# Patient Record
Sex: FEMALE | Race: WHITE | Employment: OTHER | URBAN - METROPOLITAN AREA
[De-identification: names, ages, dates, MRNs, and addresses within clinical notes are randomized per-mention and may not be internally consistent; named-entity substitution may affect disease eponyms.]

---

## 2017-01-10 RX ORDER — METOPROLOL TARTRATE 100 MG/1
TABLET ORAL
Qty: 180 TAB | Refills: 3 | Status: SHIPPED | OUTPATIENT
Start: 2017-01-10 | End: 2018-02-08 | Stop reason: SDUPTHER

## 2017-02-21 ENCOUNTER — OFFICE VISIT (OUTPATIENT)
Dept: CARDIOLOGY CLINIC | Age: 72
End: 2017-02-21

## 2017-02-21 VITALS
OXYGEN SATURATION: 96 % | DIASTOLIC BLOOD PRESSURE: 70 MMHG | SYSTOLIC BLOOD PRESSURE: 110 MMHG | HEART RATE: 60 BPM | HEIGHT: 65 IN | WEIGHT: 215 LBS | BODY MASS INDEX: 35.82 KG/M2

## 2017-02-21 DIAGNOSIS — I42.9 CARDIOMYOPATHY, IDIOPATHIC (HCC): Primary | ICD-10-CM

## 2017-02-21 DIAGNOSIS — Z95.810 BIVENTRICULAR IMPLANTABLE CARDIOVERTER-DEFIBRILLATOR IN SITU: ICD-10-CM

## 2017-02-21 DIAGNOSIS — I44.7 LEFT BUNDLE BRANCH BLOCK: ICD-10-CM

## 2017-02-21 DIAGNOSIS — I48.0 PAF (PAROXYSMAL ATRIAL FIBRILLATION) (HCC): ICD-10-CM

## 2017-02-21 RX ORDER — ESCITALOPRAM OXALATE 20 MG/1
20 TABLET ORAL DAILY
COMMUNITY
Start: 2017-01-25

## 2017-02-21 RX ORDER — METFORMIN HYDROCHLORIDE 500 MG/1
500 TABLET, EXTENDED RELEASE ORAL 2 TIMES DAILY
COMMUNITY
Start: 2017-01-23 | End: 2018-07-31

## 2017-02-21 NOTE — PROGRESS NOTES
1. Have you been to the ER, urgent care clinic since your last visit? Hospitalized since your last visit? no  2. Have you seen or consulted any other health care providers outside of the [address] [place] [address] since your last visit? Include any pap smears or colon screening.  no

## 2017-02-21 NOTE — PROGRESS NOTES
HISTORY OF PRESENT ILLNESS  Bharati Jasmine is a 66 y.o. female. HPI  She has been doing well. She offers no cardiac complaints. She denies chest pain, dyspnea, orthopnea or PND. She denies any dyspnea on exertion at this time. She had an occasion where she walked for a long distance with no shortness of breath whatsoever. She denies palpitations, dizziness or syncope. She has had no symptoms to indicate TIA or amaurosis fugax. Her ICD has reached the end of battery life and she is in need of a generator change in a few months. Her echocardiogram on 07/07/2016 demonstrated normal left ventricular size and lower limit of normal left ventricular systolic function with EF in the 50% range. There was no significant valvular pathology. Left atrial dimension was normal with a volume index of 25 mL/meter2. There was no significant pulmonary hypertension. She has history of a left bundle branch block, hypertension, dyslipidemia and glucose intolerance. She has a 20 pack a year cigarette smoking history until 1988. She was told that she had a heart murmur and hole in her heart as a child, but this has not been clearly documented by echocardiogram or other diagnostic workup. She was seen by Dr. Tsering Thrasher in 2003, for the evaluation of left bundle branch block and had negative stress nuclear cardiac imaging. She was told that everything was okay by Dr. Tsering Thrasher at that time. She had repeat stress nuclear cardiac imaging on September 17, 2007, which demonstrated a moderate, fixed perfusion defect in the basal inferior and mid inferior wall, involving the inferior apex as well, with no significant ischemia. There was also moderately severe scarring in the anterior wall, anterior apex and anterior septum, with very mild associated ischemia.  There was an akinetic interventricular septum and anterior apex, and mild hypokinesis of the proximal anterior wall, with moderate hypokinesis of the entire inferior wall, with overall EF in the 25% range. She subsequently underwent cardiac catheterization on February 27, 2008 which demonstrated patent coronary arteries, but severe LV dysfunction with EF in the 25-30% range. She then went on to have biventricular ICD implantation on May 12, 2008. Her EF has improved to 56% by repeat echocardiogram in 2009. She had a repeat echocardiogram on August 13, 2010, which demonstrated once again improved LV function, with EF in the 55% range. Left atrial volume was normal at 23 ml/m². PA pressure was estimated normal as well.    Because of shortness of breath she had repeat echocardiogram on 4/16/2012 with demonstrated normal LV function with the EF in the 60 to 65% range. There was grade 1 diastolic dysfunction. PA pressure was estimated in the range of 25 to 30 mmHg. There was no significant valvular pathology.    She had an ultrasound examination of the aorta, which demonstrated possible aneurysm; however, the CT scan demonstrated no evidence of aneurysm whatsoever. She does not feel the palpitations much herself; however, her ICD interrogation demonstrated frequent episodes of atrial fibrillation, at times, very prolonged for hours. She had problems with worsening shortness of breath and she developed a cough and fever, and was seen in the emergency room on 05/30/16. She was ultimately diagnosed of DVT and pulmonary emboli by CT angiogram and has been placed on Eliquis. Review of Systems   Constitutional: Negative for malaise/fatigue and weight loss. HENT: Negative for hearing loss. Eyes: Negative for blurred vision and double vision. Respiratory: Negative for shortness of breath. Cardiovascular: Negative for chest pain, palpitations, orthopnea, claudication, leg swelling and PND. Gastrointestinal: Negative for blood in stool, heartburn and melena. Genitourinary: Negative for dysuria, frequency, hematuria and urgency.    Musculoskeletal: Negative for back pain and joint pain. Skin: Negative for itching and rash. Neurological: Negative for dizziness, loss of consciousness, weakness and headaches. Psychiatric/Behavioral: Negative for depression and memory loss. Physical Exam   Constitutional: She is oriented to person, place, and time. She appears well-developed and well-nourished. HENT:   Head: Normocephalic and atraumatic. Eyes: Conjunctivae are normal. Pupils are equal, round, and reactive to light. Neck: Normal range of motion. Neck supple. No JVD present. Cardiovascular: Normal rate, regular rhythm, S1 normal and S2 normal.   No extrasystoles are present. PMI is not displaced. Exam reveals no gallop and no friction rub. Murmur heard. Harsh early systolic murmur is present with a grade of 1/6  at the upper right sternal border  Pulses:       Carotid pulses are 3+ on the right side, and 3+ on the left side. Pulmonary/Chest: Effort normal. She has no rales. Abdominal: Soft. There is no tenderness. Musculoskeletal: She exhibits no edema. Neurological: She is alert and oriented to person, place, and time. No cranial nerve deficit. Skin: Skin is warm and dry. Psychiatric: She has a normal mood and affect. Her behavior is normal.     Visit Vitals    /70    Pulse 60    Ht 5' 5\" (1.651 m)    Wt 97.5 kg (215 lb)    SpO2 96%    BMI 35.78 kg/m2       Past Medical History   Diagnosis Date    CAD (coronary artery disease)      cardiomyopathy,CHF,,Cardiac Cath, pacemaker/defib.  Cancer [facility]) 2013     breast    Cardiac catheterization 02/27/2008     Patent coronary arteries. LVEDP 13 mmHg. EF 25-30%. Global hypk.  Cardiac echocardiogram 07/07/2016     EF 50% (prev 60-65% on study of 4/16/12). No WMA. Gr 1 DDfx. Normal RVSP.  Cardiac nuclear imaging test 05/31/2013     No evidence of ongoing ischemia or prior infarction. EF 60%. No RWMA.   Mild dyssynchrony of inferior base & mid/distal septum likely c/w pacemaker. Nondiagnostic EKG on pharm stress test due to V-pacing.  Cardiovascular abdominal aorta duplex 09/23/2015     Fusiform AAA in prox & mid portions of abdom Ao.  COPD (chronic obstructive pulmonary disease) (HCC) chronic bronchitis    Coronary artery disease Feb. 2008     Possible CAD with nonischemic dilated cardiomyopathy/EF 25%/ improved EF up to 56% by echocardiogram.    GERD (gastroesophageal reflux disease)     Heart failure (HCC)     Heart murmur     Hemangioma of liver     Hx of cardiomyopathy ([address]     Hypercholesterolemia     Hypertension     Left bundle branch block     Phlebitis     PVD (peripheral vascular disease) ([address]     Renal calculi 1999    Thromboembolus ([address]      below knee in R leg       Social History     Social History    Marital status:      Spouse name: N/A    Number of children: N/A    Years of education: N/A     Occupational History    Not on file. Social History Main Topics    Smoking status: Former Smoker     Packs/day: 1.50     Years: 20.00     Quit date: 2/23/1988    Smokeless tobacco: Never Used    Alcohol use No    Drug use: No    Sexual activity: Not on file     Other Topics Concern    Not on file     Social History Narrative       Family History   Problem Relation Age of Onset    Cancer Mother      Cancer of the breast    Stroke Mother     Cancer Father     Heart Disease Father      CHF    COPD Brother     Heart Attack Brother      Multiple    Heart Disease Brother      CHF       Past Surgical History   Procedure Laterality Date    Hx heart catheterization  02/27/08     Patent coronary arteries, but severe LV dysfunction w/ EF in the 25-30% range.     Hx breast lumpectomy Left 8/2013     cancerous lesion    Hx pelvic laparoscopy       diagnostic - varicosities    Hx knee arthroscopy Right     Hx pacemaker  May 2008     Biventricular ICD implantation     Hx pacemaker  2012     replacement - Medtronic    Hx cataract removal Bilateral 1980s     w/ lens implants    Hx cataract removal Left      re vised due to implant repositioning    Hx mastectomy Left 9/24/2014     LEFT PARTIAL MASTECTOMY WITH NEEDLE LOCALIZATION MAMMOGRAM TIMES TWO performed by Sekou Magaña MD at [facility] MAIN OR       Current Outpatient Prescriptions   Medication Sig Dispense Refill    escitalopram oxalate (LEXAPRO) 20 mg tablet Take 20 mg by mouth daily.  metFORMIN ER (GLUCOPHAGE XR) 500 mg tablet Take 500 mg by mouth daily (with dinner).  metoprolol tartrate (LOPRESSOR) 100 mg IR tablet TAKE ONE TABLET BY MOUTH TWICE DAILY 180 Tab 3    ELIQUIS 5 mg tablet two (2) times a day.  triamcinolone acetonide (KENALOG) 0.1 % topical cream       exemestane (AROMASIN) 25 mg tablet Take 25 mg by mouth daily.  pravastatin (PRAVACHOL) 80 mg tablet Take 80 mg by mouth nightly.  coenzyme q10-vitamin e (COQ10 ) 100-100 mg-unit cap Take 1 Tab by mouth daily. EKG: unchanged from previous tracings, 1:1 Biv.pacing  . ASSESSMENT and PLAN  Encounter Diagnoses   Name Primary?  Cardiomyopathy, idiopathic, EF now up to 55% Yes    PAF (paroxysmal atrial fibrillation) (HCC)     Left bundle branch block     Biventricular implantable cardioverter-defibrillator in situ    She has been doing well. She has had no signs or symptoms of decompensated congestive heart failure and her LV function has been maintained quite well with EF in the 50-55% range. She has had paroxysmal atrial fibrillation mostly in sinus rhythm, but because of her history of DVT and pulmonary emboli, she has been anticoagulated with Eliquis. That will be continued indefinitely particularly because of the paroxysmal atrial fibrillation. Her ICD has reached the end of battery life of the generator and she will be scheduled for a generator change.

## 2017-02-21 NOTE — PATIENT INSTRUCTIONS
DR. BRENNAN'S \[facility] [place] [facility]\""          Patient  EP Instructions                  1. You are scheduled to have a BIV AICD Gen change on  March 13 ,2017 , at 0915 am.    Please check in at March 13 , 2017.    2. Please go to [facility] and leisa in the outpatient parking lot that is located around to the back of the hospital and enter through the [employer]. Once you enter through the [facility] check in with the  there. The  will either give you directions or assist you in getting to the cath holding area. 3.  [x]       You are not to eat or drink anything after midnight the morning of the               procedure. 4. Please continue to take your medications with a small sip of water on the morning of the procedure with the following exceptions:  Hold Eliquis (March 11,2017) ( 48 hours prior to procedure)      5. If you are diabetic, do not take your insulin/sugar pill the morning of the procedure. 6. We encourage families to wait in the waiting room on the first floor while the procedure is being done. The Doctor will come out and talk with you as soon as the procedure is over. 7. There is the possibility that you may spend the night in the hospital, depending on the results of the procedure. This will be determined after the procedure is done. 8.   If you or your family have any questions, please call our office Monday-Friday 9:00am         -4:30 pm , at [phone], and ask to speak to one of the nurses.

## 2017-03-06 ENCOUNTER — HOSPITAL ENCOUNTER (OUTPATIENT)
Dept: LAB | Age: 72
Discharge: HOME | End: 2017-03-06
Payer: MEDICARE

## 2017-03-06 LAB
ALBUMIN SERPL BCP-MCNC: 4.2 G/DL (ref 3.4–5)
ALBUMIN/GLOB SERPL: 1.4 {RATIO} (ref 0.8–1.7)
ALP SERPL-CCNC: 128 U/L (ref 45–117)
ALT SERPL-CCNC: 20 U/L (ref 13–56)
ANION GAP BLD CALC-SCNC: 7 MMOL/L (ref 3–18)
AST SERPL W P-5'-P-CCNC: 16 U/L (ref 15–37)
BASOPHILS # BLD AUTO: 0.1 K/UL (ref 0–0.06)
BASOPHILS # BLD: 1 % (ref 0–2)
BILIRUB SERPL-MCNC: 0.9 MG/DL (ref 0.2–1)
BUN SERPL-MCNC: 12 MG/DL (ref 7–18)
BUN/CREAT SERPL: 10 (ref 12–20)
CALCIUM SERPL-MCNC: 10 MG/DL (ref 8.5–10.1)
CHLORIDE SERPL-SCNC: 104 MMOL/L (ref 100–108)
CO2 SERPL-SCNC: 30 MMOL/L (ref 21–32)
CREAT SERPL-MCNC: 1.22 MG/DL (ref 0.6–1.3)
DIFFERENTIAL METHOD BLD: ABNORMAL
EOSINOPHIL # BLD: 0.2 K/UL (ref 0–0.4)
EOSINOPHIL NFR BLD: 2 % (ref 0–5)
ERYTHROCYTE [DISTWIDTH] IN BLOOD BY AUTOMATED COUNT: 14.4 % (ref 11.6–14.5)
GLOBULIN SER CALC-MCNC: 3.1 G/DL (ref 2–4)
GLUCOSE SERPL-MCNC: 97 MG/DL (ref 74–99)
HCT VFR BLD AUTO: 46.7 % (ref 35–45)
HGB BLD-MCNC: 15.2 G/DL (ref 12–16)
INR PPP: 1.1 (ref 0.8–1.2)
LYMPHOCYTES # BLD AUTO: 28 % (ref 21–52)
LYMPHOCYTES # BLD: 2.2 K/UL (ref 0.9–3.6)
MCH RBC QN AUTO: 29.5 PG (ref 24–34)
MCHC RBC AUTO-ENTMCNC: 32.5 G/DL (ref 31–37)
MCV RBC AUTO: 90.7 FL (ref 74–97)
MONOCYTES # BLD: 0.7 K/UL (ref 0.05–1.2)
MONOCYTES NFR BLD AUTO: 8 % (ref 3–10)
NEUTS SEG # BLD: 4.9 K/UL (ref 1.8–8)
NEUTS SEG NFR BLD AUTO: 61 % (ref 40–73)
PLATELET # BLD AUTO: 263 K/UL (ref 135–420)
PMV BLD AUTO: 10.8 FL (ref 9.2–11.8)
POTASSIUM SERPL-SCNC: 4.6 MMOL/L (ref 3.5–5.5)
PROT SERPL-MCNC: 7.3 G/DL (ref 6.4–8.2)
PROTHROMBIN TIME: 13.8 SEC (ref 11.5–15.2)
RBC # BLD AUTO: 5.15 M/UL (ref 4.2–5.3)
SODIUM SERPL-SCNC: 141 MMOL/L (ref 136–145)
WBC # BLD AUTO: 8 K/UL (ref 4.6–13.2)

## 2017-03-06 PROCEDURE — 85610 PROTHROMBIN TIME: CPT | Performed by: INTERNAL MEDICINE

## 2017-03-06 PROCEDURE — 36415 COLL VENOUS BLD VENIPUNCTURE: CPT | Performed by: INTERNAL MEDICINE

## 2017-03-06 PROCEDURE — 80053 COMPREHEN METABOLIC PANEL: CPT | Performed by: INTERNAL MEDICINE

## 2017-03-06 PROCEDURE — 85025 COMPLETE CBC W/AUTO DIFF WBC: CPT | Performed by: INTERNAL MEDICINE

## 2017-03-08 NOTE — H&P
Please see full H&P. Plan biventricular aicd generator changeout, at [facility]. Eliquis held since Friday. Recent scope reviewed with patient. I confirmed patient ok with Medtronic device today. All risks, benefits, alternatives discussed. Plan moderate sedation if anesthesia not available. Risks included but not limited to pain, infection, bleeding, deep venous thrombosis with chronic swelling of arm, anesthesia reactions, pneumothorax, hemothorax, emergent open heart surgery, and death. All questions answered. Diagnosis:  Primary cardiologist Dr. Melani Mart at [facility]. Placed 2008, changeout 2012  -h/o transient NICM with EF 25-30% in 2008, 50% by echo July 2016. Cath 2008 without critical disease.  -Chronic class II diastolic heart failure, compensated  -h/o paroxysmal atrial fibrillation on Eliquis  -h/o DVT May 2016 on Eliquis  -Diabetes  -Recent melena, s/p EGD/colonscopy 3/7/17  -Life expectancy > 1 year  -Baseline LBBB with QRS > 120 msec  -Chronic BB therapy  -Unable to tolerate ARB/ACEI due to hypotension    HISTORY OF PRESENT ILLNESS  Roberto Allred is a 66 y.o. female.     HPI  She has been doing well. She offers no cardiac complaints. She denies chest pain, dyspnea, orthopnea or PND. She denies any dyspnea on exertion at this time. She had an occasion where she walked for a long distance with no shortness of breath whatsoever. She denies palpitations, dizziness or syncope. She has had no symptoms to indicate TIA or amaurosis fugax. Her ICD has reached the end of battery life and she is in need of a generator change in a few months. Her echocardiogram on 07/07/2016 demonstrated normal left ventricular size and lower limit of normal left ventricular systolic function with EF in the 50% range. There was no significant valvular pathology. Left atrial dimension was normal with a volume index of 25 mL/meter2.  There was no significant pulmonary hypertension.       She has history of a left bundle branch block, hypertension, dyslipidemia and glucose intolerance. She has a 20 pack a year cigarette smoking history until 1988. She was told that she had a heart murmur and hole in her heart as a child, but this has not been clearly documented by echocardiogram or other diagnostic workup. She was seen by Dr. Felicity Delgado in 2003, for the evaluation of left bundle branch block and had negative stress nuclear cardiac imaging. She was told that everything was okay by Dr. Felicity Delgado at that time. She had repeat stress nuclear cardiac imaging on September 17, 2007, which demonstrated a moderate, fixed perfusion defect in the basal inferior and mid inferior wall, involving the inferior apex as well, with no significant ischemia. There was also moderately severe scarring in the anterior wall, anterior apex and anterior septum, with very mild associated ischemia. There was an akinetic interventricular septum and anterior apex, and mild hypokinesis of the proximal anterior wall, with moderate hypokinesis of the entire inferior wall, with overall EF in the 25% range. She subsequently underwent cardiac catheterization on February 27, 2008 which demonstrated patent coronary arteries, but severe LV dysfunction with EF in the 25-30% range. She then went on to have biventricular ICD implantation on May 12, 2008. Her EF has improved to 56% by repeat echocardiogram in 2009. She had a repeat echocardiogram on August 13, 2010, which demonstrated once again improved LV function, with EF in the 55% range. Left atrial volume was normal at 23 ml/m². PA pressure was estimated normal as well.    Because of shortness of breath she had repeat echocardiogram on 4/16/2012 with demonstrated normal LV function with the EF in the 60 to 65% range. There was grade 1 diastolic dysfunction. PA pressure was estimated in the range of 25 to 30 mmHg.  There was no significant valvular pathology.    She had an ultrasound examination of the aorta, which demonstrated possible aneurysm; however, the CT scan demonstrated no evidence of aneurysm whatsoever. She does not feel the palpitations much herself; however, her ICD interrogation demonstrated frequent episodes of atrial fibrillation, at times, very prolonged for hours. She had problems with worsening shortness of breath and she developed a cough and fever, and was seen in the emergency room on 05/30/16. She was ultimately diagnosed of DVT and pulmonary emboli by CT angiogram and has been placed on Eliquis.     Review of Systems   Constitutional: Negative for malaise/fatigue and weight loss. HENT: Negative for hearing loss. Eyes: Negative for blurred vision and double vision. Respiratory: Negative for shortness of breath. Cardiovascular: Negative for chest pain, palpitations, orthopnea, claudication, leg swelling and PND. Gastrointestinal: Negative for blood in stool, heartburn and melena. Genitourinary: Negative for dysuria, frequency, hematuria and urgency. Musculoskeletal: Negative for back pain and joint pain. Skin: Negative for itching and rash. Neurological: Negative for dizziness, loss of consciousness, weakness and headaches. Psychiatric/Behavioral: Negative for depression and memory loss.         Physical Exam   Constitutional: She is oriented to person, place, and time. She appears well-developed and well-nourished. HENT:   Head: Normocephalic and atraumatic. Eyes: Conjunctivae are normal. Pupils are equal, round, and reactive to light. Neck: Normal range of motion. Neck supple. No JVD present. Cardiovascular: Normal rate, regular rhythm, S1 normal and S2 normal. No extrasystoles are present. PMI is not displaced. Exam reveals no gallop and no friction rub. Murmur heard. Harsh early systolic murmur is present with a grade of 1/6 at the upper right sternal border  Pulses:  Carotid pulses are 3+ on the right side, and 3+ on the left side.   Pulmonary/Chest: Effort normal. She has no rales. Abdominal: Soft. There is no tenderness. Musculoskeletal: She exhibits no edema. Neurological: She is alert and oriented to person, place, and time. No cranial nerve deficit. Skin: Skin is warm and dry. Psychiatric: She has a normal mood and affect. Her behavior is normal.           Visit Vitals    /70    Pulse 60    Ht 5' 5\" (1.651 m)    Wt 97.5 kg (215 lb)    SpO2 96%    BMI 35.78 kg/m2               Past Medical History   Diagnosis Date    CAD (coronary artery disease)         cardiomyopathy,CHF,,Cardiac Cath, pacemaker/defib.  Cancer [facility]) 2013       breast    Cardiac catheterization 02/27/2008       Patent coronary arteries. LVEDP 13 mmHg. EF 25-30%. Global hypk.  Cardiac echocardiogram 07/07/2016       EF 50% (prev 60-65% on study of 4/16/12). No WMA. Gr 1 DDfx. Normal RVSP.  Cardiac nuclear imaging test 05/31/2013       No evidence of ongoing ischemia or prior infarction. EF 60%. No RWMA. Mild dyssynchrony of inferior base & mid/distal septum likely c/w pacemaker. Nondiagnostic EKG on pharm stress test due to V-pacing.  Cardiovascular abdominal aorta duplex 09/23/2015       Fusiform AAA in prox & mid portions of abdom Ao.     [facility]ch[facility] obstructive pulmonary disease) ([facility]) chronic bronchitis    Coronary artery disease Feb. 2008       Possible CAD with nonischemic dilated cardiomyopathy/EF 25%/ improved EF up to 56% by echocardiogram.    GERD (gastroesophageal reflux disease)      Heart failure (HCC)      Heart murmur      Hemangioma of liver      Hx of cardiomyopathy (HCC)      Hypercholesterolemia      Hypertension      Left bundle branch block      Phlebitis      PVD (peripheral vascular disease) (HCC)      Renal calculi 1999    Thrombo[address]olus [address]         below knee in R leg         Social History            Social History    Marital status:        Spouse name: N/A    Number of children: N/A    Years of education: N/A     Occupational History    Not on file.            Social History Main Topics    Smoking status: Former Smoker       Packs/day: 1.50       Years: 20.00       Quit date: 2/23/1988    Smokeless tobacco: Never Used    Alcohol use No    Drug use: No    Sexual activity: Not on file           Other Topics Concern    Not on file      Social History Narrative                Family History   Problem Relation Age of Onset    Cancer Mother         Cancer of the breast    Stroke Mother      Cancer Father      Heart Disease Father         CHF    COPD Brother      Heart Attack Brother         Multiple    Heart Disease Brother         CHF                Past Surgical History   Procedure Laterality Date    Hx heart catheterization   02/27/08       Patent coronary arteries, but severe LV dysfunction w/ EF in the 25-30% range.  Hx breast lumpectomy Left 8/2013       cancerous lesion    Hx pelvic laparoscopy           diagnostic - varicosities    Hx knee arthroscopy Right      Hx pacemaker   May 2008       Biventricular ICD implantation     Hx pacemaker   2012       replacement - Medtronic    Hx cataract removal Bilateral 1980s       w/ lens implants    Hx cataract removal Left         re vised due to implant repositioning    Hx mastectomy Left 9/24/2014       LEFT PARTIAL MASTECTOMY WITH NEEDLE LOCALIZATION MAMMOGRAM TIMES TWO performed by Pam Wagoner MD at [address] [facility]                Current Outpatient Prescriptions   Medication Sig Dispense Refill    escitalopram oxalate (LEXAPRO) 20 mg tablet Take 20 mg by mouth daily.        metFORMIN ER (GLUCOPHAGE XR) 500 mg tablet Take 500 mg by mouth daily (with dinner).         metoprolol tartrate (LOPRESSOR) 100 mg IR tablet TAKE ONE TABLET BY MOUTH TWICE DAILY 180 Tab 3    ELIQUIS 5 mg tablet two (2) times a day.        triamcinolone acetonide (KENALOG) 0.1 % topical cream          exemestane (AROMASIN) 25 mg tablet Take 25 mg by mouth daily.        pravastatin (PRAVACHOL) 80 mg tablet Take 80 mg by mouth nightly.        coenzyme q10-vitamin e (COQ10 ) 100-100 mg-unit cap Take 1 Tab by mouth daily.             EKG: unchanged from previous tracings, 1:1 Biv.pacing  . ASSESSMENT and PLAN       Encounter Diagnoses   Name Primary?  Cardiomyopathy, idiopathic, EF now up to 55% Yes    PAF (paroxysmal atrial fibrillation) (HCC)      Left bundle branch block      Biventricular implantable cardioverter-defibrillator in situ       She has been doing well. She has had no signs or symptoms of decompensated congestive heart failure and her LV function has been maintained quite well with EF in the 50-55% range. She has had paroxysmal atrial fibrillation mostly in sinus rhythm, but because of her history of DVT and pulmonary emboli, she has been anticoagulated with Eliquis. That will be continued indefinitely particularly because of the paroxysmal atrial fibrillation.  Her ICD has reached the end of battery life of the generator and she will be scheduled for a generator change.

## 2017-03-11 ENCOUNTER — ANESTHESIA EVENT (OUTPATIENT)
Dept: CARDIAC CATH/INVASIVE PROCEDURES | Age: 72
End: 2017-03-11
Payer: MEDICARE

## 2017-03-13 ENCOUNTER — ANESTHESIA (OUTPATIENT)
Dept: CARDIAC CATH/INVASIVE PROCEDURES | Age: 72
End: 2017-03-13
Payer: MEDICARE

## 2017-03-13 ENCOUNTER — HOSPITAL ENCOUNTER (OUTPATIENT)
Dept: CARDIAC CATH/INVASIVE PROCEDURES | Age: 72
Discharge: HOME | End: 2017-03-13
Attending: INTERNAL MEDICINE | Admitting: INTERNAL MEDICINE
Payer: MEDICARE

## 2017-03-13 VITALS
WEIGHT: 215 LBS | RESPIRATION RATE: 23 BRPM | BODY MASS INDEX: 35.82 KG/M2 | SYSTOLIC BLOOD PRESSURE: 111 MMHG | TEMPERATURE: 97.9 F | HEIGHT: 65 IN | OXYGEN SATURATION: 98 % | DIASTOLIC BLOOD PRESSURE: 70 MMHG | HEART RATE: 58 BPM

## 2017-03-13 DIAGNOSIS — R59.0 MEDIASTINAL ADENOPATHY: ICD-10-CM

## 2017-03-13 DIAGNOSIS — I26.99 OTHER ACUTE PULMONARY EMBOLISM WITHOUT ACUTE COR PULMONALE (HCC): ICD-10-CM

## 2017-03-13 DIAGNOSIS — J44.9 CHRONIC OBSTRUCTIVE PULMONARY DISEASE, UNSPECIFIED COPD TYPE (HCC): ICD-10-CM

## 2017-03-13 PROBLEM — Z45.02 AICD AT END OF BATTERY LIFE: Status: ACTIVE | Noted: 2017-03-13

## 2017-03-13 PROCEDURE — 74011250636 HC RX REV CODE- 250/636: Performed by: INTERNAL MEDICINE

## 2017-03-13 PROCEDURE — 77030018673 EP STUDY

## 2017-03-13 PROCEDURE — 74011000250 HC RX REV CODE- 250: Performed by: INTERNAL MEDICINE

## 2017-03-13 PROCEDURE — 74011250636 HC RX REV CODE- 250/636

## 2017-03-13 PROCEDURE — 74011000250 HC RX REV CODE- 250

## 2017-03-13 PROCEDURE — 76060000033 HC ANESTHESIA 1 TO 1.5 HR

## 2017-03-13 RX ORDER — PROPOFOL 10 MG/ML
INJECTION, EMULSION INTRAVENOUS
Status: DISCONTINUED | OUTPATIENT
Start: 2017-03-13 | End: 2017-03-13 | Stop reason: HOSPADM

## 2017-03-13 RX ORDER — SODIUM CHLORIDE 0.9 % (FLUSH) 0.9 %
5-10 SYRINGE (ML) INJECTION AS NEEDED
Status: CANCELLED | OUTPATIENT
Start: 2017-03-13

## 2017-03-13 RX ORDER — MAGNESIUM SULFATE 100 %
4 CRYSTALS MISCELLANEOUS AS NEEDED
Status: CANCELLED | OUTPATIENT
Start: 2017-03-13

## 2017-03-13 RX ORDER — DEXTROSE 50 % IN WATER (D50W) INTRAVENOUS SYRINGE
25-50 AS NEEDED
Status: DISCONTINUED | OUTPATIENT
Start: 2017-03-13 | End: 2017-03-13 | Stop reason: HOSPADM

## 2017-03-13 RX ORDER — FLUMAZENIL 0.1 MG/ML
0.2 INJECTION INTRAVENOUS
Status: CANCELLED | OUTPATIENT
Start: 2017-03-13

## 2017-03-13 RX ORDER — MIDAZOLAM HYDROCHLORIDE 1 MG/ML
INJECTION, SOLUTION INTRAMUSCULAR; INTRAVENOUS AS NEEDED
Status: DISCONTINUED | OUTPATIENT
Start: 2017-03-13 | End: 2017-03-13 | Stop reason: HOSPADM

## 2017-03-13 RX ORDER — DEXTROSE 50 % IN WATER (D50W) INTRAVENOUS SYRINGE
25-50 AS NEEDED
Status: CANCELLED | OUTPATIENT
Start: 2017-03-13

## 2017-03-13 RX ORDER — CEFAZOLIN SODIUM 2 G/50ML
2 SOLUTION INTRAVENOUS ONCE
Status: COMPLETED | OUTPATIENT
Start: 2017-03-13 | End: 2017-03-13

## 2017-03-13 RX ORDER — FAMOTIDINE 20 MG/1
20 TABLET, FILM COATED ORAL ONCE
Status: DISCONTINUED | OUTPATIENT
Start: 2017-03-13 | End: 2017-03-13 | Stop reason: HOSPADM

## 2017-03-13 RX ORDER — MAGNESIUM SULFATE 100 %
4 CRYSTALS MISCELLANEOUS AS NEEDED
Status: DISCONTINUED | OUTPATIENT
Start: 2017-03-13 | End: 2017-03-13 | Stop reason: HOSPADM

## 2017-03-13 RX ORDER — LIDOCAINE HYDROCHLORIDE 20 MG/ML
INJECTION, SOLUTION EPIDURAL; INFILTRATION; INTRACAUDAL; PERINEURAL AS NEEDED
Status: DISCONTINUED | OUTPATIENT
Start: 2017-03-13 | End: 2017-03-13 | Stop reason: HOSPADM

## 2017-03-13 RX ORDER — IODIXANOL 320 MG/ML
1-150 INJECTION, SOLUTION INTRAVASCULAR
Status: DISCONTINUED | OUTPATIENT
Start: 2017-03-13 | End: 2017-03-13 | Stop reason: HOSPADM

## 2017-03-13 RX ORDER — APIXABAN 5 MG/1
5 TABLET, FILM COATED ORAL 2 TIMES DAILY
Qty: 120 TAB | Refills: 3 | Status: SHIPPED | OUTPATIENT
Start: 2017-03-13 | End: 2021-03-24 | Stop reason: SDUPTHER

## 2017-03-13 RX ORDER — LIDOCAINE HYDROCHLORIDE 10 MG/ML
1-60 INJECTION, SOLUTION EPIDURAL; INFILTRATION; INTRACAUDAL; PERINEURAL
Status: DISCONTINUED | OUTPATIENT
Start: 2017-03-13 | End: 2017-03-13 | Stop reason: HOSPADM

## 2017-03-13 RX ORDER — INSULIN LISPRO 100 [IU]/ML
INJECTION, SOLUTION INTRAVENOUS; SUBCUTANEOUS ONCE
Status: CANCELLED | OUTPATIENT
Start: 2017-03-13 | End: 2017-03-13

## 2017-03-13 RX ORDER — OXYCODONE AND ACETAMINOPHEN 5; 325 MG/1; MG/1
1 TABLET ORAL
Qty: 20 TAB | Refills: 0 | Status: SHIPPED | OUTPATIENT
Start: 2017-03-13 | End: 2017-08-21

## 2017-03-13 RX ORDER — HEPARIN SODIUM 200 [USP'U]/100ML
500 INJECTION, SOLUTION INTRAVENOUS ONCE
Status: DISCONTINUED | OUTPATIENT
Start: 2017-03-13 | End: 2017-03-13 | Stop reason: HOSPADM

## 2017-03-13 RX ORDER — PROPOFOL 10 MG/ML
INJECTION, EMULSION INTRAVENOUS AS NEEDED
Status: DISCONTINUED | OUTPATIENT
Start: 2017-03-13 | End: 2017-03-13 | Stop reason: HOSPADM

## 2017-03-13 RX ORDER — SODIUM CHLORIDE, SODIUM LACTATE, POTASSIUM CHLORIDE, CALCIUM CHLORIDE 600; 310; 30; 20 MG/100ML; MG/100ML; MG/100ML; MG/100ML
100 INJECTION, SOLUTION INTRAVENOUS CONTINUOUS
Status: CANCELLED | OUTPATIENT
Start: 2017-03-13

## 2017-03-13 RX ORDER — NALOXONE HYDROCHLORIDE 0.4 MG/ML
0.1 INJECTION, SOLUTION INTRAMUSCULAR; INTRAVENOUS; SUBCUTANEOUS
Status: CANCELLED | OUTPATIENT
Start: 2017-03-13

## 2017-03-13 RX ORDER — SODIUM CHLORIDE 9 MG/ML
INJECTION, SOLUTION INTRAVENOUS
Status: DISCONTINUED | OUTPATIENT
Start: 2017-03-13 | End: 2017-03-13 | Stop reason: HOSPADM

## 2017-03-13 RX ORDER — INSULIN LISPRO 100 [IU]/ML
INJECTION, SOLUTION INTRAVENOUS; SUBCUTANEOUS ONCE
Status: DISCONTINUED | OUTPATIENT
Start: 2017-03-13 | End: 2017-03-13 | Stop reason: HOSPADM

## 2017-03-13 RX ORDER — SODIUM CHLORIDE, SODIUM LACTATE, POTASSIUM CHLORIDE, CALCIUM CHLORIDE 600; 310; 30; 20 MG/100ML; MG/100ML; MG/100ML; MG/100ML
25 INJECTION, SOLUTION INTRAVENOUS CONTINUOUS
Status: DISCONTINUED | OUTPATIENT
Start: 2017-03-13 | End: 2017-03-13 | Stop reason: HOSPADM

## 2017-03-13 RX ORDER — FENTANYL CITRATE 50 UG/ML
INJECTION, SOLUTION INTRAMUSCULAR; INTRAVENOUS AS NEEDED
Status: DISCONTINUED | OUTPATIENT
Start: 2017-03-13 | End: 2017-03-13 | Stop reason: HOSPADM

## 2017-03-13 RX ORDER — CEFAZOLIN SODIUM 1 G/3ML
1 INJECTION, POWDER, FOR SOLUTION INTRAMUSCULAR; INTRAVENOUS ONCE
Status: COMPLETED | OUTPATIENT
Start: 2017-03-13 | End: 2017-03-13

## 2017-03-13 RX ADMIN — FENTANYL CITRATE 25 MCG: 50 INJECTION, SOLUTION INTRAMUSCULAR; INTRAVENOUS at 09:35

## 2017-03-13 RX ADMIN — PROPOFOL 30 MG: 10 INJECTION, EMULSION INTRAVENOUS at 09:43

## 2017-03-13 RX ADMIN — LIDOCAINE HYDROCHLORIDE 20 MG: 20 INJECTION, SOLUTION EPIDURAL; INFILTRATION; INTRACAUDAL; PERINEURAL at 09:24

## 2017-03-13 RX ADMIN — FENTANYL CITRATE 50 MCG: 50 INJECTION, SOLUTION INTRAMUSCULAR; INTRAVENOUS at 09:26

## 2017-03-13 RX ADMIN — PROPOFOL 25 MCG/KG/MIN: 10 INJECTION, EMULSION INTRAVENOUS at 09:26

## 2017-03-13 RX ADMIN — FENTANYL CITRATE 25 MCG: 50 INJECTION, SOLUTION INTRAMUSCULAR; INTRAVENOUS at 09:58

## 2017-03-13 RX ADMIN — SODIUM CHLORIDE: 9 INJECTION, SOLUTION INTRAVENOUS at 09:14

## 2017-03-13 RX ADMIN — CEFAZOLIN SODIUM 2 G: 2 SOLUTION INTRAVENOUS at 09:21

## 2017-03-13 RX ADMIN — LIDOCAINE HYDROCHLORIDE 30 ML: 10 INJECTION, SOLUTION EPIDURAL; INFILTRATION; INTRACAUDAL; PERINEURAL at 09:59

## 2017-03-13 RX ADMIN — CEFAZOLIN 1 G: 330 INJECTION, POWDER, FOR SOLUTION INTRAMUSCULAR; INTRAVENOUS at 09:41

## 2017-03-13 RX ADMIN — MIDAZOLAM HYDROCHLORIDE 1 MG: 1 INJECTION, SOLUTION INTRAMUSCULAR; INTRAVENOUS at 09:35

## 2017-03-13 RX ADMIN — MIDAZOLAM HYDROCHLORIDE 1 MG: 1 INJECTION, SOLUTION INTRAMUSCULAR; INTRAVENOUS at 09:26

## 2017-03-13 NOTE — PROGRESS NOTES
Pt taken to car in front of lobby area via wc by this nurse, tolerated well; pt alert oriented; given medtronic box, card, pamphlet in pt belonging bag; rx x 1 for percocet and dc paperwork

## 2017-03-13 NOTE — ANESTHESIA POSTPROCEDURE EVALUATION
Post-Anesthesia Evaluation and Assessment    Patient: Marylu Monahan MRN: [id number]  SSN: [id number]    YOB: 1945  Age: 66 y.o. Sex: female       Cardiovascular Function/Vital Signs  Visit Vitals    /74 (BP 1 Location: Left arm, BP Patient Position: At rest;Supine)    Pulse (!) 58    Temp 36.6 °C (97.9 °F)    Resp 18    Ht 5' 5\" (1.651 m)    Wt 97.5 kg (215 lb)    SpO2 96%    Breastfeeding No    BMI 35.78 kg/m2       Patient is status post MAC anesthesia for * No procedures listed *. Nausea/Vomiting: None    Postoperative hydration reviewed and adequate. Pain:  Pain Scale 1: Numeric (0 - 10) (03/13/17 1027)  Pain Intensity 1: 0 (03/13/17 1027)   Managed    Neurological Status: At baseline    Mental Status and Level of Consciousness: Arousable    Pulmonary Status:   O2 Device: Room air (03/13/17 1027)   Adequate oxygenation and airway patent    Complications related to anesthesia: None    Post-anesthesia assessment completed.  No concerns    Signed By: Linn Hinojosa MD     March 13, 2017

## 2017-03-13 NOTE — DISCHARGE INSTRUCTIONS
Resume Eliquis in 72 hours from procedure. Disposition:  Will need follow-up with device/wound check in 7-10 days in my office. Please contact office at [phone] to confirm appointment. Main Office:    [address], [address]    Restrictions: For affected arm:  No lifting greater than 10 lbs or lifting elbow above shoulder for 4 weeks. Keep incision clean and dry for a total of 72 hours after procedure. Remove dressing in 24 hours if not already removed. Please remove the steristrips (small white adhesive strips over wound) after 7 days if they have not already fallen off. No hot tubs or pools for 2 weeks. OK to shower with \"pad\" dry incision after 72 hours. No driving ideally for 1 week due to concern for airbag. DISCHARGE SUMMARY from Nurse    The following personal items are in your possession at time of discharge:       Visual Aid: Glasses, At home           Clothing: With patient                PATIENT INSTRUCTIONS:    After general anesthesia or intravenous sedation, for 24 hours or while taking prescription Narcotics:  · Limit your activities  · Do not drive and operate hazardous machinery  · Do not make important personal or business decisions  · Do  not drink alcoholic beverages  · If you have not urinated within 8 hours after discharge, please contact your surgeon on call. Report the following to your surgeon:  · Excessive pain, swelling, redness or odor of or around the surgical area  · Temperature over 100.5  · Nausea and vomiting lasting longer than 4 hours or if unable to take medications  · Any signs of decreased circulation or nerve impairment to extremity: change in color, persistent  numbness, tingling, coldness or increase pain  · Any questions        What to do at Home:    *  Please give a list of your current medications to your Primary Care Provider.     *  Please update this list whenever your medications are discontinued, doses are changed, or new medications (including over-the-counter products) are added. *  Please carry medication information at all times in case of emergency situations. These are general instructions for a healthy lifestyle:    No smoking/ No tobacco products/ Avoid exposure to second hand smoke    Surgeon General's Warning:  Quitting smoking now greatly reduces serious risk to your health. Obesity, smoking, and sedentary lifestyle greatly increases your risk for illness    A healthy diet, regular physical exercise & weight monitoring are important for maintaining a healthy lifestyle    You may be retaining fluid if you have a history of heart failure or if you experience any of the following symptoms:  Weight gain of 3 pounds or more overnight or 5 pounds in a week, increased swelling in our hands or feet or shortness of breath while lying flat in bed. Please call your doctor as soon as you notice any of these symptoms; do not wait until your next office visit. Recognize signs and symptoms of STROKE:    F-face looks uneven    A-arms unable to move or move unevenly    S-speech slurred or non-existent    T-time-call 911 as soon as signs and symptoms begin-DO NOT go       Back to bed or wait to see if you get better-TIME IS BRAIN. Warning Signs of HEART ATTACK     Call 911 if you have these symptoms:   Chest discomfort. Most heart attacks involve discomfort in the center of the chest that lasts more than a few minutes, or that goes away and comes back. It can feel like uncomfortable pressure, squeezing, fullness, or pain.  Discomfort in other areas of the upper body. Symptoms can include pain or discomfort in one or both arms, the back, neck, jaw, or stomach.  Shortness of breath with or without chest discomfort.  Other signs may include breaking out in a cold sweat, nausea, or lightheadedness. Don't wait more than five minutes to call 911 - MINUTES MATTER! Fast action can save your life. Calling 911 is almost always the fastest way to get lifesaving treatment. Emergency Medical Services staff can begin treatment when they arrive -- up to an hour sooner than if someone gets to the hospital by car. The discharge information has been reviewed with the patient and spouse. The patient and spouse verbalized understanding. Discharge medications reviewed with the patient and spouse and appropriate educational materials and side effects teaching were provided. Patient armband removed and shredded    MyChart Activation    Thank you for requesting access to myZamana. Please follow the instructions below to securely access and download your online medical record. myZamana allows you to send messages to your doctor, view your test results, renew your prescriptions, schedule appointments, and more. How Do I Sign Up? 1. In your internet browser, go to [email or web address]. 2. Click on the First Time User? Click Here link in the Sign In box. You will see the New Member Sign Up page. 3. Enter your myZamana Access Code exactly as it appears below. You will not need to use this code after youve completed the sign-up process. If you do not sign up before the expiration date, you must request a new code. myZamana Access Code: [email or web address]  Expires: 2017  3:27 PM (This is the date your myZamana access code will )    4. Enter the last four digits of your Social Security Number (xxxx) and Date of Birth (mm/dd/yyyy) as indicated and click Submit. You will be taken to the next sign-up page. 5. Create a myZamana ID. This will be your myZamana login ID and cannot be changed, so think of one that is secure and easy to remember. 6. Create a myZamana password. You can change your password at any time. 7. Enter your Password Reset Question and Answer. This can be used at a later time if you forget your password. 8. Enter your e-mail address.  You will receive e-mail notification when new information is available in [address] 9. Click Sign Up. You can now view and download portions of your medical record. 10. Click the Download Summary menu link to download a portable copy of your medical information. Additional Information    If you have questions, please visit the Frequently Asked Questions section of the [employer] website at [email or web address] Remember, Ther[employer] is NOT to be used for urgent needs. For medical emergencies, dial 911.

## 2017-03-13 NOTE — PROGRESS NOTES
TRANSFER - IN REPORT:    Verbal report received from Delbra Goldberg, RCIS(name) on Yefri Seat  being received from EP lab(unit) for routine post - op      Report consisted of patients Situation, Background, Assessment and   Recommendations(SBAR). Information from the following report(s) SBAR, Procedure Summary, Intake/Output and MAR was reviewed with the receiving nurse. Opportunity for questions and clarification was provided. Assessment completed upon patients arrival to unit and care assumed.

## 2017-03-13 NOTE — ROUTINE PROCESS
TRANSFER - OUT REPORT:    Verbal report given to Leann Norman RN(name) on Marianela Barney  being transferred to [facility](unit) for routine progression of care       Report consisted of patients Situation, Background, Assessment and   Recommendations(SBAR). Information from the following report(s) SBAR, Procedure Summary and MAR was reviewed with the receiving nurse. Lines:   Peripheral IV 03/07/17 Right Hand (Active)       Peripheral IV 03/13/17 Right Forearm (Active)   Site Assessment Clean, dry, & intact 3/13/2017  9:00 AM   Phlebitis Assessment 0 3/13/2017  9:00 AM   Infiltration Assessment 0 3/13/2017  9:00 AM   Dressing Status New 3/13/2017  9:00 AM   Dressing Type Transparent 3/13/2017  9:00 AM   Hub Color/Line Status Pink;Flushed;Patent 3/13/2017  9:00 AM   Alcohol Cap Used Yes 3/13/2017  9:00 AM        Opportunity for questions and clarification was provided.       Patient transported with:   [employer] Active

## 2017-03-13 NOTE — IP AVS SNAPSHOT
[facility] 
 
 
 [address] Patient: Serjio Bello MRN: [id number] :1945 You are allergic to the following Allergen Reactions Lisinopril Rash Hypertensive crisis Tetracycline Anaphylaxis Rash Swelling Hibiclens (Chlorhexidine Gluconate) Other (comments) Turned red and BP drop low. Other Medication Rash Swelling  
 -Myacins Recent Documentation Height Weight Breastfeeding? BMI OB Status Smoking Status 1.651 m 97.5 kg No 35.78 kg/m2 Postmenopausal Former Smoker Emergency Contacts Name Discharge Info Relation Home Work Mobile Mal Garay  Spouse [3] [phone] Silviano Miller  Son [22] [phone] About your hospitalization You were admitted on:  2017 You last received care in the:  [facility] You were discharged on:  2017 Unit phone number:  [phone] Why you were hospitalized Your primary diagnosis was:  Aicd At End Of Battery Life Providers Seen During Your Hospitalizations Provider Role Specialty Primary office phone Ramona Butt MD Attending Provider Cardiology [phone] Your Primary Care Physician (PCP) Primary Care Physician Office Phone Office Fax [phone],[facility], [address] [facility] [phone] Follow-up Information Follow up With Details Comments Contact Info Meredith Forde MD   [address] [place] [address] [phone] Alondra Shoemaker MD Schedule an appointment as soon as possible for a visit in 4 weeks follow up in 4-6 weeks [address] [place] [address] [address] 207 [facility] 
[phone] Ramona Butt MD Schedule an appointment as soon as possible for a visit in 1 week follow up in 7-10 days 27 [facility] [address] [facility] [address] [address] [address] [phone] Your Appointments Wednesday April 19, 2017 10:30 AM EDT Nurse Visit with [facility] [address] ([address]) 235 [facility], [address] [address]  
[phone] Wednesday April 19, 2017 11:00 AM EDT Nurse Visit with [facility] [address] ([address]) 235 [facility], [address] [address]  
[phone] Wednesday April 19, 2017 11:45 AM EDT Follow Up with Kaila Savage MD  
[address] ([address]) 235 [facility], [address] [address]  
[phone] Current Discharge Medication List  
  
START taking these medications Dose & Instructions Dispensing Information Comments Morning Noon Evening Bedtime  
 oxyCODONE-acetaminophen 5-325 mg per tablet Commonly known as:  PERCOCET Your last dose was: Your next dose is: Other:  _________ Dose:  1 Tab Take 1 Tab by mouth every six (6) hours as needed for Pain. Max Daily Amount: 4 Tabs. Quantity:  20 Tab Refills:  0 CONTINUE these medications which have CHANGED Dose & Instructions Dispensing Information Comments Morning Noon Evening Bedtime ELIQUIS 5 mg tablet Generic drug:  apixaban What changed:   
- how much to take 
- how to take this Your last dose was: Your next dose is: Other:  _________ Dose:  5 mg Take 1 Tab by mouth two (2) times a day. Quantity:  120 Tab Refills:  3 Resume evening of 3/16/17 CONTINUE these medications which have NOT CHANGED Dose & Instructions Dispensing Information Comments Morning Noon Evening Bedtime  
 escitalopram oxalate 20 mg tablet Commonly known as:  Cloria Kohut Your last dose was: Your next dose is: Other:  _________ Dose:  20 mg Take 20 mg by mouth daily. Refills:  0  
     
   
   
   
  
 exemestane 25 mg tablet Commonly known as:  Morene Chafe Your last dose was: Your next dose is: Other:  _________ Dose:  25 mg Take 25 mg by mouth daily. Refills:  0  
     
   
   
   
  
 metFORMIN  mg tablet Commonly known as:  GLUCOPHAGE XR Your last dose was: Your next dose is: Other:  _________ Dose:  500 mg Take 500 mg by mouth daily (with dinner). Refills:  0  
     
   
   
   
  
 metoprolol tartrate 100 mg IR tablet Commonly known as:  LOPRESSOR Your last dose was: Your next dose is: Other:  _________ TAKE ONE TABLET BY MOUTH TWICE DAILY Quantity:  180 Tab Refills:  3 PRAVACHOL 80 mg tablet Generic drug:  pravastatin Your last dose was: Your next dose is: Other:  _________ Dose:  80 mg Take 80 mg by mouth nightly. Refills:  0 Where to Get Your Medications These medications were sent to [facility], [address] [address] Phone:  [phone] ELIQUIS 5 mg tablet Information on where to get these meds will be given to you by the nurse or doctor. ! Ask your nurse or doctor about these medications  
  oxyCODONE-acetaminophen 5-325 mg per tablet Discharge Instructions Resume Eliquis in 72 hours from procedure. Disposition: 
Will need follow-up with device/wound check in 7-10 days in my office. Please contact office at [phone] to confirm appointment. Main Office:   
 [address], [address] [place]  Restrictions: For affected arm:  No lifting greater than 10 lbs or lifting elbow above shoulder for 4 weeks. Keep incision clean and dry for a total of 72 hours after procedure. Remove dressing in 24 hours if not already removed. Please remove the steristrips (small white adhesive strips over wound) after 7 days if they have not already fallen off. No hot tubs or pools for 2 weeks. OK to shower with \"pad\" dry incision after 72 hours. No driving ideally for 1 week due to concern for airbag. DISCHARGE SUMMARY from Nurse The following personal items are in your possession at time of discharge: 
 
  
Visual Aid: Glasses, At home Clothing: With patient PATIENT INSTRUCTIONS: 
 
After general anesthesia or intravenous sedation, for 24 hours or while taking prescription Narcotics: · Limit your activities · Do not drive and operate hazardous machinery · Do not make important personal or business decisions · Do  not drink alcoholic beverages · If you have not urinated within 8 hours after discharge, please contact your surgeon on call. Report the following to your surgeon: 
· Excessive pain, swelling, redness or odor of or around the surgical area · Temperature over 100.5 · Nausea and vomiting lasting longer than 4 hours or if unable to take medications · Any signs of decreased circulation or nerve impairment to extremity: change in color, persistent  numbness, tingling, coldness or increase pain · Any questions What to do at Home: *  Please give a list of your current medications to your Primary Care Provider. *  Please update this list whenever your medications are discontinued, doses are 
    changed, or new medications (including over-the-counter products) are added. *  Please carry medication information at all times in case of emergency situations. These are general instructions for a healthy lifestyle: No smoking/ No tobacco products/ Avoid exposure to second hand smoke Surgeon General's Warning:  Quitting smoking now greatly reduces serious risk to your health. Obesity, smoking, and sedentary lifestyle greatly increases your risk for illness A healthy diet, regular physical exercise & weight monitoring are important for maintaining a healthy lifestyle You may be retaining fluid if you have a history of heart failure or if you experience any of the following symptoms:  Weight gain of 3 pounds or more overnight or 5 pounds in a week, increased swelling in our hands or feet or shortness of breath while lying flat in bed. Please call your doctor as soon as you notice any of these symptoms; do not wait until your next office visit. Recognize signs and symptoms of STROKE: 
 
F-face looks uneven A-arms unable to move or move unevenly S-speech slurred or non-existent T-time-call 911 as soon as signs and symptoms begin-DO NOT go Back to bed or wait to see if you get better-TIME IS BRAIN. Warning Signs of HEART ATTACK Call 911 if you have these symptoms: 
? Chest discomfort. Most heart attacks involve discomfort in the center of the chest that lasts more than a few minutes, or that goes away and comes back. It can feel like uncomfortable pressure, squeezing, fullness, or pain. ? Discomfort in other areas of the upper body. Symptoms can include pain or discomfort in one or both arms, the back, neck, jaw, or stomach. ? Shortness of breath with or without chest discomfort. ? Other signs may include breaking out in a cold sweat, nausea, or lightheadedness. Don't wait more than five minutes to call [address]! Fast action can save your life. Calling 911 is almost always the fastest way to get lifesaving treatment. Emergency Medical Services staff can begin treatment when they arrive  up to an hour sooner than if someone gets to the hospital by car. The discharge information has been reviewed with the patient and spouse. The patient and spouse verbalized understanding. Discharge medications reviewed with the patient and spouse and appropriate educational materials and side effects teaching were provided. Patient armband removed and shredded MyChart Activation Thank you for requesting access to Cytoo. Please follow the instructions below to securely access and download your online medical record. Cytoo allows you to send messages to your doctor, view your test results, renew your prescriptions, schedule appointments, and more. How Do I Sign Up? 1. In your internet browser, go to [email or web address]. 2. Click on the First Time User? Click Here link in the Sign In box. You will see the New Member Sign Up page. 3. Enter your Cytoo Access Code exactly as it appears below. You will not need to use this code after youve completed the sign-up process. If you do not sign up before the expiration date, you must request a new code. Cytoo Access Code: [email or web address] Expires: 2017  3:27 PM (This is the date your Cytoo access code will ) 4. Enter the last four digits of your Social Security Number (xxxx) and Date of Birth (mm/dd/yyyy) as indicated and click Submit. You will be taken to the next sign-up page. 5. Create a Cytoo ID. This will be your Cytoo login ID and cannot be changed, so think of one that is secure and easy to remember. 6. Create a Cytoo password. You can change your password at any time. 7. Enter your Password Reset Question and Answer. This can be used at a later time if you forget your password. 8. Enter your e-mail address. You will receive e-mail notification when new information is available in [address] 9. Click Sign Up. You can now view and download portions of your medical record. 10. Click the Download Summary menu link to download a portable copy of your medical information. Additional Information If you have questions, please visit the Frequently Asked Questions section of the [employer] website at [email or web address] Remember, [employer] is NOT to be used for urgent needs. For medical emergencies, dial 911. Discharge Orders None Introducing [facility] [place] SERVICES! Ria Alexandra introduces [employer] patient portal. Now you can access parts of your medical record, email your doctor's office, and request medication refills online. 1. In your internet browser, go to [email or web address] 2. Click on the First Time User? Click Here link in the Sign In box. You will see the New Member Sign Up page. 3. Enter your [employer] Access Code exactly as it appears below. You will not need to use this code after youve completed the sign-up process. If you do not sign up before the expiration date, you must request a new code. · [employer] Access Code: [email or web address] Expires: 4/11/2017  3:27 PM 
 
4. Enter the last four digits of your Social Security Number (xxxx) and Date of Birth (mm/dd/yyyy) as indicated and click Submit. You will be taken to the next sign-up page. 5. Create a [employer] ID. This will be your [employer] login ID and cannot be changed, so think of one that is secure and easy to remember. 6. Create a [employer] password. You can change your password at any time. 7. Enter your Password Reset Question and Answer. This can be used at a later time if you forget your password. 8. Enter your e-mail address. You will receive e-mail notification when new information is available in [address] 9. Click Sign Up. You can now view and download portions of your medical record. 10. Click the Download Summary menu link to download a portable copy of your medical information. If you have questions, please visit the Frequently Asked Questions section of the [employer] website. Remember, [employer] is NOT to be used for urgent needs. For medical emergencies, dial 911. Now available from your iPhone and Android! General Information Please provide this summary of care documentation to your next provider. Patient Signature:  ____________________________________________________________ Date:  ____________________________________________________________  
  
Comfort  Provider Signature:  ____________________________________________________________ Date:  ____________________________________________________________

## 2017-03-13 NOTE — PROCEDURES
PROCEDURES   - Generator changeout of Medtronic biventricular automatic implantable cardioverter defibrillator.   - Sedation with anesthesia assistance. Diagnosis: Primary cardiologist Dr. Julio C Fernandez at [address] 2008, changeout 2012. Initially placed for primary prevention and heart failure at that time. -h/o transient NICM with EF 25-30% in 2008, 50% by echo July 2016. Cath 2008 without critical disease.  -Chronic class II diastolic heart failure, compensated  -h/o paroxysmal atrial fibrillation on Eliquis  -h/o DVT May 2016 on Eliquis  -Diabetes  -Remote breast cancer  -Recent melena, s/p EGD/colonscopy 3/7/17  -Life expectancy > 1 year  -Baseline LBBB with QRS > 120 msec  -Chronic BB therapy  -Unable to tolerate ARB/ACEI due to hypotension    PROCEDURE: After informed consent was obtained, the patient was brought to the electrophysiology lab in a fasting, nonsedated state. The left chest was prepped and draped in the usual sterile fashion. Local lidocaine was infiltrated just below the left clavicle. A 4-cm transverse incision was created in the left chest above the old device. Using electrocautery and blunt dissection, the device was exposed and removed from the pocket. The leads were detached. Hemostasis was confirmed. The pocket was washed with antibiotic solution and the generator was attached to the leads placed in the pocket and sutured in place. The pocket was closed with 2-0 Vicryl in 2 deep layers. Skin was closed with 4-0 monocryl. Steri-Strips and a dressing were applied. No DFT testing was performed, as previously discussed. DEVICE DETAILS:  See separate scanned report. IMPRESSION:   -Successful generator changeout of Medtronic biventricular AICD. -Plan to discharge home later today.  -Resume Eliquis in 72 hours.  -Wound check in my office in 1 weeks, then see Dr. Arthur Figueredo, primary cardiologist, in 4-6 weeks.   -Given Percocet for pain.

## 2017-03-13 NOTE — ROUTINE PROCESS
TRANSFER - IN REPORT:    Verbal report received from [address] [place] [address] Donaldo RN(name) on Chidi Thompson  being received from [facility](unit) for routine progression of care      Report consisted of patients Situation, Background, Assessment and   Recommendations(SBAR). Information from the following report(s) SBAR, Procedure Summary and MAR was reviewed with the receiving nurse. Opportunity for questions and clarification was provided. Assessment completed upon patients arrival to unit and care assumed.

## 2017-03-13 NOTE — ANESTHESIA PREPROCEDURE EVALUATION
Anesthetic History   No history of anesthetic complications            Review of Systems / Medical History  Patient summary reviewed and pertinent labs reviewed    Pulmonary    COPD          Pertinent negatives: No smoker     Neuro/Psych   Within defined limits           Cardiovascular    Hypertension        Dysrhythmias   CAD         GI/Hepatic/Renal     GERD      Liver disease     Endo/Other  Within defined limits           Other Findings   Comments: Current Smoker? NO       Elective Surgery? Yes       Abstained from smoking 24 hours prior to anesthesia? N/A    Risk Factors for Postoperative nausea/vomiting:       History of postoperative nausea/vomiting? NO       Female? YES       Motion sickness? NO       Intended opioid administration for postoperative analgesia?   NO           Physical Exam    Airway  Mallampati: III  TM Distance: 4 - 6 cm  Neck ROM: normal range of motion   Mouth opening: Normal     Cardiovascular  Regular rate and rhythm,  S1 and S2 normal,  no murmur, click, rub, or gallop             Dental    Dentition: Bridges     Pulmonary  Breath sounds clear to auscultation               Abdominal  Abdominal exam normal       Other Findings            Anesthetic Plan    ASA: 3  Anesthesia type: MAC          Induction: Intravenous  Anesthetic plan and risks discussed with: Patient

## 2017-03-13 NOTE — PROGRESS NOTES
Pt ambulatory to treatment area, gowned, prepped per protocol, placed on cm, iv x 1, chart reviewed, consented

## 2017-03-23 ENCOUNTER — CLINICAL SUPPORT (OUTPATIENT)
Dept: CARDIOLOGY CLINIC | Age: 72
End: 2017-03-23

## 2017-03-23 DIAGNOSIS — I42.9 CARDIOMYOPATHY, IDIOPATHIC (HCC): Primary | ICD-10-CM

## 2017-03-23 DIAGNOSIS — Z45.02 AICD AT END OF BATTERY LIFE: ICD-10-CM

## 2017-03-29 NOTE — PROGRESS NOTES
I have personally seen and evaluated the device findings. Interrogation reviewed and I agree with assessment.     Katharine Muniz

## 2017-04-19 ENCOUNTER — OFFICE VISIT (OUTPATIENT)
Dept: PULMONOLOGY | Age: 72
End: 2017-04-19

## 2017-04-19 VITALS
TEMPERATURE: 98.8 F | WEIGHT: 213 LBS | OXYGEN SATURATION: 97 % | HEIGHT: 65 IN | BODY MASS INDEX: 35.49 KG/M2 | DIASTOLIC BLOOD PRESSURE: 80 MMHG | RESPIRATION RATE: 22 BRPM | SYSTOLIC BLOOD PRESSURE: 140 MMHG | HEART RATE: 70 BPM

## 2017-04-19 DIAGNOSIS — J42 CHRONIC BRONCHITIS, UNSPECIFIED CHRONIC BRONCHITIS TYPE (HCC): ICD-10-CM

## 2017-04-19 DIAGNOSIS — R59.0 MEDIASTINAL ADENOPATHY: ICD-10-CM

## 2017-04-19 DIAGNOSIS — J47.9 BRONCHIECTASIS WITHOUT COMPLICATION (HCC): Primary | ICD-10-CM

## 2017-04-19 DIAGNOSIS — I26.99 OTHER PULMONARY EMBOLISM WITHOUT ACUTE COR PULMONALE, UNSPECIFIED CHRONICITY (HCC): ICD-10-CM

## 2017-04-19 DIAGNOSIS — J44.9 COPD, MILD (HCC): ICD-10-CM

## 2017-04-19 RX ORDER — ALBUTEROL SULFATE 90 UG/1
1 AEROSOL, METERED RESPIRATORY (INHALATION)
Qty: 1 INHALER | Refills: 4 | Status: SHIPPED | OUTPATIENT
Start: 2017-04-19 | End: 2018-02-20

## 2017-04-19 NOTE — PROGRESS NOTES
4/19/2017:  Patient feels at baseline. Stopped using spiriva respimet as she did not feel it helped. Uses Albuterol PRN prior to season changes/weather changes and activity. Denies increase in cough or productive expectoration. Denies chest pain. C/o RAMOS which limits her ADL's-  Cannot walk distance/ climb stairs. Not able to exercise due to SOB. Had PFT and 6 min walk completed today and is here to discuss further interventions.       HPI: Ms. Prosper Paez who carries history of COPD, recurrent bronchitis requiring visit to Urgent care, Diagnosis of PE on NOAC , Cardiomyopathy S/P pacemaker and defibrillator, and history of left breast cancer is evaluated for COPD/Chronic bronchitis work up. Last CT performed in MAY 2016 demonstrated PE, nonspecific mediastinal adenopathy, minimal atelectasis/small effusion, and hyperinflation. We have obtained follow up CT chest performed on 09/12/16 did not reveal any finding concerning PE or Mediastinal adenopathy. . During this interval time patient has not noticed any symptoms concerning COPD exacerbation. Her  RAMOS remains stable. 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 Subsequent follow ups- never has had TB. Allergies   Allergen Reactions    Lisinopril Rash     Hypertensive crisis    Tetracycline Anaphylaxis, Rash and Swelling    Hibiclens [Chlorhexidine Gluconate] Other (comments)     Turned red and BP drop low.  Other Medication Rash and Swelling     -Myacins     Current Outpatient Prescriptions   Medication Sig Dispense Refill    ELIQUIS 5 mg tablet Take 1 Tab by mouth two (2) times a day. 120 Tab 3    escitalopram oxalate (LEXAPRO) 20 mg tablet Take 20 mg by mouth daily.  metFORMIN ER (GLUCOPHAGE XR) 500 mg tablet Take 500 mg by mouth daily (with dinner).  metoprolol tartrate (LOPRESSOR) 100 mg IR tablet TAKE ONE TABLET BY MOUTH TWICE DAILY 180 Tab 3    exemestane (AROMASIN) 25 mg tablet Take 25 mg by mouth daily.       pravastatin (PRAVACHOL) 80 mg tablet Take 80 mg by mouth nightly.  oxyCODONE-acetaminophen (PERCOCET) 5-325 mg per tablet Take 1 Tab by mouth every six (6) hours as needed for Pain. Max Daily Amount: 4 Tabs. 20 Tab 0     Review of Systems   Constitutional: Negative. HENT: Negative. Eyes: Negative. Respiratory: Negative. Cardiovascular: Negative. Gastrointestinal: Negative. Genitourinary: Negative. Musculoskeletal: Negative. Skin: Negative. Neurological: Negative. Endo/Heme/Allergies: Negative. Psychiatric/Behavioral: Negative. Blood pressure 140/80, pulse 70, temperature 98.8 °F (37.1 °C), temperature source Oral, resp. rate 22, height 5' 5\" (1.651 m), weight 96.6 kg (213 lb), SpO2 97 %. Physical Exam   Constitutional: She is oriented to person, place, and time and well-developed, well-nourished, and in no distress. HENT:   Head: Normocephalic and atraumatic. Eyes: EOM are normal. Pupils are equal, round, and reactive to light. Neck: Normal range of motion. Neck supple. No thyromegaly present. Cardiovascular: Normal rate and regular rhythm. Pulmonary/Chest: Effort normal. She has no wheezes. She has no rales. Abdominal: Soft. Bowel sounds are normal.   Musculoskeletal: Normal range of motion. She exhibits no edema. Neurological: She is alert and oriented to person, place, and time. Skin: Skin is warm and dry. Psychiatric: Affect and judgment normal.       Investigation:      Results from Hospital Encounter encounter on 09/12/16   CTA CHEST W WO CONT   Narrative CT chest with contrast for PE    CPT CODE: 07360     HISTORY: COPD, mediastinal adenopathy. On anticoagulant therapy    COMPARISON: 5/10/16. TECHNIQUE: Dynamic spiral scan through the chest is obtained from the thoracic  inlet to the diaphragm after dynamic nonionic IV contrast administration  per PE  protocol.  Coronal and sagittal MIP computer reconstructions are also obtained  for better visualization of the integrity of pulmonary arteries in 3D dimension,  particularly for lobar/interlobar arterial branches and to minimize radiation  dose. All CT scans at this facility performed using dose optimization techniques as  appreciated to a performed exam, to include automated exposure control,  adjustment of the mA and or KU according to patient size (including appropriate  matching for site specific examination), or use of iterative reconstruction  technique. CONTRAST: 100 cc Isovue 370. FINDINGS:    PULMONARY ARTERIES: The contrast bolus is adequate. The right and left mainstem  pulmonary arteries and their branches appear patent without convincing evidence  of intraluminal filling defect identified to suggest pulmonary embolism. Multiple intraluminal clots seen in right segmental/subsegmental branches seen  on prior and no longer evident today. There is again borderline prominence of  main pulmonary arteries. AORTA AND THE GREAT VESSELS: Unremarkable. LUNG PARENCHYMA: Chronic peripheral atelectasis at the lateral left lower lobe  again noted. Mild COPD and bronchiectasis. No acute pulmonary infarction or  infiltration seen. IMAGED THYROID: Unremarkable. MEDIASTINUM: No adenopathy. HEART: The heart and the pericardium appear unremarkable. PLEURAL SPACES AND CHEST WALL: The small right pleural effusion has been  interval resolved. . The ovoid and fluid attenuation lesion in posterior left  breast is again identified with no significant interval change in size and  appearance. It measures 2.7 x 4.0 cm. VISUALIZED UPPER ABDOMEN: Unremarkable. OSSEOUS STRUCTURES: Unremarkable. Impression IMPRESSION:    1. No convincing CT evidence of pulmonary embolism. Interval resolution of  multifocal right sided PE seen on prior CT. 2.  No acute pulmonary finding. Mild COPD and bronchiectasis. 3. Interval resolution of small right pleural effusion.     4. Stable fluid attenuation lesion in posterior left breast, probably post  procedure seroma? Ultrasound follow-up advised. Thank you for your referral.            CT chest (05/10/16): There are filling defects in the upper, middle, and lower lobe branches of the right pulmonary artery. No evidence of embolus in the left pulmonary artery branches or in the central trunks. The main pulmonary artery measures 3.3 cm in diameter. The thoracic aorta is not aneurysmal.  No evidence for dissection.      Lymph nodes: Mediastinal lymph nodes are mildly prominent. A right paratracheal lymph node measures 1.2 x 0.9 cm. Previously, this lymph node measured 1.8 x 0.4 cm. There is no evidence of hilar lymphadenopathy.      Thyroid: Normal in size without mass in the visualized parenchyma. .      Mediastinum: There is a pacemaker in the left chest wall. A fluid collection in the inferior left breast measures about 3.3 x 4.8 cm (previously, 2.8 x 4.4 cm). There is no evidence of peripheral enhancement. 2 adjacent biopsy clips are redemonstrated. There is a small hiatal hernia.      Lungs:    The lungs are diffusely hyperinflated with mild tracheobronchomalacia. Right Lung: There is a small posterior effusion with overlying atelectasis. No confluent infiltrate to suggest infarct.      Left Lung:  No evidence of infiltrate. There is a small focus of atelectasis in the posterior lingula. This is similar to previous exam. No pleural effusion.      Abdomen: There is diffuse fatty atrophy of the pancreas. Visualized portions of the liver, spleen, pancreas, adrenal glands, and kidneys are unremarkable. The gallbladder is normal.      Bones: A fracture deformity in the left anterior fifth rib is unchanged. Degenerative changes of the spine are stable. 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 There are no destructive lesions.      IMPRESSION:      1.  Multiple right pulmonary emboli.  No evidence of left pulmonary emboli or pulmonary infarcts. .   2.  Small right pleural effusion. 3. Prominence of the main portal artery is suggestive of pulmonary artery hypertension. 4. COPD and mild tracheobronchomalacia. 5. Similar size of seroma in the left chest wall.    .          PFT(2008): mild obstructive changes   DATE 4/19/2017  Pre bronchodilator ( 2008) Post Bronchodilator (2008)   FVC 2. 11( 69)  2.12(67) 2.13(67)   FEV1 1.60( 69)  1.69(73) 1.64(71)   FEV1/FVC 76  79(73) 76   TLC 4.04(78)  4.32(84)     RV 2.14(79)  2.12(107)     RV/TLC   49(38)     DLCO 13.76( 61)  12.47(66)        PFT 4/19/17:  Mild to moderate restrictive impairment with reduced DLCO. Assessment:    Bronchiectasis: Recurrent bronchitis/COPD exacerbation with Mild-Moderate Restrictive impairment ? sequelae of prior inflammatory process  History of COPD   Nonspecific Mediastinal adenopathy- S/P follow up CT chest demonstrated interval resolution    PE on NOAC ,5/206. Resolved on follow up CT in 9/2016  Atrial fibrillation  H/o breast biopsy- 2010. H/o radiation         Plan: Will discontinue Spiriva respimat - predominant restrictive impairment  Continue Albuterol two puffs, every six hours as needed   Continue ELIQUIS as prescribed   Discussed  PFT and Six minute walk  Oxygen requirement. - desaturates and corrects with 3L from 87% on room air to 94% with 3L O2  Encouraged active lifestyle and endurance activities with oxygen supplementation.   RTC in six  months time      Megan Menendez  Pulmonary and Critical Care Medicine

## 2017-04-19 NOTE — MR AVS SNAPSHOT
Visit Information Date & Time Provider Department Dept. Phone Encounter #  
 4/19/2017 11:45 AM Christina Washington MD [facility] [id number] Follow-up Instructions Return in about 6 months (around 10/19/2017). Your Appointments 8/21/2017  3:00 PM  
Follow Up with Lilly Fofana MD  
[facility] ([address]) Appt Note: 6 month follow up [place] [phone]  
[phone] [address] [place] [address] [address] Upcoming Health Maintenance Date Due DTaP/Tdap/Td series (1 - Tdap) 12/27/1965 BREAST CANCER SCRN MAMMOGRAM 12/27/1994 FOBT Q 1 YEAR AGE 50-75 12/27/1994 ZOSTER VACCINE AGE 60> 12/27/2004 GLAUCOMA SCREENING Q2Y 12/27/2009 Pneumococcal 65+ Low/Medium Risk (1 of 2 - PCV13) 12/27/2009 MEDICARE YEARLY EXAM 12/27/2009 INFLUENZA AGE 9 TO ADULT 8/1/2016 Allergies as of 4/19/2017  Review Complete On: 4/19/2017 By: Christina Washington MD  
  
 Severity Noted Reaction Type Reactions Lisinopril High 07/23/2010    Rash Hypertensive crisis Tetracycline High 08/08/2012    Anaphylaxis, Rash, Swelling Hibiclens [Chlorhexidine Gluconate]  03/06/2017    Other (comments) Turned red and BP drop low. Other Medication  07/23/2010    Rash, Swelling  
 -Myacins Current Immunizations  Never Reviewed No immunizations on file. Not reviewed this visit You Were Diagnosed With   
  
 Codes Comments Chronic obstructive pulmonary disease, unspecified COPD type ([facility]     ICD-10-CM: J44.9 ICD-9-CM: 460 Mediastinal adenopathy     ICD-10-CM: R59.0 ICD-9-CM: 785.6 Other acute pulmonary embolism without acute cor pulmonale (HCC)     ICD-10-CM: I26.99 
ICD-9-CM: 415.19   
 COPD, mild ([facility]     ICD-10-CM: J44.9 ICD-9-CM: 520 Chronic bronchitis, unspecified chronic bronchitis type ([facility]     ICD-10-CM: X93 ICD-9-CM: 491.9 Other pulmonary embolism without acute cor pulmonale, unspecified chronicity (HCC)     ICD-10-CM: I26.99 
ICD-9-CM: 415.19 Vitals BP Pulse Temp Resp Height(growth percentile) Weight(growth percentile) 140/80 (BP 1 Location: Left arm, BP Patient Position: At rest) 70 98.8 °F (37.1 °C) (Oral) 22 5' 5\" (1.651 m) 213 lb (96.6 kg) SpO2 BMI OB Status Smoking Status 97% 35.45 kg/m2 Postmenopausal Former Smoker BMI and BSA Data Body Mass Index Body Surface Area  
 35.45 kg/m 2 2.1 m 2 Preferred Pharmacy Pharmacy Name Phone [facility] [address] [place] [address] [phone] Your Updated Medication List  
  
   
This list is accurate as of: 4/19/17 12:44 PM.  Always use your most recent med list.  
  
  
  
  
 albuterol 90 mcg/actuation inhaler Commonly known as:  PROVENTIL HFA, VENTOLIN HFA, PROAIR HFA Take 1 Puff by inhalation every six (6) hours as needed for Wheezing. ELIQUIS 5 mg tablet Generic drug:  apixaban Take 1 Tab by mouth two (2) times a day. escitalopram oxalate 20 mg tablet Commonly known as:  Aliene Apo Take 20 mg by mouth daily. exemestane 25 mg tablet Commonly known as:  [place] Take 25 mg by mouth daily. metFORMIN  mg tablet Commonly known as:  GLUCOPHAGE XR Take 500 mg by mouth daily (with dinner). metoprolol tartrate 100 mg IR tablet Commonly known as:  LOPRESSOR  
TAKE ONE TABLET BY MOUTH TWICE DAILY  
  
 oxyCODONE-acetaminophen 5-325 mg per tablet Commonly known as:  PERCOCET Take 1 Tab by mouth every six (6) hours as needed for Pain. Max Daily Amount: 4 Tabs. PRAVACHOL 80 mg tablet Generic drug:  pravastatin Take 80 mg by mouth nightly. Prescriptions Sent to Pharmacy Refills albuterol (PROVENTIL HFA, VENTOLIN HFA, PROAIR HFA) 90 mcg/actuation inhaler 4 Sig: Take 1 Puff by inhalation every six (6) hours as needed for Wheezing. Class: Normal  
 [facility] [address] [place] [address] Ph #: [phone] Route: Inhalation We Performed the Following AMB POC PFT COMPLETE W/BRONCHODILATOR [19044 CPT(R)] AMB POC PULMONARY STRESS TESTING,SIMPLE [68361 CPT(R)] AMB SUPPLY ORDER [[id number] Custom] Comments:  
 Oxygen at 3 L via canula with activity Home Oxygen Qualification Testing Room Air SpO2 at rest= 87 % SpO2 while ambulating on oxygen at 2-3  liters per minute= 97%    
 DIFFUSING CAPACITY [22862 CPT(R)] GAS DILUT/WASHOUT LUNG VOL W/WO DISTRIB VENT&VOL [81415 CPT(R)] Follow-up Instructions Return in about 6 months (around 10/19/2017). Introducing hospitals & HEALTH SERVICES! [employer] introduces [employer] patient portal. Now you can access parts of your medical record, email your doctor's office, and request medication refills online. 1. In your internet browser, go to [email or web address] 2. Click on the First Time User? Click Here link in the Sign In box. You will see the New Member Sign Up page. 3. Enter your [employer] Access Code exactly as it appears below. You will not need to use this code after youve completed the sign-up process. If you do not sign up before the expiration date, you must request a new code. · [employer] Access Code: [email or web address] Expires: 7/18/2017 12:40 PM 
 
4. Enter the last four digits of your Social Security Number (xxxx) and Date of Birth (mm/dd/yyyy) as indicated and click Submit. You will be taken to the next sign-up page. 5. Create a [employer] ID. This will be your [employer] login ID and cannot be changed, so think of one that is secure and easy to remember. 6. Create a [employer] password. You can change your password at any time. 7. Enter your Password Reset Question and Answer. This can be used at a later time if you forget your password. 8. Enter your e-mail address. You will receive e-mail notification when new information is available in [address] 9. Click Sign Up. You can now view and download portions of your medical record. 10. Click the Download Summary menu link to download a portable copy of your medical information. If you have questions, please visit the Frequently Asked Questions section of the [employer] website. Remember, [employer] is NOT to be used for urgent needs. For medical emergencies, dial 911. Now available from your iPhone and Android! Please provide this summary of care documentation to your next provider. Your primary care clinician is listed as Dilip Anne. If you have any questions after today's visit, please call [phone].

## 2017-04-19 NOTE — PROGRESS NOTES
[facility]    [address], [address], [address],[place] [address]      SIMPLE PULMONARY STRESS TEST - 6 MINUTE WALK    PATIENT NAME: Rekha Shaw    DATE: 4/19/2017     YOB: 1945     AGE: 66 y.o. DIAGNOSIS:   Encounter Diagnoses   Name Primary?  Chronic obstructive pulmonary disease, unspecified COPD type ([address]     Mediastinal adenopathy     Other acute pulmonary embolism without acute cor pulmonale (HCC)     COPD, mild (HCC)     Chronic bronchitis, unspecified chronic bronchitis type (HCC)     Other pulmonary embolism without acute cor pulmonale, unspecified chronicity (HCC)          TECHNICIAN: Laure Goodman, RT         PHYSICIAN: Dr Fabrice Casey MD      Visit Vitals    /80 (BP 1 Location: Left arm, BP Patient Position: At rest)    Pulse 70    Temp 98.8 °F (37.1 °C) (Oral)    Resp 22    Ht 5' 5\" (1.651 m)    Wt 213 lb (96.6 kg)    SpO2 97%    BMI 35.45 kg/m2        RESTING DATA:  Dyspnea Scale (1-10):    3 SOB    EXERCISE DATA:   6 MINUTE WALK - HALLWAY (34 METERS)      1   RA    88 % SAT    87 HR   3 SOB    1 Laps x 34m = 34m  2   RA    87 % SAT    88 HR   4 SOB    1 Laps x 34m = 34m  3   2L    88 % SAT     89 HR   3 SOB    1 Laps x 34m = 34m  4   3L    97 % SAT     90 HR   3 SOB    1 Laps x 34m = 34m  5   3L    94 % SAT     97 HR   3 SOB    1 Laps x 34m = 34m  6   3L    94 % SAT   108 HR   4 SOB    1 Laps x 34m = 34m    TOTAL DISTANCE:   204 M    RECOVERY DATA:      1   RA    98 % SAT   70 HR   24 RR   150/80 BP   3 SOB  2   RA    96 % SAT   69 HR   22 RR   140/80 BP   3 SOB      TECHNICIAN COMMENTS: Patient tolerated 6 minute walk well. Patients 02 saturation level decreased at the start of walk to 88% while on room air and decreased to to 87%. Patient placed on nasal cannula at 2/lpm and 02 saturation level only increased to 88%.  0xygen liter flow increased to 3/lpm and 02 saturation level increased to 97% and did not drop below 94% for remainder of walk. Patient placed on room air during resting stage and 02 saturation level remained stable between 98 - 97% heart rate dropped from a high during walk of 108 bpm to 70 bpm at rest. Patient's blood pressure remained unchanged.

## 2017-05-11 ENCOUNTER — TELEPHONE (OUTPATIENT)
Dept: PULMONOLOGY | Age: 72
End: 2017-05-11

## 2017-05-11 NOTE — TELEPHONE ENCOUNTER
Yaya Chowdary from [employer] called about o2 order for pt. She said that they dropped an order off on Monday that needs to be changed to say that the pt needs portable o2. I informed her that Dr. Beverly Hinkle was not in the office this week and they asked if Dr. Jose Luis Mohan would sign it. Is this something Dr. Jose Luis Mohan would do tomorrow while she is in the office?

## 2017-08-21 ENCOUNTER — OFFICE VISIT (OUTPATIENT)
Dept: CARDIOLOGY CLINIC | Age: 72
End: 2017-08-21

## 2017-08-21 ENCOUNTER — CLINICAL SUPPORT (OUTPATIENT)
Dept: CARDIOLOGY CLINIC | Age: 72
End: 2017-08-21

## 2017-08-21 VITALS
WEIGHT: 215 LBS | SYSTOLIC BLOOD PRESSURE: 112 MMHG | HEART RATE: 60 BPM | OXYGEN SATURATION: 96 % | DIASTOLIC BLOOD PRESSURE: 70 MMHG | HEIGHT: 65 IN | BODY MASS INDEX: 35.82 KG/M2

## 2017-08-21 DIAGNOSIS — I27.82 OTHER CHRONIC PULMONARY EMBOLISM WITHOUT ACUTE COR PULMONALE (HCC): ICD-10-CM

## 2017-08-21 DIAGNOSIS — I42.9 CARDIOMYOPATHY, IDIOPATHIC (HCC): Primary | ICD-10-CM

## 2017-08-21 DIAGNOSIS — Z95.810 BIVENTRICULAR IMPLANTABLE CARDIOVERTER-DEFIBRILLATOR IN SITU: ICD-10-CM

## 2017-08-21 DIAGNOSIS — I48.0 PAF (PAROXYSMAL ATRIAL FIBRILLATION) (HCC): ICD-10-CM

## 2017-08-21 NOTE — MR AVS SNAPSHOT
Visit Information Date & Time Provider Department Dept. Phone Encounter #  
 8/21/2017  3:00 PM Amanda Vital MD [facility] [phone] [id number] Upcoming Health Maintenance Date Due DTaP/Tdap/Td series (1 - Tdap) 12/27/1965 BREAST CANCER SCRN MAMMOGRAM 12/27/1994 FOBT Q 1 YEAR AGE 50-75 12/27/1994 ZOSTER VACCINE AGE 60> 10/27/2004 GLAUCOMA SCREENING Q2Y 12/27/2009 Pneumococcal 65+ Low/Medium Risk (1 of 2 - PCV13) 12/27/2009 MEDICARE YEARLY EXAM 12/27/2009 INFLUENZA AGE 9 TO ADULT 8/1/2017 Allergies as of 8/21/2017  Review Complete On: 8/21/2017 By: Amanda Vital MD  
  
 Severity Noted Reaction Type Reactions Lisinopril High 07/23/2010    Rash Hypertensive crisis Tetracycline High 08/08/2012    Anaphylaxis, Rash, Swelling Hibiclens [Chlorhexidine Gluconate]  03/06/2017    Other (comments) Turned red and BP drop low. Other Medication  07/23/2010    Rash, Swelling  
 -Myacins Current Immunizations  Never Reviewed No immunizations on file. Not reviewed this visit You Were Diagnosed With   
  
 Codes Comments [facility]    -  Primary ICD-10-CM: I42.8 ICD-9-CM: 425.4 PAF (paroxysmal atrial fibrillation) (HCC)     ICD-10-CM: I48.0 ICD-9-CM: 427.31 Left bundle branch block     ICD-10-CM: I44.7 ICD-9-CM: 426.3 Biventricular implantable cardioverter-defibrillator in situ     ICD-10-CM: Z95.810 ICD-9-CM: V45.02 Vitals BP Pulse Height(growth percentile) Weight(growth percentile) SpO2 BMI  
 112/70 60 5' 5\" (1.651 m) 215 lb (97.5 kg) 96% 35.78 kg/m2 OB Status Smoking Status Postmenopausal Former Smoker Vitals History BMI and BSA Data Body Mass Index Body Surface Area 35.78 kg/m 2 2.11 m 2 Preferred Pharmacy Pharmacy Name Phone [facility] [address] [place] [address] [phone] Your Updated Medication List  
  
   
This list is accurate as of: 8/21/17  4:04 PM.  Always use your most recent med list.  
  
  
  
  
 albuterol 90 mcg/actuation inhaler Commonly known as:  PROVENTIL HFA, VENTOLIN HFA, PROAIR HFA Take 1 Puff by inhalation every six (6) hours as needed for Wheezing. CENTRUM SILVER WOMEN PO Take  by mouth. ELIQUIS 5 mg tablet Generic drug:  apixaban Take 1 Tab by mouth two (2) times a day. escitalopram oxalate 20 mg tablet Commonly known as:  Jorge Alberto Rash Take 20 mg by mouth daily. exemestane 25 mg tablet Commonly known as:  Katlyn Garter Take 25 mg by mouth daily. metFORMIN  mg tablet Commonly known as:  GLUCOPHAGE XR Take 500 mg by mouth daily (with dinner). metoprolol tartrate 100 mg IR tablet Commonly known as:  LOPRESSOR  
TAKE ONE TABLET BY MOUTH TWICE DAILY PRAVACHOL 80 mg tablet Generic drug:  pravastatin Take 80 mg by mouth nightly. We Performed the Following AMB POC EKG ROUTINE W/ 12 LEADS, INTER & REP [60913 CPT(R)] To-Do List   
 08/30/2017 1:30 PM  
  Appointment with [facility] RM 1 at [facility] ([phone]) OUTSIDE FILMS  - Any outside films related to the study being scheduled should be brought with you on the day of the exam.  If this cannot be done there may be a delay in the reading of the study. MEDICATIONS  - Patient must bring a complete list of all medications currently taking to include prescriptions, over-the-counter meds, herbals, vitamins & any dietary supplements - Patient must discontinue use of calcium, vitamins, or calcium supplements including antacids (calcium based) 24 hours before scan. GENERAL INSTRUCTIONS  - [facility] cannot accommodate patients on stretchers, patient must be able to walk into the room and be able to sit up for a portion of the exam.  
  
  
Introducing [facility]! Willadean Saint introduces [employer] patient portal. Now you can access parts of your medical record, email your doctor's office, and request medication refills online. 1. In your internet browser, go to [email or web address] 2. Click on the First Time User? Click Here link in the Sign In box. You will see the New Member Sign Up page. 3. Enter your [employer] Access Code exactly as it appears below. You will not need to use this code after youve completed the sign-up process. If you do not sign up before the expiration date, you must request a new code. · [employer] Access Code: [email or web address] Expires: 11/7/2017 10:50 AM 
 
4. Enter the last four digits of your Social Security Number (xxxx) and Date of Birth (mm/dd/yyyy) as indicated and click Submit. You will be taken to the next sign-up page. 5. Create a [employer] ID. This will be your [employer] login ID and cannot be changed, so think of one that is secure and easy to remember. 6. Create a [employer] password. You can change your password at any time. 7. Enter your Password Reset Question and Answer. This can be used at a later time if you forget your password. 8. Enter your e-mail address. You will receive e-mail notification when new information is available in [address] 9. Click Sign Up. You can now view and download portions of your medical record. 10. Click the Download Summary menu link to download a portable copy of your medical information. If you have questions, please visit the Frequently Asked Questions section of the [employer] website. Remember, [employer] is NOT to be used for urgent needs. For medical emergencies, dial 911. Now available from your iPhone and Android! Please provide this summary of care documentation to your next provider. Your primary care clinician is listed as [place] . If you have any questions after today's visit, please call [phone].

## 2017-08-21 NOTE — PROGRESS NOTES
1. Have you been to the ER, urgent care clinic since your last visit? Hospitalized since your last visit? no  2. Have you seen or consulted any other health care providers outside of the [facility] since your last visit? Include any pap smears or colon screening.   no

## 2017-08-21 NOTE — PROGRESS NOTES
HISTORY OF PRESENT ILLNESS  Khurram Schultz is a 66 y.o. female. HPI  Cardiac wise, she has been feeling reasonably well. She has had some chest pain, which is a sharp and stabbing-type nonexertional pain, most likely noncardiac in nature. She continues with mild dyspnea on exertion, which has not changed much. She has had occasional palpitations as flip-flops or a racing heartbeat, but the interrogation of her ICD demonstrated no recurrent atrial fibrillation. She has had no dizziness or syncope. She has had no symptoms to indicate TIA or amaurosis fugax. The OptiVol is normal - indicating normal volume status. She has history of a left bundle branch block, hypertension, dyslipidemia and glucose intolerance. She has a 20 pack a year cigarette smoking history until 1988. She was told that she had a heart murmur and hole in her heart as a child, but this has not been clearly documented by echocardiogram or other diagnostic workup. She was seen by Dr. Shanthi Carpenter in 2003, for the evaluation of left bundle branch block and had negative stress nuclear cardiac imaging. She was told that everything was okay by Dr. Shanthi Carpenter at that time. She had repeat stress nuclear cardiac imaging on September 17, 2007, which demonstrated a moderate, fixed perfusion defect in the basal inferior and mid inferior wall, involving the inferior apex as well, with no significant ischemia. There was also moderately severe scarring in the anterior wall, anterior apex and anterior septum, with very mild associated ischemia. There was an akinetic interventricular septum and anterior apex, and mild hypokinesis of the proximal anterior wall, with moderate hypokinesis of the entire inferior wall, with overall EF in the 25% range. She subsequently underwent cardiac catheterization on February 27, 2008 which demonstrated patent coronary arteries, but severe LV dysfunction with EF in the 25-30% range.  She then went on to have biventricular ICD implantation on May 12, 2008. Her EF has improved to 56% by repeat echocardiogram in 2009. She had a repeat echocardiogram on August 13, 2010, which demonstrated once again improved LV function, with EF in the 55% range. Left atrial volume was normal at 23 ml/m². PA pressure was estimated normal as well.    Because of shortness of breath she had repeat echocardiogram on 4/16/2012 with demonstrated normal LV function with the EF in the 60 to 65% range. There was grade 1 diastolic dysfunction. PA pressure was estimated in the range of 25 to 30 mmHg. There was no significant valvular pathology.    She had an ultrasound examination of the aorta, which demonstrated possible aneurysm; however, the CT scan demonstrated no evidence of aneurysm whatsoever. She does not feel the palpitations much herself; however, her ICD interrogation demonstrated frequent episodes of atrial fibrillation, at times, very prolonged for hours. She had problems with worsening shortness of breath and she developed a cough and fever, and was seen in the emergency room on 05/30/16. She was ultimately diagnosed of DVT and pulmonary emboli by CT angiogram and has been placed on Eliquis. Her echocardiogram on 07/07/2016 demonstrated normal left ventricular size and lower limit of normal left ventricular systolic function with EF in the 50% range. There was no significant valvular pathology. Left atrial dimension was normal with a volume index of 25 mL/meter2. There was no significant pulmonary hypertension. Review of Systems   Constitutional: Negative for malaise/fatigue and weight loss. HENT: Negative for hearing loss. Eyes: Negative for blurred vision and double vision. Respiratory: Positive for shortness of breath. Cardiovascular: Positive for chest pain and palpitations. Gastrointestinal: Negative for blood in stool, heartburn and melena.    Genitourinary: Negative for dysuria, frequency, hematuria and urgency. Musculoskeletal: Negative for back pain and joint pain. Skin: Negative for itching and rash. Neurological: Negative for dizziness, loss of consciousness and weakness. Psychiatric/Behavioral: Negative for depression and memory loss. Physical Exam   Constitutional: She is oriented to person, place, and time. She appears well-developed and well-nourished. HENT:   Head: Normocephalic and atraumatic. Eyes: Conjunctivae are normal. Pupils are equal, round, and reactive to light. Neck: Normal range of motion. Neck supple. No JVD present. Cardiovascular: Normal rate, regular rhythm, S1 normal and S2 normal.   No extrasystoles are present. PMI is not displaced. Exam reveals no friction rub. Murmur heard. Harsh early systolic murmur is present with a grade of 1/6  at the upper right sternal border  Pulses:       Carotid pulses are 3+ on the right side, and 3+ on the left side. Pulmonary/Chest: Effort normal. She has no rales. Abdominal: Soft. There is no tenderness. Musculoskeletal: She exhibits no edema. Neurological: She is alert and oriented to person, place, and time. No cranial nerve deficit. Skin: Skin is warm and dry. Psychiatric: She has a normal mood and affect. Her behavior is normal.     Visit Vitals    /70    Pulse 60    Ht 5' 5\" (1.651 m)    Wt 97.5 kg (215 lb)    SpO2 96%    BMI 35.78 kg/m2       Past Medical History:   Diagnosis Date    CAD (coronary artery disease)     cardiomyopathy,CHF,,Cardiac Cath, pacemaker/defib.  Cancer [facility]) 2013    breast    Cardiac catheterization 02/27/2008    Patent coronary arteries. LVEDP 13 mmHg. EF 25-30%. Global hypk.  Cardiac echocardiogram 07/07/2016    EF 50% (prev 60-65% on study of 4/16/12). No WMA. Gr 1 DDfx. Normal RVSP.  Cardiac nuclear imaging test 05/31/2013    No evidence of ongoing ischemia or prior infarction. EF 60%. No RWMA.   Mild dyssynchrony of inferior base & mid/distal septum likely c/w pacemaker. Nondiagnostic EKG on pharm stress test due to V-pacing.  Cardiovascular abdominal aorta duplex 09/23/2015    Fusiform AAA in prox & mid portions of abdom Ao.  Chronic lung disease     COPD (chronic obstructive pulmonary disease) (HCC) chronic bronchitis    Coronary artery disease Feb. 2008    Possible CAD with nonischemic dilated cardiomyopathy/EF 25%/ improved EF up to 56% by echocardiogram.    Coronary artery disease     GERD (gastroesophageal reflux disease)     Heart failure (HCC)     Heart murmur     Hemangioma of liver     Hx of cardiomyopathy ([address]     Hypercholesterolemia     Hypertension     Left bundle branch block     Phlebitis     PVD (peripheral vascular disease) ([address]     Renal calculi 1999    Thromboembolus ([address]     below knee in R leg       Social History     Social History    Marital status:      Spouse name: N/A    Number of children: N/A    Years of education: N/A     Occupational History    Not on file.      Social History Main Topics    Smoking status: Former Smoker     Packs/day: 1.50     Years: 20.00     Types: Cigarettes     Quit date: 2/23/1988    Smokeless tobacco: Never Used    Alcohol use No    Drug use: No    Sexual activity: Not on file     Other Topics Concern    Not on file     Social History Narrative       Family History   Problem Relation Age of Onset    Cancer Mother      Cancer of the breast    Stroke Mother     Cancer Father     Heart Disease Father      CHF    COPD Brother     Heart Attack Brother      Multiple    Heart Disease Brother      CHF       Past Surgical History:   Procedure Laterality Date    COLONOSCOPY N/A 3/7/2017    COLONOSCOPY, SURVEILLANCE with polypectomy performed by Jelani Cardenas MD at [address] [place] [address] HX BREAST LUMPECTOMY Left 8/2013    cancerous lesion    HX CATARACT REMOVAL Bilateral 1980s    w/ lens implants    HX CATARACT REMOVAL Left     re vised due to implant repositioning    HX HEART CATHETERIZATION  02/27/08    Patent coronary arteries, but severe LV dysfunction w/ EF in the 25-30% range.  HX KNEE ARTHROSCOPY Right     HX MASTECTOMY Left 9/24/2014    LEFT PARTIAL MASTECTOMY WITH NEEDLE LOCALIZATION MAMMOGRAM TIMES TWO performed by Macho Cosby MD at [address] [place] [address] HX PACEMAKER  May 2008    Biventricular ICD implantation     HX PACEMAKER  2012    replacement - Medtronic    HX PELVIC LAPAROSCOPY      diagnostic - varicosities       Current Outpatient Prescriptions   Medication Sig Dispense Refill    MULTIVIT-MIN/IRON/FOLIC/LUTEIN (CENTRUM SILVER WOMEN PO) Take  by mouth.  albuterol (PROVENTIL HFA, VENTOLIN HFA, PROAIR HFA) 90 mcg/actuation inhaler Take 1 Puff by inhalation every six (6) hours as needed for Wheezing. 1 Inhaler 4    ELIQUIS 5 mg tablet Take 1 Tab by mouth two (2) times a day. 120 Tab 3    escitalopram oxalate (LEXAPRO) 20 mg tablet Take 20 mg by mouth daily.  metFORMIN ER (GLUCOPHAGE XR) 500 mg tablet Take 500 mg by mouth daily (with dinner).  metoprolol tartrate (LOPRESSOR) 100 mg IR tablet TAKE ONE TABLET BY MOUTH TWICE DAILY 180 Tab 3    exemestane (AROMASIN) 25 mg tablet Take 25 mg by mouth daily.  pravastatin (PRAVACHOL) 80 mg tablet Take 80 mg by mouth nightly. EKG: unchanged from previous tracings, occasional PVC noted, unifocal, 1:1 Biv.pacing  . ASSESSMENT and PLAN  Encounter Diagnoses   Name Primary?  Cardiomyopathy, idiopathic, EF now up to 55% Yes    PAF (paroxysmal atrial fibrillation) (HCC)     Biventricular implantable cardioverter-defibrillator in situ     Other chronic pulmonary embolism without acute cor pulmonale ([facility]    She has been doing well from a cardiac standpoint. She has had no symptoms or physical findings to indicate cardiac decompensation. She has had no recurrent atrial fibrillation by interrogation of the ICD. The OptiVol was normal - indicating normal volume status.   For now, she will be continued on the current medical regimen.

## 2017-08-22 NOTE — PROGRESS NOTES
I have personally seen and evaluated the device findings. Interrogation reviewed and I agree with assessment.     Jian Cadet

## 2017-08-24 RX ORDER — ATORVASTATIN CALCIUM 40 MG/1
40 TABLET, FILM COATED ORAL DAILY
Qty: 90 TAB | Refills: 3 | Status: SHIPPED | OUTPATIENT
Start: 2017-08-24 | End: 2018-08-28 | Stop reason: ALTCHOICE

## 2017-11-08 ENCOUNTER — OFFICE VISIT (OUTPATIENT)
Dept: PULMONOLOGY | Age: 72
End: 2017-11-08

## 2017-11-08 VITALS
DIASTOLIC BLOOD PRESSURE: 64 MMHG | SYSTOLIC BLOOD PRESSURE: 102 MMHG | HEART RATE: 60 BPM | HEIGHT: 65 IN | BODY MASS INDEX: 36.65 KG/M2 | WEIGHT: 220 LBS | RESPIRATION RATE: 16 BRPM | OXYGEN SATURATION: 95 % | TEMPERATURE: 98.3 F

## 2017-11-08 DIAGNOSIS — I27.82 OTHER CHRONIC PULMONARY EMBOLISM WITHOUT ACUTE COR PULMONALE (HCC): ICD-10-CM

## 2017-11-08 DIAGNOSIS — R06.09 DOE (DYSPNEA ON EXERTION): ICD-10-CM

## 2017-11-08 DIAGNOSIS — J47.9 BRONCHIECTASIS WITHOUT COMPLICATION (HCC): Primary | ICD-10-CM

## 2017-11-08 NOTE — MR AVS SNAPSHOT
Visit Information Date & Time Provider Department Dept. Phone Encounter #  
 11/8/2017 12:15 PM MD [facility] [id number] Follow-up Instructions Return in about 4 months (around 3/8/2018). Your Appointments 2/20/2018  3:00 PM  
Follow Up with Giovanni Staples MD  
[facility] ([facility]) Appt Note: 6 mth f/up [place] [address] [id number] [phone] [address] [place] [address] [address] Upcoming Health Maintenance Date Due DTaP/Tdap/Td series (1 - Tdap) 12/27/1965 BREAST CANCER SCRN MAMMOGRAM 12/27/1994 FOBT Q 1 YEAR AGE 50-75 12/27/1994 ZOSTER VACCINE AGE 60> 10/27/2004 GLAUCOMA SCREENING Q2Y 12/27/2009 Pneumococcal 65+ Low/Medium Risk (1 of 2 - PCV13) 12/27/2009 MEDICARE YEARLY EXAM 12/27/2009 Influenza Age 5 to Adult 11/15/2017* *Topic was postponed. The date shown is not the original due date. Allergies as of 11/8/2017  Review Complete On: 11/8/2017 By: Debora Zheng MD  
  
 Severity Noted Reaction Type Reactions Lisinopril High 07/23/2010    Rash Hypertensive crisis Tetracycline High 08/08/2012    Anaphylaxis, Rash, Swelling Hibiclens [Chlorhexidine Gluconate]  03/06/2017    Other (comments) Turned red and BP drop low. Other Medication  07/23/2010    Rash, Swelling  
 -Myacins Current Immunizations  Reviewed on 11/8/2017 Name Date Pneumococcal Polysaccharide (PPSV-23) 11/8/2016 Reviewed by Debora Zheng MD on 11/8/2017 at 12:49 PM  
Vitals BP Pulse Temp Resp Height(growth percentile) Weight(growth percentile) 102/64 (BP 1 Location: Left arm, BP Patient Position: Sitting) 60 98.3 °F (36.8 °C) (Oral) 16 5' 5\" (1.651 m) 220 lb (99.8 kg) SpO2 BMI OB Status Smoking Status 95% 36.61 kg/m2 Postmenopausal Former Smoker Vitals History BMI and BSA Data Body Mass Index Body Surface Area  
 36.61 kg/m 2 2.14 m 2 Preferred Pharmacy Pharmacy Name Phone [facility] [address] [place] [address] Rd [phone] Your Updated Medication List  
  
   
This list is accurate as of: 11/8/17 12:57 PM.  Always use your most recent med list.  
  
  
  
  
 albuterol 90 mcg/actuation inhaler Commonly known as:  PROVENTIL HFA, VENTOLIN HFA, PROAIR HFA Take 1 Puff by inhalation every six (6) hours as needed for Wheezing. atorvastatin 40 mg tablet Commonly known as:  LIPITOR Take 1 Tab by mouth daily. CENTRUM SILVER WOMEN PO Take  by mouth. ELIQUIS 5 mg tablet Generic drug:  apixaban Take 1 Tab by mouth two (2) times a day. escitalopram oxalate 20 mg tablet Commonly known as:  Cherseven Jews Take 20 mg by mouth daily. exemestane 25 mg tablet Commonly known as:  Iris Row Take 25 mg by mouth daily. metFORMIN  mg tablet Commonly known as:  GLUCOPHAGE XR Take 500 mg by mouth daily (with dinner). metoprolol tartrate 100 mg IR tablet Commonly known as:  LOPRESSOR  
TAKE ONE TABLET BY MOUTH TWICE DAILY PRAVACHOL 80 mg tablet Generic drug:  pravastatin Take 80 mg by mouth nightly. Follow-up Instructions Return in about 4 months (around 3/8/2018). Patient Instructions Bronchiectasis: Care Instructions Your Care Instructions Bronchiectasis (say \"lgagt-oec-ADV-tuh-heraclio\") is a lung problem in which the breathing tubes are stretched and become larger. The buildup of mucus causes the stretching and can lead to swelling and infections. You may find it harder to breathe and cough up mucus out of your lungs. Some people are born with it. Other people get it because of another health problem, usually cystic fibrosis or a lung infection such as pneumonia or tuberculosis. Symptoms are often different for everyone. But a cough that brings up mucus, or sputum, is common. The condition is usually treated with antibiotics, medicines to relax the airways (bronchodilators), and medicines to make it easier to cough up mucus (expectorants). Follow-up care is a key part of your treatment and safety. Be sure to make and go to all appointments, and call your doctor if you are having problems. It's also a good idea to know your test results and keep a list of the medicines you take. How can you care for yourself at home? · Take your antibiotics as directed. Do not stop taking them just because you feel better. You need to take the full course of antibiotics. · Take your medicines exactly as prescribed. Call your doctor if you think you are having a problem with your medicine. · If you have cystic fibrosis, follow your treatment plan. · If you or your child has bronchiectasis, follow directions from your doctor or respiratory therapist for moving your or your child's body into different positions to help drain fluid. This is called postural drainage, and it helps to ease breathing and prevent infections. · You also may do chest percussion on your child. This is strong clapping of the chest with a cupped hand to vibrate the airways in the lungs. The vibration helps your child cough up mucus. You may see a respiratory therapist to learn how to do chest percussion. · Use an airway clearance device, such as a flutter valve, as directed to help remove mucus from the lungs. · Avoid lung infections. ¨ Get shots to protect against the flu and pneumococcal disease. ¨ Wash your hands frequently. ¨ Avoid close contact with people who have colds or the flu. ¨ Do not smoke or allow others to smoke around you. If you need help quitting, talk to your doctor about stop-smoking programs and medicines. These can increase your chances of quitting for good. ¨ Stay inside, if you can, on days when the pollution level is high. When should you call for help? Call 911 anytime you think you may need emergency care. For example, call if: 
? · You have severe trouble breathing. ? · You cough up more than 1 cup of blood. ?Call your doctor now or seek immediate medical care if: 
? · You have chest pain. ? · You have shortness of breath. ? · You have a fever. ? · Your mucus (sputum) is bloody or changes color. ? Watch closely for changes in your health, and be sure to contact your doctor if: 
? · You are coughing up more sputum than before. ? · Your symptoms get worse or do not get better with treatment. Where can you learn more? Go to http://josr-candido.info/. Enter C667 in the search box to learn more about \"Bronchiectasis: Care Instructions. \" Current as of: May 12, 2017 Content Version: 11.4 © 8658-7351 [employer]. Care instructions adapted under license by [employer] (which disclaims liability or warranty for this information). If you have questions about a medical condition or this instruction, always ask your healthcare professional. [address] any warranty or liability for your use of this information. Introducing hospitals & HEALTH SERVICES! Ion Nava introduces [employer] patient portal. Now you can access parts of your medical record, email your doctor's office, and request medication refills online. 1. In your internet browser, go to [email or web address] 2. Click on the First Time User? Click Here link in the Sign In box. You will see the New Member Sign Up page. 3. Enter your Sc[employer]on Access Code exactly as it appears below. You will not need to use this code after youve completed the sign-up process. If you do not sign up before the expiration date, you must request a new code. · [employer] Access Code: [email or web address] Expires: 2/6/2018 12:57 PM 
 
 4. Enter the last four digits of your Social Security Number (xxxx) and Date of Birth (mm/dd/yyyy) as indicated and click Submit. You will be taken to the next sign-up page. 5. Create a [employer] ID. This will be your [employer] login ID and cannot be changed, so think of one that is secure and easy to remember. 6. Create a [employer] password. You can change your password at any time. 7. Enter your Password Reset Question and Answer. This can be used at a later time if you forget your password. 8. Enter your e-mail address. You will receive e-mail notification when new information is available in [address] 9. Click Sign Up. You can now view and download portions of your medical record. 10. Click the Download Summary menu link to download a portable copy of your medical information. If you have questions, please visit the Frequently Asked Questions section of the [employer] website. Remember, [employer] is NOT to be used for urgent needs. For medical emergencies, dial 911. Now available from your iPhone and Android! Please provide this summary of care documentation to your next provider. Your primary care clinician is listed as Zack Mejia. If you have any questions after today's visit, please call [phone].

## 2017-11-08 NOTE — COMMUNICATION BODY
Progress Note:Follow up Visit    11/08/17     Patient feels at baseline. Uses Albuterol PRN prior to season changes/weather changes and activity. Has had 3 episodes of increased sputum needing antibiotics  Denies increase in cough or productive expectoration at present. Denies chest pain. C/o RAMOS which limits her ADL's- usually when she is rushing  Cannot walk distance/ climb stairs. Not able to exercise due to SOB. Has Oxygen at home- \"not using as she should\"       HPI: Ms. Noemi Kelly who carries history of COPD, recurrent bronchitis requiring visit to Urgent care, Diagnosis of PE on NOAC , Cardiomyopathy S/P pacemaker and defibrillator, and history of left breast cancer is evaluated for COPD/Chronic bronchitis work up. Last CT performed in MAY 2016 demonstrated PE, nonspecific mediastinal adenopathy, minimal atelectasis/small effusion, and hyperinflation. We have obtained follow up CT chest performed on 09/12/16 did not reveal any finding concerning PE or Mediastinal adenopathy. . During this interval time patient has not noticed any symptoms concerning COPD exacerbation. Her  RAMOS remains stable. 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 Subsequent follow ups- never has had TB. Allergies   Allergen Reactions    Lisinopril Rash     Hypertensive crisis    Tetracycline Anaphylaxis, Rash and Swelling    Hibiclens [Chlorhexidine Gluconate] Other (comments)     Turned red and BP drop low.  Other Medication Rash and Swelling     -Myacins     Current Outpatient Prescriptions   Medication Sig Dispense Refill    atorvastatin (LIPITOR) 40 mg tablet Take 1 Tab by mouth daily. 90 Tab 3    MULTIVIT-MIN/IRON/FOLIC/LUTEIN (CENTRUM SILVER WOMEN PO) Take  by mouth.  albuterol (PROVENTIL HFA, VENTOLIN HFA, PROAIR HFA) 90 mcg/actuation inhaler Take 1 Puff by inhalation every six (6) hours as needed for Wheezing. 1 Inhaler 4    ELIQUIS 5 mg tablet Take 1 Tab by mouth two (2) times a day.  120 Tab 3    escitalopram oxalate (LEXAPRO) 20 mg tablet Take 20 mg by mouth daily.  metFORMIN ER (GLUCOPHAGE XR) 500 mg tablet Take 500 mg by mouth daily (with dinner).  metoprolol tartrate (LOPRESSOR) 100 mg IR tablet TAKE ONE TABLET BY MOUTH TWICE DAILY 180 Tab 3    exemestane (AROMASIN) 25 mg tablet Take 25 mg by mouth daily.  pravastatin (PRAVACHOL) 80 mg tablet Take 80 mg by mouth nightly. Review of Systems   Constitutional: Negative. HENT: Negative. Eyes: Negative. Respiratory: cough, SOB  Cardiovascular: Negative. Gastrointestinal: Negative. Genitourinary: Negative. Musculoskeletal: Negative. Skin: Negative. Neurological: Negative. Endo/Heme/Allergies: Negative. Psychiatric/Behavioral: depression      Blood pressure 102/64, pulse 60, temperature 98.3 °F (36.8 °C), temperature source Oral, resp. rate 16, height 5' 5\" (1.651 m), weight 99.8 kg (220 lb), SpO2 95 %. Physical Exam   Constitutional: She is oriented to person, place, and time and well-developed, well-nourished, and in no distress. HENT:   Head: Normocephalic and atraumatic. Eyes: EOM are normal. Pupils are equal, round, and reactive to light. Neck: Normal range of motion. Neck supple. No thyromegaly present. Cardiovascular: Normal rate and regular rhythm. Pulmonary/Chest: Effort normal. She has no wheezes. She has no rales. Abdominal: Soft. Bowel sounds are normal.   Musculoskeletal: Normal range of motion. She exhibits no edema. Neurological: She is alert and oriented to person, place, and time. Skin: Skin is warm and dry. Psychiatric: Affect and judgment normal.       Investigation:      Results from Hospital Encounter encounter on 09/12/16   CTA CHEST W WO CONT   Narrative CT chest with contrast for PE    CPT CODE: 39544     HISTORY: COPD, mediastinal adenopathy. On anticoagulant therapy    COMPARISON: 5/10/16.     TECHNIQUE: Dynamic spiral scan through the chest is obtained from the thoracic  inlet to the diaphragm after dynamic nonionic IV contrast administration  per PE  protocol. Coronal and sagittal MIP computer reconstructions are also obtained  for better visualization of the integrity of pulmonary arteries in 3D dimension,  particularly for lobar/interlobar arterial branches and to minimize radiation  dose. All CT scans at this facility performed using dose optimization techniques as  appreciated to a performed exam, to include automated exposure control,  adjustment of the mA and or KU according to patient size (including appropriate  matching for site specific examination), or use of iterative reconstruction  technique. CONTRAST: 100 cc Isovue 370. FINDINGS:    PULMONARY ARTERIES: The contrast bolus is adequate. The right and left mainstem  pulmonary arteries and their branches appear patent without convincing evidence  of intraluminal filling defect identified to suggest pulmonary embolism. Multiple intraluminal clots seen in right segmental/subsegmental branches seen  on prior and no longer evident today. There is again borderline prominence of  main pulmonary arteries. AORTA AND THE GREAT VESSELS: Unremarkable. LUNG PARENCHYMA: Chronic peripheral atelectasis at the lateral left lower lobe  again noted. Mild COPD and bronchiectasis. No acute pulmonary infarction or  infiltration seen. IMAGED THYROID: Unremarkable. MEDIASTINUM: No adenopathy. HEART: The heart and the pericardium appear unremarkable. PLEURAL SPACES AND CHEST WALL: The small right pleural effusion has been  interval resolved. . The ovoid and fluid attenuation lesion in posterior left  breast is again identified with no significant interval change in size and  appearance. It measures 2.7 x 4.0 cm. VISUALIZED UPPER ABDOMEN: Unremarkable. OSSEOUS STRUCTURES: Unremarkable. Impression IMPRESSION:    1. No convincing CT evidence of pulmonary embolism.  Interval resolution of  multifocal right sided PE seen on prior CT. 2.  No acute pulmonary finding. Mild COPD and bronchiectasis. 3. Interval resolution of small right pleural effusion. 4. Stable fluid attenuation lesion in posterior left breast, probably post  procedure seroma? Ultrasound follow-up advised. Thank you for your referral.            CT chest (05/10/16): There are filling defects in the upper, middle, and lower lobe branches of the right pulmonary artery. No evidence of embolus in the left pulmonary artery branches or in the central trunks. The main pulmonary artery measures 3.3 cm in diameter. The thoracic aorta is not aneurysmal.  No evidence for dissection.      Lymph nodes: Mediastinal lymph nodes are mildly prominent. A right paratracheal lymph node measures 1.2 x 0.9 cm. Previously, this lymph node measured 1.8 x 0.4 cm. There is no evidence of hilar lymphadenopathy.      Thyroid: Normal in size without mass in the visualized parenchyma. .      Mediastinum: There is a pacemaker in the left chest wall. A fluid collection in the inferior left breast measures about 3.3 x 4.8 cm (previously, 2.8 x 4.4 cm). There is no evidence of peripheral enhancement. 2 adjacent biopsy clips are redemonstrated. There is a small hiatal hernia.      Lungs:    The lungs are diffusely hyperinflated with mild tracheobronchomalacia. Right Lung: There is a small posterior effusion with overlying atelectasis. No confluent infiltrate to suggest infarct.      Left Lung:  No evidence of infiltrate. There is a small focus of atelectasis in the posterior lingula. This is similar to previous exam. No pleural effusion.      Abdomen: There is diffuse fatty atrophy of the pancreas. Visualized portions of the liver, spleen, pancreas, adrenal glands, and kidneys are unremarkable. The gallbladder is normal.      Bones: A fracture deformity in the left anterior fifth rib is unchanged. Degenerative changes of the spine are stable.  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 There are no destructive lesions.      IMPRESSION:      1.  Multiple right pulmonary emboli. No evidence of left pulmonary emboli or pulmonary infarcts. .   2.  Small right pleural effusion. 3. Prominence of the main portal artery is suggestive of pulmonary artery hypertension. 4. COPD and mild tracheobronchomalacia. 5. Similar size of seroma in the left chest wall.    .          PFT(2008): mild obstructive changes   DATE 4/19/2017  Pre bronchodilator ( 2008) Post Bronchodilator (2008)   FVC 2. 11( 69)  2.12(67) 2.13(67)   FEV1 1.60( 69)  1.69(73) 1.64(71)   FEV1/FVC 76  79(73) 76   TLC 4.04(78)  4.32(84)     RV 2.14(79)  2.12(107)     RV/TLC   49(38)     DLCO 13.76( 61)  12.47(66)        PFT 4/19/17:  Mild to moderate restrictive impairment with reduced DLCO. Assessment:    Bronchiectasis: Recurrent bronchitis/COPD exacerbation with Mild-Moderate Restrictive impairment ? sequelae of prior inflammatory process. recurrant bronchitis- lower respiratory tract infections treated with antibiotics  History of COPD   Nonspecific Mediastinal adenopathy- S/P follow up CT chest demonstrated interval resolution  9/2016 CT. PE on NOAC ,5/206. Resolved on follow up CT in 9/2016  Atrial fibrillation  H/o breast biopsy- 2010. H/o radiation   Depression        Plan:  Action plan for acute exacerbations discussed  Continue Albuterol two puffs, every six hours as needed   Continue ELIQUIS as prescribed . Explained need and addressed concerns about drug- drug interactions  Oxygen requirement. - desaturates and corrects with 3L from 87% on room air to 94% with 3L O2. Has O2 at home- not using as much  Encouraged active lifestyle and endurance activities with oxygen supplementation. Preventive vaccinations- Will get Flu vaccine with PCP.  Will check which Pneumococcal vaccine she got with PCP last year and if she needs Prevnar 13  RTC in 4  months time        Daniela Michael  Pulmonary and Critical Care Medicine

## 2017-11-08 NOTE — PROGRESS NOTES
Progress Note:Follow up Visit    11/08/17     Patient feels at baseline. Uses Albuterol PRN prior to season changes/weather changes and activity. Has had 3 episodes of increased sputum needing antibiotics  Denies increase in cough or productive expectoration at present. Denies chest pain. C/o RAMOS which limits her ADL's- usually when she is rushing  Cannot walk distance/ climb stairs. Not able to exercise due to SOB. Has Oxygen at home- \"not using as she should\"       HPI: Ms. Marianna Victor who carries history of COPD, recurrent bronchitis requiring visit to Urgent care, Diagnosis of PE on NOAC , Cardiomyopathy S/P pacemaker and defibrillator, and history of left breast cancer is evaluated for COPD/Chronic bronchitis work up. Last CT performed in MAY 2016 demonstrated PE, nonspecific mediastinal adenopathy, minimal atelectasis/small effusion, and hyperinflation. We have obtained follow up CT chest performed on 09/12/16 did not reveal any finding concerning PE or Mediastinal adenopathy. . During this interval time patient has not noticed any symptoms concerning COPD exacerbation. Her  RAMOS remains stable. 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 Subsequent follow ups- never has had TB. Allergies   Allergen Reactions    Lisinopril Rash     Hypertensive crisis    Tetracycline Anaphylaxis, Rash and Swelling    Hibiclens [Chlorhexidine Gluconate] Other (comments)     Turned red and BP drop low.  Other Medication Rash and Swelling     -Myacins     Current Outpatient Prescriptions   Medication Sig Dispense Refill    atorvastatin (LIPITOR) 40 mg tablet Take 1 Tab by mouth daily. 90 Tab 3    MULTIVIT-MIN/IRON/FOLIC/LUTEIN (CENTRUM SILVER WOMEN PO) Take  by mouth.  albuterol (PROVENTIL HFA, VENTOLIN HFA, PROAIR HFA) 90 mcg/actuation inhaler Take 1 Puff by inhalation every six (6) hours as needed for Wheezing. 1 Inhaler 4    ELIQUIS 5 mg tablet Take 1 Tab by mouth two (2) times a day.  120 Tab 3    escitalopram oxalate (LEXAPRO) 20 mg tablet Take 20 mg by mouth daily.  metFORMIN ER (GLUCOPHAGE XR) 500 mg tablet Take 500 mg by mouth daily (with dinner).  metoprolol tartrate (LOPRESSOR) 100 mg IR tablet TAKE ONE TABLET BY MOUTH TWICE DAILY 180 Tab 3    exemestane (AROMASIN) 25 mg tablet Take 25 mg by mouth daily.  pravastatin (PRAVACHOL) 80 mg tablet Take 80 mg by mouth nightly. Review of Systems   Constitutional: Negative. HENT: Negative. Eyes: Negative. Respiratory: cough, SOB  Cardiovascular: Negative. Gastrointestinal: Negative. Genitourinary: Negative. Musculoskeletal: Negative. Skin: Negative. Neurological: Negative. Endo/Heme/Allergies: Negative. Psychiatric/Behavioral: depression      Blood pressure 102/64, pulse 60, temperature 98.3 °F (36.8 °C), temperature source Oral, resp. rate 16, height 5' 5\" (1.651 m), weight 99.8 kg (220 lb), SpO2 95 %. Physical Exam   Constitutional: She is oriented to person, place, and time and well-developed, well-nourished, and in no distress. HENT:   Head: Normocephalic and atraumatic. Eyes: EOM are normal. Pupils are equal, round, and reactive to light. Neck: Normal range of motion. Neck supple. No thyromegaly present. Cardiovascular: Normal rate and regular rhythm. Pulmonary/Chest: Effort normal. She has no wheezes. She has no rales. Abdominal: Soft. Bowel sounds are normal.   Musculoskeletal: Normal range of motion. She exhibits no edema. Neurological: She is alert and oriented to person, place, and time. Skin: Skin is warm and dry. Psychiatric: Affect and judgment normal.       Investigation:      Results from Hospital Encounter encounter on 09/12/16   CTA CHEST W WO CONT   Narrative CT chest with contrast for PE    CPT CODE: 33621     HISTORY: COPD, mediastinal adenopathy. On anticoagulant therapy    COMPARISON: 5/10/16.     TECHNIQUE: Dynamic spiral scan through the chest is obtained from the thoracic  inlet to the diaphragm after dynamic nonionic IV contrast administration  per PE  protocol. Coronal and sagittal MIP computer reconstructions are also obtained  for better visualization of the integrity of pulmonary arteries in 3D dimension,  particularly for lobar/interlobar arterial branches and to minimize radiation  dose. All CT scans at this facility performed using dose optimization techniques as  appreciated to a performed exam, to include automated exposure control,  adjustment of the mA and or KU according to patient size (including appropriate  matching for site specific examination), or use of iterative reconstruction  technique. CONTRAST: 100 cc Isovue 370. FINDINGS:    PULMONARY ARTERIES: The contrast bolus is adequate. The right and left mainstem  pulmonary arteries and their branches appear patent without convincing evidence  of intraluminal filling defect identified to suggest pulmonary embolism. Multiple intraluminal clots seen in right segmental/subsegmental branches seen  on prior and no longer evident today. There is again borderline prominence of  main pulmonary arteries. AORTA AND THE GREAT VESSELS: Unremarkable. LUNG PARENCHYMA: Chronic peripheral atelectasis at the lateral left lower lobe  again noted. Mild COPD and bronchiectasis. No acute pulmonary infarction or  infiltration seen. IMAGED THYROID: Unremarkable. MEDIASTINUM: No adenopathy. HEART: The heart and the pericardium appear unremarkable. PLEURAL SPACES AND CHEST WALL: The small right pleural effusion has been  interval resolved. . The ovoid and fluid attenuation lesion in posterior left  breast is again identified with no significant interval change in size and  appearance. It measures 2.7 x 4.0 cm. VISUALIZED UPPER ABDOMEN: Unremarkable. OSSEOUS STRUCTURES: Unremarkable. Impression IMPRESSION:    1. No convincing CT evidence of pulmonary embolism.  Interval resolution of  multifocal right sided PE seen on prior CT. 2.  No acute pulmonary finding. Mild COPD and bronchiectasis. 3. Interval resolution of small right pleural effusion. 4. Stable fluid attenuation lesion in posterior left breast, probably post  procedure seroma? Ultrasound follow-up advised. Thank you for your referral.            CT chest (05/10/16): There are filling defects in the upper, middle, and lower lobe branches of the right pulmonary artery. No evidence of embolus in the left pulmonary artery branches or in the central trunks. The main pulmonary artery measures 3.3 cm in diameter. The thoracic aorta is not aneurysmal.  No evidence for dissection.      Lymph nodes: Mediastinal lymph nodes are mildly prominent. A right paratracheal lymph node measures 1.2 x 0.9 cm. Previously, this lymph node measured 1.8 x 0.4 cm. There is no evidence of hilar lymphadenopathy.      Thyroid: Normal in size without mass in the visualized parenchyma. .      Mediastinum: There is a pacemaker in the left chest wall. A fluid collection in the inferior left breast measures about 3.3 x 4.8 cm (previously, 2.8 x 4.4 cm). There is no evidence of peripheral enhancement. 2 adjacent biopsy clips are redemonstrated. There is a small hiatal hernia.      Lungs:    The lungs are diffusely hyperinflated with mild tracheobronchomalacia. Right Lung: There is a small posterior effusion with overlying atelectasis. No confluent infiltrate to suggest infarct.      Left Lung:  No evidence of infiltrate. There is a small focus of atelectasis in the posterior lingula. This is similar to previous exam. No pleural effusion.      Abdomen: There is diffuse fatty atrophy of the pancreas. Visualized portions of the liver, spleen, pancreas, adrenal glands, and kidneys are unremarkable. The gallbladder is normal.      Bones: A fracture deformity in the left anterior fifth rib is unchanged. Degenerative changes of the spine are stable.  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 There are no destructive lesions.      IMPRESSION:      1.  Multiple right pulmonary emboli. No evidence of left pulmonary emboli or pulmonary infarcts. .   2.  Small right pleural effusion. 3. Prominence of the main portal artery is suggestive of pulmonary artery hypertension. 4. COPD and mild tracheobronchomalacia. 5. Similar size of seroma in the left chest wall.    .          PFT(2008): mild obstructive changes   DATE 4/19/2017  Pre bronchodilator ( 2008) Post Bronchodilator (2008)   FVC 2. 11( 69)  2.12(67) 2.13(67)   FEV1 1.60( 69)  1.69(73) 1.64(71)   FEV1/FVC 76  79(73) 76   TLC 4.04(78)  4.32(84)     RV 2.14(79)  2.12(107)     RV/TLC   49(38)     DLCO 13.76( 61)  12.47(66)        PFT 4/19/17:  Mild to moderate restrictive impairment with reduced DLCO. Assessment:    Bronchiectasis: Recurrent bronchitis/COPD exacerbation with Mild-Moderate Restrictive impairment ? sequelae of prior inflammatory process. recurrant bronchitis- lower respiratory tract infections treated with antibiotics  History of COPD   Nonspecific Mediastinal adenopathy- S/P follow up CT chest demonstrated interval resolution  9/2016 CT. PE on NOAC ,5/206. Resolved on follow up CT in 9/2016  Atrial fibrillation  H/o breast biopsy- 2010. H/o radiation   Depression        Plan:  Action plan for acute exacerbations discussed  Continue Albuterol two puffs, every six hours as needed   Continue ELIQUIS as prescribed . Explained need and addressed concerns about drug- drug interactions  Oxygen requirement. - desaturates and corrects with 3L from 87% on room air to 94% with 3L O2. Has O2 at home- not using as much  Encouraged active lifestyle and endurance activities with oxygen supplementation. Preventive vaccinations- Will get Flu vaccine with PCP.  Will check which Pneumococcal vaccine she got with PCP last year and if she needs Prevnar 13  RTC in 4  months time        Daniela Michael  Pulmonary and Critical Care Medicine

## 2017-11-08 NOTE — LETTER
11/8/2017 1:03 PM 
 
Patient:  Omar Ruano YOB: 1945 Date of Visit: 11/8/2017 Dear Giselle Vital MD 
[address] [place] [id number] VIA Facsimile: [phone] 
 : Thank you for referring Ms. Tish Chun to me for evaluation/treatment. Below are the relevant portions of my assessment and plan of care. Progress Note:Follow up Visit 11/08/17 Patient feels at baseline. Uses Albuterol PRN prior to season changes/weather changes and activity. Has had 3 episodes of increased sputum needing antibiotics Denies increase in cough or productive expectoration at present. Denies chest pain. C/o RAMOS which limits her ADL's- usually when she is rushing Cannot walk distance/ climb stairs. Not able to exercise due to SOB. Has Oxygen at home- \"not using as she should\" 
 
  
HPI: Ms. Shirlene Cam who carries history of COPD, recurrent bronchitis requiring visit to Urgent care, Diagnosis of PE on NOAC , Cardiomyopathy S/P pacemaker and defibrillator, and history of left breast cancer is evaluated for COPD/Chronic bronchitis work up. Last CT performed in MAY 2016 demonstrated PE, nonspecific mediastinal adenopathy, minimal atelectasis/small effusion, and hyperinflation. We have obtained follow up CT chest performed on 09/12/16 did not reveal any finding concerning PE or Mediastinal adenopathy. . During this interval time patient has not noticed any symptoms concerning COPD exacerbation. Her  RAMOS remains stable. 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 Subsequent follow ups- never has had TB. Allergies Allergen Reactions  Lisinopril Rash Hypertensive crisis  Tetracycline Anaphylaxis, Rash and Swelling  Hibiclens [Chlorhexidine Gluconate] Other (comments) Turned red and BP drop low.  Other Medication Rash and Swelling  
  -Myacins Current Outpatient Prescriptions Medication Sig Dispense Refill  atorvastatin (LIPITOR) 40 mg tablet Take 1 Tab by mouth daily. 90 Tab 3  MULTIVIT-MIN/IRON/FOLIC/LUTEIN (CENTRUM SILVER WOMEN PO) Take  by mouth.  albuterol (PROVENTIL HFA, VENTOLIN HFA, PROAIR HFA) 90 mcg/actuation inhaler Take 1 Puff by inhalation every six (6) hours as needed for Wheezing. 1 Inhaler 4  
 ELIQUIS 5 mg tablet Take 1 Tab by mouth two (2) times a day. 120 Tab 3  
 escitalopram oxalate (LEXAPRO) 20 mg tablet Take 20 mg by mouth daily.  metFORMIN ER (GLUCOPHAGE XR) 500 mg tablet Take 500 mg by mouth daily (with dinner).  metoprolol tartrate (LOPRESSOR) 100 mg IR tablet TAKE ONE TABLET BY MOUTH TWICE DAILY 180 Tab 3  
 exemestane (AROMASIN) 25 mg tablet Take 25 mg by mouth daily.  pravastatin (PRAVACHOL) 80 mg tablet Take 80 mg by mouth nightly. Review of Systems Constitutional: Negative. HENT: Negative. Eyes: Negative. Respiratory: cough, SOB Cardiovascular: Negative. Gastrointestinal: Negative. Genitourinary: Negative. Musculoskeletal: Negative. Skin: Negative. Neurological: Negative. Endo/Heme/Allergies: Negative. Psychiatric/Behavioral: depression Blood pressure 102/64, pulse 60, temperature 98.3 °F (36.8 °C), temperature source Oral, resp. rate 16, height 5' 5\" (1.651 m), weight 99.8 kg (220 lb), SpO2 95 %. Physical Exam  
Constitutional: She is oriented to person, place, and time and well-developed, well-nourished, and in no distress. HENT:  
Head: Normocephalic and atraumatic. Eyes: EOM are normal. Pupils are equal, round, and reactive to light. Neck: Normal range of motion. Neck supple. No thyromegaly present. Cardiovascular: Normal rate and regular rhythm. Pulmonary/Chest: Effort normal. She has no wheezes. She has no rales. Abdominal: Soft. Bowel sounds are normal.  
Musculoskeletal: Normal range of motion. She exhibits no edema. Neurological: She is alert and oriented to person, place, and time. Skin: Skin is warm and dry. Psychiatric: Affect and judgment normal.  
 
 
Investigation: 
 
 
Results from Hospital Encounter encounter on 09/12/16 CTA CHEST W WO CONT Narrative CT chest with contrast for PE 
 
CPT CODE: 92305 HISTORY: COPD, mediastinal adenopathy. On anticoagulant therapy COMPARISON: 5/10/16. TECHNIQUE: Dynamic spiral scan through the chest is obtained from the thoracic 
inlet to the diaphragm after dynamic nonionic IV contrast administration  per PE 
protocol. Coronal and sagittal MIP computer reconstructions are also obtained 
for better visualization of the integrity of pulmonary arteries in 3D dimension, 
particularly for lobar/interlobar arterial branches and to minimize radiation 
dose. All CT scans at this facility performed using dose optimization techniques as 
appreciated to a performed exam, to include automated exposure control, 
adjustment of the mA and or KU according to patient size (including appropriate 
matching for site specific examination), or use of iterative reconstruction 
technique. CONTRAST: 100 cc Isovue 370. FINDINGS: 
 
PULMONARY ARTERIES: The contrast bolus is adequate. The right and left mainstem 
pulmonary arteries and their branches appear patent without convincing evidence 
of intraluminal filling defect identified to suggest pulmonary embolism. Multiple intraluminal clots seen in right segmental/subsegmental branches seen on prior and no longer evident today. There is again borderline prominence of 
main pulmonary arteries. AORTA AND THE GREAT VESSELS: Unremarkable. LUNG PARENCHYMA: Chronic peripheral atelectasis at the lateral left lower lobe 
again noted. Mild COPD and bronchiectasis. No acute pulmonary infarction or 
infiltration seen. IMAGED THYROID: Unremarkable. MEDIASTINUM: No adenopathy. HEART: The heart and the pericardium appear unremarkable. PLEURAL SPACES AND CHEST WALL: The small right pleural effusion has been 
interval resolved. . The ovoid and fluid attenuation lesion in posterior left 
breast is again identified with no significant interval change in size and 
appearance. It measures 2.7 x 4.0 cm. VISUALIZED UPPER ABDOMEN: Unremarkable. OSSEOUS STRUCTURES: Unremarkable. Impression IMPRESSION: 
 
1. No convincing CT evidence of pulmonary embolism. Interval resolution of 
multifocal right sided PE seen on prior CT. 2.  No acute pulmonary finding. Mild COPD and bronchiectasis. 3. Interval resolution of small right pleural effusion. 4. Stable fluid attenuation lesion in posterior left breast, probably post 
procedure seroma? Ultrasound follow-up advised. Thank you for your referral. 
 
   
 
 
CT chest (05/10/16): There are filling defects in the upper, middle, and lower lobe branches of the right pulmonary artery. No evidence of embolus in the left pulmonary artery branches or in the central trunks. The main pulmonary artery measures 3.3 cm in diameter. The thoracic aorta is not aneurysmal.  No evidence for dissection. 
   
Lymph nodes: Mediastinal lymph nodes are mildly prominent. A right paratracheal lymph node measures 1.2 x 0.9 cm. Previously, this lymph node measured 1.8 x 0.4 cm. There is no evidence of hilar lymphadenopathy. 
   
Thyroid: Normal in size without mass in the visualized parenchyma. . 
   
Mediastinum: There is a pacemaker in the left chest wall. A fluid collection in the inferior left breast measures about 3.3 x 4.8 cm (previously, 2.8 x 4.4 cm). There is no evidence of peripheral enhancement. 2 adjacent biopsy clips are redemonstrated. There is a small hiatal hernia. 
   
Lungs:   
The lungs are diffusely hyperinflated with mild tracheobronchomalacia. Right Lung: There is a small posterior effusion with overlying atelectasis.  No confluent infiltrate to suggest infarct. 
   
 Left Lung:  No evidence of infiltrate. There is a small focus of atelectasis in the posterior lingula. This is similar to previous exam. No pleural effusion. 
   
Abdomen: There is diffuse fatty atrophy of the pancreas. Visualized portions of the liver, spleen, pancreas, adrenal glands, and kidneys are unremarkable. The gallbladder is normal. 
   
Bones: A fracture deformity in the left anterior fifth rib is unchanged. Degenerative changes of the spine are stable. 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 There are no destructive lesions. 
   
IMPRESSION: 
   
1.  Multiple right pulmonary emboli. No evidence of left pulmonary emboli or pulmonary infarcts. Cale Iyer 2.  Small right pleural effusion. 3. Prominence of the main portal artery is suggestive of pulmonary artery hypertension. 4. COPD and mild tracheobronchomalacia. 5. Similar size of seroma in the left chest wall. 
  .      
  
PFT(2008): mild obstructive changes DATE 4/19/2017  Pre bronchodilator ( 2008) Post Bronchodilator (2008) FVC 2. 11( 69)  2.12(67) 2.13(67) FEV1 1.60( 69)  1.69(73) 1.64(71) FEV1/FVC 76  79(73) 76 TLC 4.04(78)  4.32(84)    
RV 2.14(79)  2.12(107)    
RV/TLC   49(38)    
DLCO 13.76( 61)  12.47(66)    
  
PFT 4/19/17: 
Mild to moderate restrictive impairment with reduced DLCO. Assessment: 
 
Bronchiectasis: Recurrent bronchitis/COPD exacerbation with Mild-Moderate Restrictive impairment ? sequelae of prior inflammatory process. recurrant bronchitis- lower respiratory tract infections treated with antibiotics History of COPD Nonspecific Mediastinal adenopathy- S/P follow up CT chest demonstrated interval resolution  9/2016 CT. PE on NOAC ,5/206. Resolved on follow up CT in 9/2016 Atrial fibrillation H/o breast biopsy- 2010. H/o radiation Depression 
  
  
Plan: 
Action plan for acute exacerbations discussed Continue Albuterol two puffs, every six hours as needed Continue ELIQUIS as prescribed . Explained need and addressed concerns about drug- drug interactions Oxygen requirement. - desaturates and corrects with 3L from 87% on room air to 94% with 3L O2. Has O2 at home- not using as much Encouraged active lifestyle and endurance activities with oxygen supplementation. Preventive vaccinations- Will get Flu vaccine with PCP. Will check which Pneumococcal vaccine she got with PCP last year and if she needs Prevnar 13 RTC in 4  months time  
  
 
[facility] Critical [facility]  
  
 
 
 
 
If you have questions, please do not hesitate to call me. I look forward to following Ms. Cliff Castro along with you.  
 
 
 
Sincerely, 
 
 
Yanely Mendenhall MD

## 2017-11-08 NOTE — PATIENT INSTRUCTIONS
Bronchiectasis: Care Instructions  Your Care Instructions  Bronchiectasis (say \"viukr-[id number]-QEP-tuh-heraclio\") is a lung problem in which the breathing tubes are stretched and become larger. The buildup of mucus causes the stretching and can lead to swelling and infections. You may find it harder to breathe and cough up mucus out of your lungs. Some people are born with it. Other people get it because of another health problem, usually cystic fibrosis or a lung infection such as pneumonia or tuberculosis. Symptoms are often different for everyone. But a cough that brings up mucus, or sputum, is common. The condition is usually treated with antibiotics, medicines to relax the airways (bronchodilators), and medicines to make it easier to cough up mucus (expectorants). Follow-up care is a key part of your treatment and safety. Be sure to make and go to all appointments, and call your doctor if you are having problems. It's also a good idea to know your test results and keep a list of the medicines you take. How can you care for yourself at home? · Take your antibiotics as directed. Do not stop taking them just because you feel better. You need to take the full course of antibiotics. · Take your medicines exactly as prescribed. Call your doctor if you think you are having a problem with your medicine. · If you have cystic fibrosis, follow your treatment plan. · If you or your child has bronchiectasis, follow directions from your doctor or respiratory therapist for moving your or your child's body into different positions to help drain fluid. This is called postural drainage, and it helps to ease breathing and prevent infections. · You also may do chest percussion on your child. This is strong clapping of the chest with a cupped hand to vibrate the airways in the lungs. The vibration helps your child cough up mucus. You may see a respiratory therapist to learn how to do chest percussion.   · Use an airway clearance device, such as a flutter valve, as directed to help remove mucus from the lungs. · Avoid lung infections. ¨ Get shots to protect against the flu and pneumococcal disease. ¨ Wash your hands frequently. ¨ Avoid close contact with people who have colds or the flu. ¨ Do not smoke or allow others to smoke around you. If you need help quitting, talk to your doctor about stop-smoking programs and medicines. These can increase your chances of quitting for good. ¨ Stay inside, if you can, on days when the pollution level is high. When should you call for help? Call 911 anytime you think you may need emergency care. For example, call if:  ? · You have severe trouble breathing. ? · You cough up more than 1 cup of blood. ?Call your doctor now or seek immediate medical care if:  ? · You have chest pain. ? · You have shortness of breath. ? · You have a fever. ? · Your mucus (sputum) is bloody or changes color. ? Watch closely for changes in your health, and be sure to contact your doctor if:  ? · You are coughing up more sputum than before. ? · Your symptoms get worse or do not get better with treatment. Where can you learn more? Go to http://josr-candido.info/. Enter C667 in the search box to learn more about \"Bronchiectasis: Care Instructions. \"  Current as of: May 12, 2017  Content Version: 11.4  © 6117-3141 [employer]. Care instructions adapted under license by [employer] (which disclaims liability or warranty for this information). If you have questions about a medical condition or this instruction, always ask your healthcare professional. [place] [address] any warranty or liability for your use of this information.

## 2018-01-03 ENCOUNTER — HOSPITAL ENCOUNTER (OUTPATIENT)
Dept: GENERAL RADIOLOGY | Age: 73
Discharge: HOME | End: 2018-01-03
Payer: MEDICARE

## 2018-01-03 DIAGNOSIS — J06.9 UPPER RESPIRATORY INFECTION: ICD-10-CM

## 2018-01-03 DIAGNOSIS — M25.511 RIGHT SHOULDER PAIN: ICD-10-CM

## 2018-01-03 PROCEDURE — 71046 X-RAY EXAM CHEST 2 VIEWS: CPT

## 2018-01-03 PROCEDURE — 73030 X-RAY EXAM OF SHOULDER: CPT

## 2018-02-09 RX ORDER — METOPROLOL TARTRATE 100 MG/1
TABLET ORAL
Qty: 180 TAB | Refills: 3 | Status: SHIPPED | OUTPATIENT
Start: 2018-02-09 | End: 2019-04-04 | Stop reason: SDUPTHER

## 2018-02-20 ENCOUNTER — OFFICE VISIT (OUTPATIENT)
Dept: CARDIOLOGY CLINIC | Age: 73
End: 2018-02-20

## 2018-02-20 ENCOUNTER — CLINICAL SUPPORT (OUTPATIENT)
Dept: CARDIOLOGY CLINIC | Age: 73
End: 2018-02-20

## 2018-02-20 VITALS
HEART RATE: 60 BPM | HEIGHT: 65 IN | DIASTOLIC BLOOD PRESSURE: 80 MMHG | OXYGEN SATURATION: 97 % | SYSTOLIC BLOOD PRESSURE: 118 MMHG | BODY MASS INDEX: 35.49 KG/M2 | WEIGHT: 213 LBS

## 2018-02-20 DIAGNOSIS — I48.0 PAF (PAROXYSMAL ATRIAL FIBRILLATION) (HCC): ICD-10-CM

## 2018-02-20 DIAGNOSIS — I42.9 CARDIOMYOPATHY, IDIOPATHIC (HCC): Primary | ICD-10-CM

## 2018-02-20 DIAGNOSIS — Z95.810 BIVENTRICULAR IMPLANTABLE CARDIOVERTER-DEFIBRILLATOR IN SITU: ICD-10-CM

## 2018-02-20 NOTE — PROGRESS NOTES
HISTORY OF PRESENT ILLNESS  Gloria Alvarez is a 67 y.o. female. HPI  She has been doing quite well. She has had some atypical type resting left-sided chest pain which sounds non-cardiac in nature. She denies dyspnea, orthopnea or PND. She has had no palpitations, dizziness or syncope. She has had no symptoms to indicate TIA or amaurosis fugax. The interrogation of the ICD demonstrated normal OptiVol indicating normal volume status and no significant ventricular or artrial arrhythmia. She has history of a left bundle branch block, hypertension, dyslipidemia and glucose intolerance. She has a 20 pack a year cigarette smoking history until 1988. She was told that she had a heart murmur and hole in her heart as a child, but this has not been clearly documented by echocardiogram or other diagnostic workup. She was seen by Dr. Rupal Carlton in 2003, for the evaluation of left bundle branch block and had negative stress nuclear cardiac imaging. She was told that everything was okay by Dr. Rupal Carlton at that time. She had repeat stress nuclear cardiac imaging on September 17, 2007, which demonstrated a moderate, fixed perfusion defect in the basal inferior and mid inferior wall, involving the inferior apex as well, with no significant ischemia. There was also moderately severe scarring in the anterior wall, anterior apex and anterior septum, with very mild associated ischemia. There was an akinetic interventricular septum and anterior apex, and mild hypokinesis of the proximal anterior wall, with moderate hypokinesis of the entire inferior wall, with overall EF in the 25% range. She subsequently underwent cardiac catheterization on February 27, 2008 which demonstrated patent coronary arteries, but severe LV dysfunction with EF in the 25-30% range. She then went on to have biventricular ICD implantation on May 12, 2008. Her EF has improved to 56% by repeat echocardiogram in 2009.  She had a repeat echocardiogram on August 13, 2010, which demonstrated once again improved LV function, with EF in the 55% range. Left atrial volume was normal at 23 ml/m². PA pressure was estimated normal as well.    Because of shortness of breath she had repeat echocardiogram on 4/16/2012 with demonstrated normal LV function with the EF in the 60 to 65% range. There was grade 1 diastolic dysfunction. PA pressure was estimated in the range of 25 to 30 mmHg. There was no significant valvular pathology.    She had an ultrasound examination of the aorta, which demonstrated possible aneurysm; however, the CT scan demonstrated no evidence of aneurysm whatsoever. She does not feel the palpitations much herself; however, her ICD interrogation demonstrated frequent episodes of atrial fibrillation, at times, very prolonged for hours. She had problems with worsening shortness of breath and she developed a cough and fever, and was seen in the emergency room on 05/30/16. She was ultimately diagnosed of DVT and pulmonary emboli by CT angiogram and has been placed on Eliquis. Her echocardiogram on 07/07/2016 demonstrated normal left ventricular size and lower limit of normal left ventricular systolic function with EF in the 50% range.  There was no significant valvular pathology.  Left atrial dimension was normal with a volume index of 25 mL/meter2.  There was no significant pulmonary hypertension.       Review of Systems   Constitutional: Negative for malaise/fatigue and weight loss. HENT: Negative for hearing loss. Eyes: Negative for blurred vision and double vision. Respiratory: Positive for shortness of breath. Cardiovascular: Positive for palpitations. Negative for chest pain, orthopnea, claudication and PND. Gastrointestinal: Negative for blood in stool, heartburn and melena. Genitourinary: Negative for dysuria, frequency, hematuria and urgency. Musculoskeletal: Negative for back pain and joint pain. Skin: Negative for itching and rash. Neurological: Negative for dizziness, loss of consciousness and weakness. Psychiatric/Behavioral: Negative for depression and memory loss. Physical Exam   Constitutional: She is oriented to person, place, and time. She appears well-developed and well-nourished. HENT:   Head: Normocephalic and atraumatic. Eyes: Conjunctivae are normal. Pupils are equal, round, and reactive to light. Neck: Normal range of motion. Neck supple. No JVD present. Cardiovascular: Normal rate, regular rhythm, S1 normal and S2 normal.   No extrasystoles are present. PMI is not displaced. Exam reveals no gallop and no friction rub. Murmur heard. Harsh early systolic murmur is present with a grade of 1/6  at the upper right sternal border  Pulses:       Carotid pulses are 3+ on the right side, and 3+ on the left side. Pulmonary/Chest: Effort normal. She has no rales. Abdominal: Soft. There is no tenderness. Musculoskeletal: She exhibits no edema. Neurological: She is alert and oriented to person, place, and time. No cranial nerve deficit. Skin: Skin is warm and dry. Psychiatric: She has a normal mood and affect. Her behavior is normal.     Visit Vitals    /80    Pulse 60    Ht 5' 5\" (1.651 m)    Wt 96.6 kg (213 lb)    SpO2 97%    BMI 35.45 kg/m2       Past Medical History:   Diagnosis Date    CAD (coronary artery disease)     cardiomyopathy,CHF,,Cardiac Cath, pacemaker/defib.  Cancer [facility]) 2013    breast    Cardiac catheterization 02/27/2008    Patent coronary arteries. LVEDP 13 mmHg. EF 25-30%. Global hypk.  Cardiac echocardiogram 07/07/2016    EF 50% (prev 60-65% on study of 4/16/12). No WMA. Gr 1 DDfx. Normal RVSP.  Cardiac nuclear imaging test 05/31/2013    No evidence of ongoing ischemia or prior infarction. EF 60%. No RWMA. Mild dyssynchrony of inferior base & mid/distal septum likely c/w pacemaker. Nondiagnostic EKG on pharm stress test due to V-pacing.     Cardiovascular abdominal aorta duplex 09/23/2015    Fusiform AAA in prox & mid portions of abdom Ao.  Chronic lung disease     COPD (chronic obstructive pulmonary disease) (HCC) chronic bronchitis    Coronary artery disease Feb. 2008    Possible CAD with nonischemic dilated cardiomyopathy/EF 25%/ improved EF up to 56% by echocardiogram.    Coronary artery disease     Diabetes mellitus ([address]     GERD (gastroesophageal reflux disease)     Heart failure (HCC)     Heart murmur     Hemangioma of liver     Hx of cardiomyopathy ([address]     Hypercholesterolemia     Hypertension     Left bundle branch block     Phlebitis     PVD (peripheral vascular disease) ([address]     Renal calculi 1999    Thromboembolus ([address]     below knee in R leg       Social History     Social History    Marital status:      Spouse name: N/A    Number of children: N/A    Years of education: N/A     Occupational History    Not on file.      Social History Main Topics    Smoking status: Former Smoker     Packs/day: 1.50     Years: 20.00     Types: Cigarettes     Quit date: 2/23/1988    Smokeless tobacco: Never Used    Alcohol use No    Drug use: No    Sexual activity: Not on file     Other Topics Concern    Not on file     Social History Narrative       Family History   Problem Relation Age of Onset    Cancer Mother      Cancer of the breast    Stroke Mother     Cancer Father     Heart Disease Father      CHF    COPD Brother     Heart Attack Brother      Multiple    Heart Disease Brother      CHF       Past Surgical History:   Procedure Laterality Date    COLONOSCOPY N/A 3/7/2017    COLONOSCOPY, SURVEILLANCE with polypectomy performed by Pushpa Cheney MD at [address] [place] [address] HX BREAST LUMPECTOMY Left 8/2013    cancerous lesion    HX CATARACT REMOVAL Bilateral 1980s    w/ lens implants    HX CATARACT REMOVAL Left     re vised due to implant repositioning    HX HEART CATHETERIZATION  02/27/08    Patent coronary arteries, but severe LV dysfunction w/ EF in the 25-30% range.  HX KNEE ARTHROSCOPY Right     HX MASTECTOMY Left 9/24/2014    LEFT PARTIAL MASTECTOMY WITH NEEDLE LOCALIZATION MAMMOGRAM TIMES TWO performed by Lorri Butt MD at [address] [place] [address] HX PACEMAKER  May 2008    Biventricular ICD implantation     HX PACEMAKER  2012    replacement - Medtronic    HX PACEMAKER PLACEMENT      HX PELVIC LAPAROSCOPY      diagnostic - varicosities       Current Outpatient Prescriptions   Medication Sig Dispense Refill    metoprolol tartrate (LOPRESSOR) 100 mg IR tablet TAKE ONE TABLET BY MOUTH TWICE DAILY 180 Tab 3    atorvastatin (LIPITOR) 40 mg tablet Take 1 Tab by mouth daily. 90 Tab 3    MULTIVIT-MIN/IRON/FOLIC/LUTEIN (CENTRUM SILVER WOMEN PO) Take  by mouth.  ELIQUIS 5 mg tablet Take 1 Tab by mouth two (2) times a day. 120 Tab 3    escitalopram oxalate (LEXAPRO) 20 mg tablet Take 20 mg by mouth daily.  metFORMIN ER (GLUCOPHAGE XR) 500 mg tablet Take 500 mg by mouth daily (with dinner).  exemestane (AROMASIN) 25 mg tablet Take 25 mg by mouth daily. EKG: unchanged from previous tracings, 1:1 Biv.pacing  . ASSESSMENT and PLAN  Encounter Diagnoses   Name Primary?  Cardiomyopathy, idiopathic, EF now up to 55% Yes    PAF (paroxysmal atrial fibrillation) (HCC)     Biventricular implantable cardioverter-defibrillator in situ    She has been doing very well. She has had no symptoms to indicate cardiac decompensation. Interrogation of her ICD demonstrated normal OptiVol indicating normal volume status. She has had no major cardiac arrhythmia. Her blood pressure has been under control. For now, she will be continued on current medical regimen.

## 2018-02-20 NOTE — PROGRESS NOTES
1. Have you been to the ER, urgent care clinic since your last visit? Hospitalized since your last visit? No    2. Have you seen or consulted any other health care providers outside of the [address] [place] [address] since your last visit? Include any pap smears or colon screening.  No

## 2018-02-20 NOTE — MR AVS SNAPSHOT
[address] [place] [id number] [phone] Patient: Ivana Gudino MRN: [id number] :1945 Visit Information Date & Time Provider Department Dept. Phone Encounter #  
 2018  3:00 PM Brennen Etienne MD [facility] [address] [id number] Your Appointments 3/21/2018  3:00 PM  
Follow Up with Jessy Webb MD  
[address] ([address]) Appt Note: 4MFU FROM [address]  
 [address] [place] [address], [address] [place] [address]  
[phone]  
  
   
 [address] [place] [address], [address] [facility] [place] [address] Upcoming Health Maintenance Date Due DTaP/Tdap/Td series (1 - Tdap) 1965 BREAST CANCER SCRN MAMMOGRAM 1994 FOBT Q 1 YEAR AGE 50-75 1994 ZOSTER VACCINE AGE 60> 10/27/2004 GLAUCOMA SCREENING Q2Y 2009 MEDICARE YEARLY EXAM 2009 Influenza Age 5 to Adult 2017 Pneumococcal 65+ Low/Medium Risk (2 of 2 - PCV13) 3/8/2018* *Topic was postponed. The date shown is not the original due date. Allergies as of 2018  Review Complete On: 2018 By: Brennen Etienne MD  
  
 Severity Noted Reaction Type Reactions Lisinopril High 2010    Rash Hypertensive crisis Tetracycline High 2012    Anaphylaxis, Rash, Swelling Hibiclens [Chlorhexidine Gluconate]  2017    Other (comments) Turned red and BP drop low. Other Medication  2010    Rash, Swelling  
 -Myacins Current Immunizations  Reviewed on 2017 Name Date Pneumococcal Polysaccharide (PPSV-23) 2016 Not reviewed this visit You Were Diagnosed With   
  
 Codes Comments PAF (paroxysmal atrial fibrillation) ([facility]    -  Primary ICD-10-CM: I48.0 ICD-9-CM: 427.31 Cardiomyopathy, idiopathic ([facility]     ICD-10-CM: I42.8 ICD-9-CM: 425.4 Biventricular implantable cardioverter-defibrillator in situ     ICD-10-CM: Z95.810 ICD-9-CM: V45.02 Vitals BP Pulse Height(growth percentile) Weight(growth percentile) SpO2 BMI  
 118/80 60 5' 5\" (1.651 m) 213 lb (96.6 kg) 97% 35.45 kg/m2 OB Status Smoking Status Postmenopausal Former Smoker BMI and BSA Data Body Mass Index Body Surface Area  
 35.45 kg/m 2 2.1 m 2 Preferred Pharmacy Pharmacy Name Phone [address] [place] [address] [phone] Your Updated Medication List  
  
   
This list is accurate as of: 2/20/18  3:39 PM.  Always use your most recent med list.  
  
  
  
  
 atorvastatin 40 mg tablet Commonly known as:  LIPITOR Take 1 Tab by mouth daily. CENTRUM SILVER WOMEN PO Take  by mouth. ELIQUIS 5 mg tablet Generic drug:  apixaban Take 1 Tab by mouth two (2) times a day. escitalopram oxalate 20 mg tablet Commonly known as:  Ihsan [place] Take 20 mg by mouth daily. exemestane 25 mg tablet Commonly known as:  Kevin Frees Take 25 mg by mouth daily. metFORMIN  mg tablet Commonly known as:  GLUCOPHAGE XR Take 500 mg by mouth daily (with dinner). metoprolol tartrate 100 mg IR tablet Commonly known as:  LOPRESSOR  
TAKE ONE TABLET BY MOUTH TWICE DAILY We Performed the Following AMB POC EKG ROUTINE W/ 12 LEADS, INTER & REP [16360 CPT(R)] Introducing [facility] & HEALTH SERVICES! [employer] introduces [employer] patient portal. Now you can access parts of your medical record, email your doctor's office, and request medication refills online. 1. In your internet browser, go to [email or web address] 2. Click on the First Time User? Click Here link in the Sign In box. You will see the New Member Sign Up page. 3. Enter your [employer] Access Code exactly as it appears below.  You will not need to use this code after youve completed the sign-up process. If you do not sign up before the expiration date, you must request a new code. · [employer] Access Code: [email or web address] Expires: 5/21/2018  3:39 PM 
 
4. Enter the last four digits of your Social Security Number (xxxx) and Date of Birth (mm/dd/yyyy) as indicated and click Submit. You will be taken to the next sign-up page. 5. Create a [employer] ID. This will be your [employer] login ID and cannot be changed, so think of one that is secure and easy to remember. 6. Create a [employer] password. You can change your password at any time. 7. Enter your Password Reset Question and Answer. This can be used at a later time if you forget your password. 8. Enter your e-mail address. You will receive e-mail notification when new information is available in [address] 9. Click Sign Up. You can now view and download portions of your medical record. 10. Click the Download Summary menu link to download a portable copy of your medical information. If you have questions, please visit the Frequently Asked Questions section of the [employer] website. Remember, [employer] is NOT to be used for urgent needs. For medical emergencies, dial 911. Now available from your iPhone and Android! Please provide this summary of care documentation to your next provider. Your primary care clinician is listed as Nat Babinski. If you have any questions after today's visit, please call [phone].

## 2018-02-21 NOTE — PROGRESS NOTES
I have personally seen and evaluated the device findings. Interrogation reviewed and I agree with assessment.     Manuel Acevedo

## 2018-03-13 ENCOUNTER — OFFICE VISIT (OUTPATIENT)
Dept: PULMONOLOGY | Age: 73
End: 2018-03-13

## 2018-03-13 VITALS
HEART RATE: 69 BPM | OXYGEN SATURATION: 96 % | WEIGHT: 216 LBS | RESPIRATION RATE: 18 BRPM | SYSTOLIC BLOOD PRESSURE: 132 MMHG | TEMPERATURE: 98.1 F | HEIGHT: 65 IN | BODY MASS INDEX: 35.99 KG/M2 | DIASTOLIC BLOOD PRESSURE: 90 MMHG

## 2018-03-13 DIAGNOSIS — J45.40 MODERATE PERSISTENT ASTHMA WITHOUT COMPLICATION: Primary | ICD-10-CM

## 2018-03-13 DIAGNOSIS — I26.99 OTHER PULMONARY EMBOLISM WITHOUT ACUTE COR PULMONALE, UNSPECIFIED CHRONICITY (HCC): ICD-10-CM

## 2018-03-13 DIAGNOSIS — I48.0 PAF (PAROXYSMAL ATRIAL FIBRILLATION) (HCC): ICD-10-CM

## 2018-03-13 PROBLEM — J47.0 BRONCHIECTASIS WITH ACUTE LOWER RESPIRATORY INFECTION (HCC): Status: ACTIVE | Noted: 2018-03-13

## 2018-03-13 RX ORDER — UMECLIDINIUM 62.5 UG/1
1 AEROSOL, POWDER ORAL DAILY
COMMUNITY
Start: 2018-01-16 | End: 2018-07-29

## 2018-03-13 NOTE — PROGRESS NOTES
Chief Complaint   Patient presents with    COPD    Follow-up     P.E     Patient here for routine follow up visit. [facility]    [address] [place] [address], [facility] [address], [address]      SIMPLE PULMONARY STRESS TEST - 6 MINUTE WALK    PATIENT NAME: Juju Mendoza    DATE: 3/13/2018     YOB: 1945     AGE: 67 y.o. DIAGNOSIS: No diagnosis found. TECHNICIAN: John Cohen LPN          PHYSICIAN:       Visit Vitals    /90 (BP 1 Location: Left arm, BP Patient Position: Sitting)    Pulse 69    Temp 98.1 °F (36.7 °C) (Oral)    Resp 18    Ht 5' 5\" (1.651 m)    Wt 98 kg (216 lb)    SpO2 96%  Comment: [email or web address]    BMI 35.94 kg/m2        RESTING DATA:RA95% HR 65 RR20 Dyspnea Scale (1-10):   0 SOB    EXERCISE DATA:   6 MINUTE WALK - HALLWAY (110FT)    1   RA   92 % SAT   85 HR   0 SOB    1 Laps x 110ft = 110ft  2   RA   91 % SAT   91 HR   0 SOB    2 Laps x 110ft = 220ft  3   RA   91 % SAT   94 HR   0 SOB    3 Laps x 110ft = 330ft  4   RA   92 % SAT   98 HR   0 SOB    4 Laps x 110ft = 440ft  5   RA   90 % SAT   97 HR   0 SOB    5 Laps x 110ft = 550ft  6   RA   91 % SAT   98 HR   0 SOB    6 Laps x 110ft = 660ft    TOTAL DISTANCE: 660FT    RECOVERY DATA:      1   RA   94 % SAT   78 HR   22 RR   132/90 BP   0 SOB        TECHNICIAN COMMENTS: Patient tolerated walk test well .

## 2018-03-13 NOTE — MR AVS SNAPSHOT
[address] [facility], [address] [address] 
[phone] Patient: Lisa Diamond MRN: [id number] :1945 Visit Information Date & Time Provider Department Dept. Phone Encounter #  
 3/13/2018 11:00 AM MD Jennifer Lopez [facility] [id number] Follow-up Instructions Return in about 6 months (around 2018). Your Appointments 2018  2:40 PM  
CARELINK with Pacer Hv Csi [facility] ([address]) Appt Note: 3 month carelink [place] [id number]  
[phone] [address] [facility] [address] [address]  
  
    
 2018  2:00 PM  
Follow Up with Gianni Armstrong MD  
[facility] ([address]) Appt Note: 6 month f/up [place] [phone]  
[phone] [facility] [address] Upcoming Health Maintenance Date Due DTaP/Tdap/Td series (1 - Tdap) 1965 BREAST CANCER SCRN MAMMOGRAM 1994 FOBT Q 1 YEAR AGE 50-75 1994 ZOSTER VACCINE AGE 60> 10/27/2004 GLAUCOMA SCREENING Q2Y 2009 MEDICARE YEARLY EXAM 2009 Influenza Age 5 to Adult 2017 Pneumococcal 65+ Low/Medium Risk (2 of 2 - PCV13) 2017 Allergies as of 3/13/2018  Review Complete On: 3/13/2018 By: Awilda Mariscal MD  
  
 Severity Noted Reaction Type Reactions Lisinopril High 2010    Rash Hypertensive crisis Tetracycline High 2012    Anaphylaxis, Rash, Swelling Hibiclens [Chlorhexidine Gluconate]  2017    Other (comments) Turned red and BP drop low. Other Medication  2010    Rash, Swelling  
 -Myacins Current Immunizations  Reviewed on 2017 Name Date Pneumococcal Polysaccharide (PPSV-23) 11/8/2016 Not reviewed this visit Vitals BP Pulse Temp Resp Height(growth percentile) Weight(growth percentile) 132/90 (BP 1 Location: Left arm, BP Patient Position: Sitting) 69 98.1 °F (36.7 °C) (Oral) 18 5' 5\" (1.651 m) 216 lb (98 kg) SpO2 BMI OB Status Smoking Status 96% 35.94 kg/m2 Postmenopausal Former Smoker BMI and BSA Data Body Mass Index Body Surface Area 35.94 kg/m 2 2.12 m 2 Preferred Pharmacy Pharmacy Name Phone [address] [phone] Your Updated Medication List  
  
   
This list is accurate as of 3/13/18 11:54 AM.  Always use your most recent med list.  
  
  
  
  
 atorvastatin 40 mg tablet Commonly known as:  LIPITOR Take 1 Tab by mouth daily. CENTRUM SILVER WOMEN PO Take  by mouth. ELIQUIS 5 mg tablet Generic drug:  apixaban Take 1 Tab by mouth two (2) times a day. escitalopram oxalate 20 mg tablet Commonly known as:  Abbie Jeovany Take 20 mg by mouth daily. exemestane 25 mg tablet Commonly known as:  Minerva Henry Take 25 mg by mouth daily. INCRUSE ELLIPTA 62.5 mcg/actuation inhaler Generic drug:  umeclidinium Take 1 Puff by inhalation daily. metFORMIN  mg tablet Commonly known as:  GLUCOPHAGE XR Take 500 mg by mouth two (2) times a day. metoprolol tartrate 100 mg IR tablet Commonly known as:  LOPRESSOR  
TAKE ONE TABLET BY MOUTH TWICE DAILY Follow-up Instructions Return in about 6 months (around 9/13/2018). Introducing hospitals & HEALTH SERVICES! [employer] introduces [employer] patient portal. Now you can access parts of your medical record, email your doctor's office, and request medication refills online. 1. In your internet browser, go to [email or web address] 2. Click on the First Time User? Click Here link in the Sign In box.  You will see the New Member Sign Up page. 3. Enter your [employer] Access Code exactly as it appears below. You will not need to use this code after youve completed the sign-up process. If you do not sign up before the expiration date, you must request a new code. · [employer] Access Code: [email or web address] Expires: 5/21/2018  4:39 PM 
 
4. Enter the last four digits of your Social Security Number (xxxx) and Date of Birth (mm/dd/yyyy) as indicated and click Submit. You will be taken to the next sign-up page. 5. Create a [employer] ID. This will be your [employer] login ID and cannot be changed, so think of one that is secure and easy to remember. 6. Create a [employer] password. You can change your password at any time. 7. Enter your Password Reset Question and Answer. This can be used at a later time if you forget your password. 8. Enter your e-mail address. You will receive e-mail notification when new information is available in [address] 9. Click Sign Up. You can now view and download portions of your medical record. 10. Click the Download Summary menu link to download a portable copy of your medical information. If you have questions, please visit the Frequently Asked Questions section of the [employer] website. Remember, [employer] is NOT to be used for urgent needs. For medical emergencies, dial 911. Now available from your iPhone and Android! Please provide this summary of care documentation to your next provider. Your primary care clinician is listed as Talha Cameron. If you have any questions after today's visit, please call [phone].

## 2018-03-13 NOTE — PROGRESS NOTES
Progress Note:Follow up Visit    03/13/18     Patient feels at baseline. Uses Albuterol PRN prior to season changes/weather changes and activity. Has had episodes of increased sputum needing antibiotics- amoxillin- levaquin and finally cleared with Augmentin  Denies increase in cough or productive expectoration at present. Denies chest pain. C/o RAMOS which limits her ADL's- usually when she is rushing  Cannot walk distance/ climb stairs. Not able to exercise due to SOB. Has Oxygen at home- \"not using\". Mark Denny requesting qualifying testing       HPI: Ms. Reji Du who carries history of COPD, recurrent bronchitis requiring visit to Urgent care, Diagnosis of PE on NOAC , Cardiomyopathy S/P pacemaker and defibrillator, and history of left breast cancer is evaluated for COPD/Chronic bronchitis work up. Last CT performed in MAY 2016 demonstrated PE, nonspecific mediastinal adenopathy, minimal atelectasis/small effusion, and hyperinflation. We have obtained follow up CT chest performed on 09/12/16 did not reveal any finding concerning PE or Mediastinal adenopathy. . During this interval time patient has not noticed any symptoms concerning COPD exacerbation. Her  RAMOS remains stable. 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 Subsequent follow ups- never has had TB. Allergies   Allergen Reactions    Lisinopril Rash     Hypertensive crisis    Tetracycline Anaphylaxis, Rash and Swelling    Hibiclens [Chlorhexidine Gluconate] Other (comments)     Turned red and BP drop low.  Other Medication Rash and Swelling     -Myacins     Current Outpatient Prescriptions   Medication Sig Dispense Refill    metoprolol tartrate (LOPRESSOR) 100 mg IR tablet TAKE ONE TABLET BY MOUTH TWICE DAILY 180 Tab 3    atorvastatin (LIPITOR) 40 mg tablet Take 1 Tab by mouth daily. 90 Tab 3    MULTIVIT-MIN/IRON/FOLIC/LUTEIN (CENTRUM SILVER WOMEN PO) Take  by mouth.       ELIQUIS 5 mg tablet Take 1 Tab by mouth two (2) times a day. 120 Tab 3    escitalopram oxalate (LEXAPRO) 20 mg tablet Take 20 mg by mouth daily.  metFORMIN ER (GLUCOPHAGE XR) 500 mg tablet Take 500 mg by mouth two (2) times a day.  exemestane (AROMASIN) 25 mg tablet Take 25 mg by mouth daily. Review of Systems   Constitutional: Negative. HENT: Negative. Eyes: Negative. Respiratory: cough, SOB  Cardiovascular: Negative. Gastrointestinal: Negative. Genitourinary: Negative. Musculoskeletal: Negative. Skin: Negative. Neurological: Negative. Endo/Heme/Allergies: Negative. Psychiatric/Behavioral: depression      Blood pressure 132/90, pulse 69, temperature 98.1 °F (36.7 °C), temperature source Oral, resp. rate 18, height 5' 5\" (1.651 m), weight 98 kg (216 lb), SpO2 96 %. Physical Exam   Constitutional: She is oriented to person, place, and time and well-developed, well-nourished, and in no distress. HENT:   Head: Normocephalic and atraumatic. Eyes: EOM are normal. Pupils are equal, round, and reactive to light. Neck: Normal range of motion. Neck supple. No thyromegaly present. Cardiovascular: Normal rate and regular rhythm. Pulmonary/Chest: Effort normal. She has no wheezes. She has no rales. Abdominal: Soft. Bowel sounds are normal.   Musculoskeletal: Normal range of motion. She exhibits no edema. Neurological: She is alert and oriented to person, place, and time. Skin: Skin is warm and dry. Psychiatric: Affect and judgment normal.       Investigation:      Results from Hospital Encounter encounter on 09/12/16   CTA CHEST W WO CONT   Narrative CT chest with contrast for PE    CPT CODE: 95974     HISTORY: COPD, mediastinal adenopathy. On anticoagulant therapy    COMPARISON: 5/10/16. TECHNIQUE: Dynamic spiral scan through the chest is obtained from the thoracic  inlet to the diaphragm after dynamic nonionic IV contrast administration  per PE  protocol.  Coronal and sagittal MIP computer reconstructions are also obtained  for better visualization of the integrity of pulmonary arteries in 3D dimension,  particularly for lobar/interlobar arterial branches and to minimize radiation  dose. All CT scans at this facility performed using dose optimization techniques as  appreciated to a performed exam, to include automated exposure control,  adjustment of the mA and or KU according to patient size (including appropriate  matching for site specific examination), or use of iterative reconstruction  technique. CONTRAST: 100 cc Isovue 370. FINDINGS:    PULMONARY ARTERIES: The contrast bolus is adequate. The right and left mainstem  pulmonary arteries and their branches appear patent without convincing evidence  of intraluminal filling defect identified to suggest pulmonary embolism. Multiple intraluminal clots seen in right segmental/subsegmental branches seen  on prior and no longer evident today. There is again borderline prominence of  main pulmonary arteries. AORTA AND THE GREAT VESSELS: Unremarkable. LUNG PARENCHYMA: Chronic peripheral atelectasis at the lateral left lower lobe  again noted. Mild COPD and bronchiectasis. No acute pulmonary infarction or  infiltration seen. IMAGED THYROID: Unremarkable. MEDIASTINUM: No adenopathy. HEART: The heart and the pericardium appear unremarkable. PLEURAL SPACES AND CHEST WALL: The small right pleural effusion has been  interval resolved. . The ovoid and fluid attenuation lesion in posterior left  breast is again identified with no significant interval change in size and  appearance. It measures 2.7 x 4.0 cm. VISUALIZED UPPER ABDOMEN: Unremarkable. OSSEOUS STRUCTURES: Unremarkable. Impression IMPRESSION:    1. No convincing CT evidence of pulmonary embolism. Interval resolution of  multifocal right sided PE seen on prior CT. 2.  No acute pulmonary finding. Mild COPD and bronchiectasis.     3. Interval resolution of small right pleural effusion. 4. Stable fluid attenuation lesion in posterior left breast, probably post  procedure seroma? Ultrasound follow-up advised. Thank you for your referral.            CT chest (05/10/16): There are filling defects in the upper, middle, and lower lobe branches of the right pulmonary artery. No evidence of embolus in the left pulmonary artery branches or in the central trunks. The main pulmonary artery measures 3.3 cm in diameter. The thoracic aorta is not aneurysmal.  No evidence for dissection.      Lymph nodes: Mediastinal lymph nodes are mildly prominent. A right paratracheal lymph node measures 1.2 x 0.9 cm. Previously, this lymph node measured 1.8 x 0.4 cm. There is no evidence of hilar lymphadenopathy.      Thyroid: Normal in size without mass in the visualized parenchyma. .      Mediastinum: There is a pacemaker in the left chest wall. A fluid collection in the inferior left breast measures about 3.3 x 4.8 cm (previously, 2.8 x 4.4 cm). There is no evidence of peripheral enhancement. 2 adjacent biopsy clips are redemonstrated. There is a small hiatal hernia.      Lungs:    The lungs are diffusely hyperinflated with mild tracheobronchomalacia. Right Lung: There is a small posterior effusion with overlying atelectasis. No confluent infiltrate to suggest infarct.      Left Lung:  No evidence of infiltrate. There is a small focus of atelectasis in the posterior lingula. This is similar to previous exam. No pleural effusion.      Abdomen: There is diffuse fatty atrophy of the pancreas. Visualized portions of the liver, spleen, pancreas, adrenal glands, and kidneys are unremarkable. The gallbladder is normal.      Bones: A fracture deformity in the left anterior fifth rib is unchanged. Degenerative changes of the spine are stable. 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 There are no destructive lesions.      IMPRESSION:      1.  Multiple right pulmonary emboli.  No evidence of left pulmonary emboli or pulmonary infarcts. .   2.  Small right pleural effusion. 3. Prominence of the main portal artery is suggestive of pulmonary artery hypertension. 4. COPD and mild tracheobronchomalacia. 5. Similar size of seroma in the left chest wall.    .          PFT(2008): mild obstructive changes   DATE 4/19/2017  Pre bronchodilator ( 2008) Post Bronchodilator (2008)   FVC 2. 11( 69)  2.12(67) 2.13(67)   FEV1 1.60( 69)  1.69(73) 1.64(71)   FEV1/FVC 76  79(73) 76   TLC 4.04(78)  4.32(84)     RV 2.14(79)  2.12(107)     RV/TLC   49(38)     DLCO 13.76( 61)  12.47(66)        PFT 4/19/17:  Mild to moderate restrictive impairment with reduced DLCO. Assessment:    Bronchiectasis: Recurrent bronchitis/COPD exacerbation with Mild-Moderate Restrictive impairment ? sequelae of prior inflammatory process. Recurrant bronchitis- lower respiratory tract infections treated with antibiotics  History of COPD - exertional hypoxemia in past has now improved. Nonspecific Mediastinal adenopathy- S/P follow up CT chest demonstrated interval resolution  9/2016 CT. PE on NOAC ,5/206. Resolved on follow up CT in 9/2016  Atrial fibrillation  H/o breast biopsy- 2010. H/o radiation   Depression        Plan:  Action plan for acute exacerbations discussed  Continue Albuterol two puffs, every six hours as needed   Continue ELIQUIS as prescribed . Explained need and addressed concerns about drug- drug interactions  Oxygen requirement. - did not desaturate with walking during walk test today. - Has O2 at home- not using as much. Will discontinue. Orders placed. Encouraged active lifestyle and endurance activities.   Preventive vaccinations- up to date  RTC in 6  months time and sooner if needed.       Godfrey Donald  Pulmonary and Critical Care Medicine

## 2018-03-13 NOTE — COMMUNICATION BODY
Progress Note:Follow up Visit    03/13/18     Patient feels at baseline. Uses Albuterol PRN prior to season changes/weather changes and activity. Has had episodes of increased sputum needing antibiotics- amoxillin- levaquin and finally cleared with Augmentin  Denies increase in cough or productive expectoration at present. Denies chest pain. C/o RAMOS which limits her ADL's- usually when she is rushing  Cannot walk distance/ climb stairs. Not able to exercise due to SOB. Has Oxygen at home- \"not using\". Jennifer Yeh requesting qualifying testing       HPI: Ms. Idalia Drummond who carries history of COPD, recurrent bronchitis requiring visit to Urgent care, Diagnosis of PE on NOAC , Cardiomyopathy S/P pacemaker and defibrillator, and history of left breast cancer is evaluated for COPD/Chronic bronchitis work up. Last CT performed in MAY 2016 demonstrated PE, nonspecific mediastinal adenopathy, minimal atelectasis/small effusion, and hyperinflation. We have obtained follow up CT chest performed on 09/12/16 did not reveal any finding concerning PE or Mediastinal adenopathy. . During this interval time patient has not noticed any symptoms concerning COPD exacerbation. Her  RAMOS remains stable. 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 Subsequent follow ups- never has had TB. Allergies   Allergen Reactions    Lisinopril Rash     Hypertensive crisis    Tetracycline Anaphylaxis, Rash and Swelling    Hibiclens [Chlorhexidine Gluconate] Other (comments)     Turned red and BP drop low.  Other Medication Rash and Swelling     -Myacins     Current Outpatient Prescriptions   Medication Sig Dispense Refill    metoprolol tartrate (LOPRESSOR) 100 mg IR tablet TAKE ONE TABLET BY MOUTH TWICE DAILY 180 Tab 3    atorvastatin (LIPITOR) 40 mg tablet Take 1 Tab by mouth daily. 90 Tab 3    MULTIVIT-MIN/IRON/FOLIC/LUTEIN (CENTRUM SILVER WOMEN PO) Take  by mouth.       ELIQUIS 5 mg tablet Take 1 Tab by mouth two (2) times a day. 120 Tab 3    escitalopram oxalate (LEXAPRO) 20 mg tablet Take 20 mg by mouth daily.  metFORMIN ER (GLUCOPHAGE XR) 500 mg tablet Take 500 mg by mouth two (2) times a day.  exemestane (AROMASIN) 25 mg tablet Take 25 mg by mouth daily. Review of Systems   Constitutional: Negative. HENT: Negative. Eyes: Negative. Respiratory: cough, SOB  Cardiovascular: Negative. Gastrointestinal: Negative. Genitourinary: Negative. Musculoskeletal: Negative. Skin: Negative. Neurological: Negative. Endo/Heme/Allergies: Negative. Psychiatric/Behavioral: depression      Blood pressure 132/90, pulse 69, temperature 98.1 °F (36.7 °C), temperature source Oral, resp. rate 18, height 5' 5\" (1.651 m), weight 98 kg (216 lb), SpO2 96 %. Physical Exam   Constitutional: She is oriented to person, place, and time and well-developed, well-nourished, and in no distress. HENT:   Head: Normocephalic and atraumatic. Eyes: EOM are normal. Pupils are equal, round, and reactive to light. Neck: Normal range of motion. Neck supple. No thyromegaly present. Cardiovascular: Normal rate and regular rhythm. Pulmonary/Chest: Effort normal. She has no wheezes. She has no rales. Abdominal: Soft. Bowel sounds are normal.   Musculoskeletal: Normal range of motion. She exhibits no edema. Neurological: She is alert and oriented to person, place, and time. Skin: Skin is warm and dry. Psychiatric: Affect and judgment normal.       Investigation:      Results from Hospital Encounter encounter on 09/12/16   CTA CHEST W WO CONT   Narrative CT chest with contrast for PE    CPT CODE: 10518     HISTORY: COPD, mediastinal adenopathy. On anticoagulant therapy    COMPARISON: 5/10/16. TECHNIQUE: Dynamic spiral scan through the chest is obtained from the thoracic  inlet to the diaphragm after dynamic nonionic IV contrast administration  per PE  protocol.  Coronal and sagittal MIP computer reconstructions are also obtained  for better visualization of the integrity of pulmonary arteries in 3D dimension,  particularly for lobar/interlobar arterial branches and to minimize radiation  dose. All CT scans at this facility performed using dose optimization techniques as  appreciated to a performed exam, to include automated exposure control,  adjustment of the mA and or KU according to patient size (including appropriate  matching for site specific examination), or use of iterative reconstruction  technique. CONTRAST: 100 cc Isovue 370. FINDINGS:    PULMONARY ARTERIES: The contrast bolus is adequate. The right and left mainstem  pulmonary arteries and their branches appear patent without convincing evidence  of intraluminal filling defect identified to suggest pulmonary embolism. Multiple intraluminal clots seen in right segmental/subsegmental branches seen  on prior and no longer evident today. There is again borderline prominence of  main pulmonary arteries. AORTA AND THE GREAT VESSELS: Unremarkable. LUNG PARENCHYMA: Chronic peripheral atelectasis at the lateral left lower lobe  again noted. Mild COPD and bronchiectasis. No acute pulmonary infarction or  infiltration seen. IMAGED THYROID: Unremarkable. MEDIASTINUM: No adenopathy. HEART: The heart and the pericardium appear unremarkable. PLEURAL SPACES AND CHEST WALL: The small right pleural effusion has been  interval resolved. . The ovoid and fluid attenuation lesion in posterior left  breast is again identified with no significant interval change in size and  appearance. It measures 2.7 x 4.0 cm. VISUALIZED UPPER ABDOMEN: Unremarkable. OSSEOUS STRUCTURES: Unremarkable. Impression IMPRESSION:    1. No convincing CT evidence of pulmonary embolism. Interval resolution of  multifocal right sided PE seen on prior CT. 2.  No acute pulmonary finding. Mild COPD and bronchiectasis.     3. Interval resolution of small right pleural effusion. 4. Stable fluid attenuation lesion in posterior left breast, probably post  procedure seroma? Ultrasound follow-up advised. Thank you for your referral.            CT chest (05/10/16): There are filling defects in the upper, middle, and lower lobe branches of the right pulmonary artery. No evidence of embolus in the left pulmonary artery branches or in the central trunks. The main pulmonary artery measures 3.3 cm in diameter. The thoracic aorta is not aneurysmal.  No evidence for dissection.      Lymph nodes: Mediastinal lymph nodes are mildly prominent. A right paratracheal lymph node measures 1.2 x 0.9 cm. Previously, this lymph node measured 1.8 x 0.4 cm. There is no evidence of hilar lymphadenopathy.      Thyroid: Normal in size without mass in the visualized parenchyma. .      Mediastinum: There is a pacemaker in the left chest wall. A fluid collection in the inferior left breast measures about 3.3 x 4.8 cm (previously, 2.8 x 4.4 cm). There is no evidence of peripheral enhancement. 2 adjacent biopsy clips are redemonstrated. There is a small hiatal hernia.      Lungs:    The lungs are diffusely hyperinflated with mild tracheobronchomalacia. Right Lung: There is a small posterior effusion with overlying atelectasis. No confluent infiltrate to suggest infarct.      Left Lung:  No evidence of infiltrate. There is a small focus of atelectasis in the posterior lingula. This is similar to previous exam. No pleural effusion.      Abdomen: There is diffuse fatty atrophy of the pancreas. Visualized portions of the liver, spleen, pancreas, adrenal glands, and kidneys are unremarkable. The gallbladder is normal.      Bones: A fracture deformity in the left anterior fifth rib is unchanged. Degenerative changes of the spine are stable. 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 There are no destructive lesions.      IMPRESSION:      1.  Multiple right pulmonary emboli.  No evidence of left pulmonary emboli or pulmonary infarcts. .   2.  Small right pleural effusion. 3. Prominence of the main portal artery is suggestive of pulmonary artery hypertension. 4. COPD and mild tracheobronchomalacia. 5. Similar size of seroma in the left chest wall.    .          PFT(2008): mild obstructive changes   DATE 4/19/2017  Pre bronchodilator ( 2008) Post Bronchodilator (2008)   FVC 2. 11( 69)  2.12(67) 2.13(67)   FEV1 1.60( 69)  1.69(73) 1.64(71)   FEV1/FVC 76  79(73) 76   TLC 4.04(78)  4.32(84)     RV 2.14(79)  2.12(107)     RV/TLC   49(38)     DLCO 13.76( 61)  12.47(66)        PFT 4/19/17:  Mild to moderate restrictive impairment with reduced DLCO. Assessment:    Bronchiectasis: Recurrent bronchitis/COPD exacerbation with Mild-Moderate Restrictive impairment ? sequelae of prior inflammatory process. Recurrant bronchitis- lower respiratory tract infections treated with antibiotics  History of COPD - exertional hypoxemia in past has now improved. Nonspecific Mediastinal adenopathy- S/P follow up CT chest demonstrated interval resolution  9/2016 CT. PE on NOAC ,5/206. Resolved on follow up CT in 9/2016  Atrial fibrillation  H/o breast biopsy- 2010. H/o radiation   Depression        Plan:  Action plan for acute exacerbations discussed  Continue Albuterol two puffs, every six hours as needed   Continue ELIQUIS as prescribed . Explained need and addressed concerns about drug- drug interactions  Oxygen requirement. - did not desaturate with walking during walk test today. - Has O2 at home- not using as much. Will discontinue. Orders placed. Encouraged active lifestyle and endurance activities.   Preventive vaccinations- up to date  RTC in 6  months time and sooner if needed.       Caleb Vale  Pulmonary and Critical Care Medicine

## 2018-03-26 ENCOUNTER — HOSPITAL ENCOUNTER (OUTPATIENT)
Dept: GENERAL RADIOLOGY | Age: 73
Discharge: HOME | End: 2018-03-26
Payer: MEDICARE

## 2018-03-26 DIAGNOSIS — J06.9 URI (UPPER RESPIRATORY INFECTION): ICD-10-CM

## 2018-03-26 PROCEDURE — 71046 X-RAY EXAM CHEST 2 VIEWS: CPT

## 2018-04-12 ENCOUNTER — HOSPITAL ENCOUNTER (OUTPATIENT)
Dept: BONE DENSITY | Age: 73
Discharge: HOME | End: 2018-04-12
Attending: FAMILY MEDICINE
Payer: MEDICARE

## 2018-04-12 DIAGNOSIS — Z78.0 ASYMPTOMATIC MENOPAUSE: ICD-10-CM

## 2018-04-12 DIAGNOSIS — Z13.820 SCREENING FOR OSTEOPOROSIS: ICD-10-CM

## 2018-04-12 PROCEDURE — 77080 DXA BONE DENSITY AXIAL: CPT

## 2018-05-24 ENCOUNTER — OFFICE VISIT (OUTPATIENT)
Dept: CARDIOLOGY CLINIC | Age: 73
End: 2018-05-24

## 2018-05-24 DIAGNOSIS — Z95.810 BIVENTRICULAR IMPLANTABLE CARDIOVERTER-DEFIBRILLATOR IN SITU: ICD-10-CM

## 2018-05-24 DIAGNOSIS — I44.7 LEFT BUNDLE BRANCH BLOCK: Primary | ICD-10-CM

## 2018-05-25 NOTE — PROGRESS NOTES
I have personally seen and evaluated the device findings. Interrogation reviewed and I agree with assessment.     Ruma Lee

## 2018-07-03 NOTE — LETTER
3/13/2018 12:30 PM 
 
Patient:  Lacey Sevilla YOB: 1945 Date of Visit: 3/13/2018 Dear Raul Haas MD 
[address] [place] [id number] VIA Facsimile: [phone] 
 : Thank you for referring Ms. Claudy Doll to me for evaluation/treatment. Below are the relevant portions of my assessment and plan of care. Progress Note:Follow up Visit 03/13/18 Patient feels at baseline. Uses Albuterol PRN prior to season changes/weather changes and activity. Has had episodes of increased sputum needing antibiotics- amoxillin- levaquin and finally cleared with Augmentin Denies increase in cough or productive expectoration at present. Denies chest pain. C/o RAMOS which limits her ADL's- usually when she is rushing Cannot walk distance/ climb stairs. Not able to exercise due to SOB. Has Oxygen at home- \"not using\". Rosie Jackson requesting qualifying testing 
 
  
HPI: Ms. Behzad Capellan who carries history of COPD, recurrent bronchitis requiring visit to Urgent care, Diagnosis of PE on NOAC , Cardiomyopathy S/P pacemaker and defibrillator, and history of left breast cancer is evaluated for COPD/Chronic bronchitis work up. Last CT performed in MAY 2016 demonstrated PE, nonspecific mediastinal adenopathy, minimal atelectasis/small effusion, and hyperinflation. We have obtained follow up CT chest performed on 09/12/16 did not reveal any finding concerning PE or Mediastinal adenopathy. . During this interval time patient has not noticed any symptoms concerning COPD exacerbation. Her  RAMOS remains stable. 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 Subsequent follow ups- never has had TB. Allergies Allergen Reactions  Lisinopril Rash Hypertensive crisis  Tetracycline Anaphylaxis, Rash and Swelling  Hibiclens [Chlorhexidine Gluconate] Other (comments) Turned red and BP drop low.  Other Medication Rash and Swelling  
  -Myacins DUE TO RK. Current Outpatient Prescriptions Medication Sig Dispense Refill  metoprolol tartrate (LOPRESSOR) 100 mg IR tablet TAKE ONE TABLET BY MOUTH TWICE DAILY 180 Tab 3  
 atorvastatin (LIPITOR) 40 mg tablet Take 1 Tab by mouth daily. 90 Tab 3  MULTIVIT-MIN/IRON/FOLIC/LUTEIN (CENTRUM SILVER WOMEN PO) Take  by mouth.  ELIQUIS 5 mg tablet Take 1 Tab by mouth two (2) times a day. 120 Tab 3  
 escitalopram oxalate (LEXAPRO) 20 mg tablet Take 20 mg by mouth daily.  metFORMIN ER (GLUCOPHAGE XR) 500 mg tablet Take 500 mg by mouth two (2) times a day.  exemestane (AROMASIN) 25 mg tablet Take 25 mg by mouth daily. Review of Systems Constitutional: Negative. HENT: Negative. Eyes: Negative. Respiratory: cough, SOB Cardiovascular: Negative. Gastrointestinal: Negative. Genitourinary: Negative. Musculoskeletal: Negative. Skin: Negative. Neurological: Negative. Endo/Heme/Allergies: Negative. Psychiatric/Behavioral: depression Blood pressure 132/90, pulse 69, temperature 98.1 °F (36.7 °C), temperature source Oral, resp. rate 18, height 5' 5\" (1.651 m), weight 98 kg (216 lb), SpO2 96 %. Physical Exam  
Constitutional: She is oriented to person, place, and time and well-developed, well-nourished, and in no distress. HENT:  
Head: Normocephalic and atraumatic. Eyes: EOM are normal. Pupils are equal, round, and reactive to light. Neck: Normal range of motion. Neck supple. No thyromegaly present. Cardiovascular: Normal rate and regular rhythm. Pulmonary/Chest: Effort normal. She has no wheezes. She has no rales. Abdominal: Soft. Bowel sounds are normal.  
Musculoskeletal: Normal range of motion. She exhibits no edema. Neurological: She is alert and oriented to person, place, and time. Skin: Skin is warm and dry. Psychiatric: Affect and judgment normal.  
 
 
Investigation: 
 
 
Results from Hospital Encounter encounter on 09/12/16 CTA CHEST W WO CONT Narrative CT chest with contrast for PE 
 
CPT CODE: 95479 HISTORY: COPD, mediastinal adenopathy. On anticoagulant therapy COMPARISON: 5/10/16. TECHNIQUE: Dynamic spiral scan through the chest is obtained from the thoracic 
inlet to the diaphragm after dynamic nonionic IV contrast administration  per PE 
protocol. Coronal and sagittal MIP computer reconstructions are also obtained 
for better visualization of the integrity of pulmonary arteries in 3D dimension, 
particularly for lobar/interlobar arterial branches and to minimize radiation 
dose. All CT scans at this facility performed using dose optimization techniques as 
appreciated to a performed exam, to include automated exposure control, 
adjustment of the mA and or KU according to patient size (including appropriate 
matching for site specific examination), or use of iterative reconstruction 
technique. CONTRAST: 100 cc Isovue 370. FINDINGS: 
 
PULMONARY ARTERIES: The contrast bolus is adequate. The right and left mainstem 
pulmonary arteries and their branches appear patent without convincing evidence 
of intraluminal filling defect identified to suggest pulmonary embolism. Multiple intraluminal clots seen in right segmental/subsegmental branches seen on prior and no longer evident today. There is again borderline prominence of 
main pulmonary arteries. AORTA AND THE GREAT VESSELS: Unremarkable. LUNG PARENCHYMA: Chronic peripheral atelectasis at the lateral left lower lobe 
again noted. Mild COPD and bronchiectasis. No acute pulmonary infarction or 
infiltration seen. IMAGED THYROID: Unremarkable. MEDIASTINUM: No adenopathy. HEART: The heart and the pericardium appear unremarkable. PLEURAL SPACES AND CHEST WALL: The small right pleural effusion has been 
interval resolved. . The ovoid and fluid attenuation lesion in posterior left 
breast is again identified with no significant interval change in size and 
 appearance. It measures 2.7 x 4.0 cm. VISUALIZED UPPER ABDOMEN: Unremarkable. OSSEOUS STRUCTURES: Unremarkable. Impression IMPRESSION: 
 
1. No convincing CT evidence of pulmonary embolism. Interval resolution of 
multifocal right sided PE seen on prior CT. 2.  No acute pulmonary finding. Mild COPD and bronchiectasis. 3. Interval resolution of small right pleural effusion. 4. Stable fluid attenuation lesion in posterior left breast, probably post 
procedure seroma? Ultrasound follow-up advised. Thank you for your referral. 
 
   
 
 
CT chest (05/10/16): There are filling defects in the upper, middle, and lower lobe branches of the right pulmonary artery. No evidence of embolus in the left pulmonary artery branches or in the central trunks. The main pulmonary artery measures 3.3 cm in diameter. The thoracic aorta is not aneurysmal.  No evidence for dissection. 
   
Lymph nodes: Mediastinal lymph nodes are mildly prominent. A right paratracheal lymph node measures 1.2 x 0.9 cm. Previously, this lymph node measured 1.8 x 0.4 cm. There is no evidence of hilar lymphadenopathy. 
   
Thyroid: Normal in size without mass in the visualized parenchyma. . 
   
Mediastinum: There is a pacemaker in the left chest wall. A fluid collection in the inferior left breast measures about 3.3 x 4.8 cm (previously, 2.8 x 4.4 cm). There is no evidence of peripheral enhancement. 2 adjacent biopsy clips are redemonstrated. There is a small hiatal hernia. 
   
Lungs:   
The lungs are diffusely hyperinflated with mild tracheobronchomalacia. Right Lung: There is a small posterior effusion with overlying atelectasis. No confluent infiltrate to suggest infarct. 
   
Left Lung:  No evidence of infiltrate. There is a small focus of atelectasis in the posterior lingula. This is similar to previous exam. No pleural effusion. 
   
Abdomen: There is diffuse fatty atrophy of the pancreas.  Visualized portions of the liver, spleen, pancreas, adrenal glands, and kidneys are unremarkable. The gallbladder is normal. 
   
Bones: A fracture deformity in the left anterior fifth rib is unchanged. Degenerative changes of the spine are stable. 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 There are no destructive lesions. 
   
IMPRESSION: 
   
1.  Multiple right pulmonary emboli. No evidence of left pulmonary emboli or pulmonary infarcts. Ayah Gone 2.  Small right pleural effusion. 3. Prominence of the main portal artery is suggestive of pulmonary artery hypertension. 4. COPD and mild tracheobronchomalacia. 5. Similar size of seroma in the left chest wall. 
  .      
  
PFT(2008): mild obstructive changes DATE 4/19/2017  Pre bronchodilator ( 2008) Post Bronchodilator (2008) FVC 2. 11( 69)  2.12(67) 2.13(67) FEV1 1.60( 69)  1.69(73) 1.64(71) FEV1/FVC 76  79(73) 76 TLC 4.04(78)  4.32(84)    
RV 2.14(79)  2.12(107)    
RV/TLC   49(38)    
DLCO 13.76( 61)  12.47(66)    
  
PFT 4/19/17: 
Mild to moderate restrictive impairment with reduced DLCO. Assessment: 
 
Bronchiectasis: Recurrent bronchitis/COPD exacerbation with Mild-Moderate Restrictive impairment ? sequelae of prior inflammatory process. Recurrant bronchitis- lower respiratory tract infections treated with antibiotics History of COPD - exertional hypoxemia in past has now improved. Nonspecific Mediastinal adenopathy- S/P follow up CT chest demonstrated interval resolution  9/2016 CT. PE on NOAC ,5/206. Resolved on follow up CT in 9/2016 Atrial fibrillation H/o breast biopsy- 2010. H/o radiation Depression 
  
  
Plan: 
Action plan for acute exacerbations discussed Continue Albuterol two puffs, every six hours as needed Continue ELIQUIS as prescribed . Explained need and addressed concerns about drug- drug interactions Oxygen requirement. - did not desaturate with walking during walk test today.- Has O2 at home- not using as much. Will discontinue. Orders placed. Encouraged active lifestyle and endurance activities. Preventive vaccinations- up to date RTC in 6  months time and sooner if needed. 
  
 
[facility]  
  
 
 
 
 
If you have questions, please do not hesitate to call me. I look forward to following Ms. Kimball Guardian along with you.  
 
 
 
Sincerely, 
 
 
Asha Giron MD

## 2018-07-16 ENCOUNTER — TELEPHONE (OUTPATIENT)
Dept: PULMONOLOGY | Age: 73
End: 2018-07-16

## 2018-07-19 ENCOUNTER — CLINICAL SUPPORT (OUTPATIENT)
Dept: CARDIOLOGY CLINIC | Age: 73
End: 2018-07-19

## 2018-07-19 DIAGNOSIS — Z95.810 BIVENTRICULAR IMPLANTABLE CARDIOVERTER-DEFIBRILLATOR IN SITU: ICD-10-CM

## 2018-07-19 DIAGNOSIS — I44.7 LEFT BUNDLE BRANCH BLOCK: ICD-10-CM

## 2018-07-19 DIAGNOSIS — I42.9 CARDIOMYOPATHY, IDIOPATHIC (HCC): Primary | ICD-10-CM

## 2018-07-20 ENCOUNTER — HOSPITAL ENCOUNTER (OUTPATIENT)
Dept: GENERAL RADIOLOGY | Age: 73
Discharge: HOME | End: 2018-07-20
Payer: MEDICARE

## 2018-07-20 DIAGNOSIS — M54.50 LUMBAGO: ICD-10-CM

## 2018-07-20 DIAGNOSIS — M25.552 LEFT HIP PAIN: ICD-10-CM

## 2018-07-20 DIAGNOSIS — J18.9 PNEUMONIA: ICD-10-CM

## 2018-07-20 PROCEDURE — 72100 X-RAY EXAM L-S SPINE 2/3 VWS: CPT

## 2018-07-20 PROCEDURE — 71046 X-RAY EXAM CHEST 2 VIEWS: CPT

## 2018-07-20 PROCEDURE — 73501 X-RAY EXAM HIP UNI 1 VIEW: CPT

## 2018-07-20 NOTE — PROGRESS NOTES
I have personally seen and evaluated the device findings. Interrogation reviewed and I agree with assessment.     Dhaval Segura

## 2018-07-24 RX ORDER — DILTIAZEM HYDROCHLORIDE 240 MG/1
240 CAPSULE, COATED, EXTENDED RELEASE ORAL DAILY
Qty: 30 CAP | Refills: 6 | Status: SHIPPED | OUTPATIENT
Start: 2018-07-24 | End: 2018-07-29

## 2018-07-24 NOTE — TELEPHONE ENCOUNTER
Dr. Rivas Rhodes reviewed last device check. Start diltiazem 240 mg daily. Verbal order and read back per Maria Fernanda Novak MD    Patient given instructions. She verbalized understanding.

## 2018-07-29 ENCOUNTER — HOSPITAL ENCOUNTER (INPATIENT)
Age: 73
LOS: 2 days | Discharge: HOME | DRG: 309 | End: 2018-07-31
Attending: EMERGENCY MEDICINE | Admitting: FAMILY MEDICINE
Payer: MEDICARE

## 2018-07-29 ENCOUNTER — APPOINTMENT (OUTPATIENT)
Dept: GENERAL RADIOLOGY | Age: 73
DRG: 309 | End: 2018-07-29
Attending: PHYSICIAN ASSISTANT
Payer: MEDICARE

## 2018-07-29 ENCOUNTER — DOCUMENTATION ONLY (OUTPATIENT)
Dept: CARDIOLOGY CLINIC | Age: 73
End: 2018-07-29

## 2018-07-29 DIAGNOSIS — R00.2 PALPITATIONS: Primary | ICD-10-CM

## 2018-07-29 PROBLEM — I47.20 VENTRICULAR TACHYARRHYTHMIA: Status: ACTIVE | Noted: 2018-07-29

## 2018-07-29 LAB
ALBUMIN SERPL-MCNC: 3.8 G/DL (ref 3.4–5)
ALBUMIN/GLOB SERPL: 1.1 {RATIO} (ref 0.8–1.7)
ALP SERPL-CCNC: 105 U/L (ref 45–117)
ALT SERPL-CCNC: 19 U/L (ref 13–56)
ANION GAP SERPL CALC-SCNC: 11 MMOL/L (ref 3–18)
APPEARANCE UR: CLEAR
AST SERPL-CCNC: 16 U/L (ref 15–37)
ATRIAL RATE: 96 BPM
BACTERIA URNS QL MICRO: NEGATIVE /HPF
BASOPHILS # BLD: 0.1 K/UL (ref 0–0.1)
BASOPHILS NFR BLD: 1 % (ref 0–2)
BILIRUB SERPL-MCNC: 0.8 MG/DL (ref 0.2–1)
BILIRUB UR QL: NEGATIVE
BUN SERPL-MCNC: 16 MG/DL (ref 7–18)
BUN/CREAT SERPL: 10 (ref 12–20)
CALCIUM SERPL-MCNC: 9.5 MG/DL (ref 8.5–10.1)
CALCULATED P AXIS, ECG09: 38 DEGREES
CALCULATED R AXIS, ECG10: -68 DEGREES
CALCULATED T AXIS, ECG11: 84 DEGREES
CHLORIDE SERPL-SCNC: 103 MMOL/L (ref 100–108)
CK MB CFR SERPL CALC: 2.2 % (ref 0–4)
CK MB CFR SERPL CALC: 2.4 % (ref 0–4)
CK MB SERPL-MCNC: 2.3 NG/ML (ref 5–25)
CK MB SERPL-MCNC: 2.3 NG/ML (ref 5–25)
CK SERPL-CCNC: 105 U/L (ref 26–192)
CK SERPL-CCNC: 94 U/L (ref 26–192)
CO2 SERPL-SCNC: 26 MMOL/L (ref 21–32)
COLOR UR: YELLOW
CREAT SERPL-MCNC: 1.55 MG/DL (ref 0.6–1.3)
DIAGNOSIS, 93000: NORMAL
DIFFERENTIAL METHOD BLD: ABNORMAL
EOSINOPHIL # BLD: 0.3 K/UL (ref 0–0.4)
EOSINOPHIL NFR BLD: 2 % (ref 0–5)
EPITH CASTS URNS QL MICRO: ABNORMAL /LPF (ref 0–5)
ERYTHROCYTE [DISTWIDTH] IN BLOOD BY AUTOMATED COUNT: 15.4 % (ref 11.6–14.5)
EST. AVERAGE GLUCOSE BLD GHB EST-MCNC: 146 MG/DL
GLOBULIN SER CALC-MCNC: 3.6 G/DL (ref 2–4)
GLUCOSE BLD STRIP.AUTO-MCNC: 83 MG/DL (ref 70–110)
GLUCOSE SERPL-MCNC: 114 MG/DL (ref 74–99)
GLUCOSE UR STRIP.AUTO-MCNC: NEGATIVE MG/DL
HBA1C MFR BLD: 6.7 % (ref 4.2–5.6)
HCT VFR BLD AUTO: 41.7 % (ref 35–45)
HGB BLD-MCNC: 14 G/DL (ref 12–16)
HGB UR QL STRIP: NEGATIVE
HYALINE CASTS URNS QL MICRO: ABNORMAL /LPF (ref 0–2)
KETONES UR QL STRIP.AUTO: NEGATIVE MG/DL
LEUKOCYTE ESTERASE UR QL STRIP.AUTO: ABNORMAL
LYMPHOCYTES # BLD: 2.8 K/UL (ref 0.9–3.6)
LYMPHOCYTES NFR BLD: 21 % (ref 21–52)
MAGNESIUM SERPL-MCNC: 1.9 MG/DL (ref 1.6–2.6)
MCH RBC QN AUTO: 28.1 PG (ref 24–34)
MCHC RBC AUTO-ENTMCNC: 33.6 G/DL (ref 31–37)
MCV RBC AUTO: 83.6 FL (ref 74–97)
MONOCYTES # BLD: 0.9 K/UL (ref 0.05–1.2)
MONOCYTES NFR BLD: 7 % (ref 3–10)
MUCOUS THREADS URNS QL MICRO: ABNORMAL /LPF
NEUTS SEG # BLD: 9.2 K/UL (ref 1.8–8)
NEUTS SEG NFR BLD: 69 % (ref 40–73)
NITRITE UR QL STRIP.AUTO: NEGATIVE
P-R INTERVAL, ECG05: 112 MS
PH UR STRIP: 5 [PH] (ref 5–8)
PLATELET # BLD AUTO: 325 K/UL (ref 135–420)
PMV BLD AUTO: 9.5 FL (ref 9.2–11.8)
POTASSIUM SERPL-SCNC: 3.8 MMOL/L (ref 3.5–5.5)
PROT SERPL-MCNC: 7.4 G/DL (ref 6.4–8.2)
PROT UR STRIP-MCNC: NEGATIVE MG/DL
Q-T INTERVAL, ECG07: 384 MS
QRS DURATION, ECG06: 104 MS
QTC CALCULATION (BEZET), ECG08: 485 MS
RBC # BLD AUTO: 4.99 M/UL (ref 4.2–5.3)
RBC #/AREA URNS HPF: ABNORMAL /HPF (ref 0–5)
SODIUM SERPL-SCNC: 140 MMOL/L (ref 136–145)
SP GR UR REFRACTOMETRY: <1.005 (ref 1–1.03)
TROPONIN I SERPL-MCNC: 0.15 NG/ML (ref 0–0.04)
TROPONIN I SERPL-MCNC: 0.27 NG/ML (ref 0–0.04)
UROBILINOGEN UR QL STRIP.AUTO: 0.2 EU/DL (ref 0.2–1)
VENTRICULAR RATE, ECG03: 96 BPM
WBC # BLD AUTO: 13.1 K/UL (ref 4.6–13.2)
WBC URNS QL MICRO: ABNORMAL /HPF (ref 0–4)

## 2018-07-29 PROCEDURE — 85025 COMPLETE CBC W/AUTO DIFF WBC: CPT | Performed by: PHYSICIAN ASSISTANT

## 2018-07-29 PROCEDURE — 99285 EMERGENCY DEPT VISIT HI MDM: CPT

## 2018-07-29 PROCEDURE — 82550 ASSAY OF CK (CPK): CPT | Performed by: PHYSICIAN ASSISTANT

## 2018-07-29 PROCEDURE — 93005 ELECTROCARDIOGRAM TRACING: CPT

## 2018-07-29 PROCEDURE — 81001 URINALYSIS AUTO W/SCOPE: CPT | Performed by: FAMILY MEDICINE

## 2018-07-29 PROCEDURE — 82962 GLUCOSE BLOOD TEST: CPT

## 2018-07-29 PROCEDURE — 36415 COLL VENOUS BLD VENIPUNCTURE: CPT | Performed by: FAMILY MEDICINE

## 2018-07-29 PROCEDURE — 71045 X-RAY EXAM CHEST 1 VIEW: CPT

## 2018-07-29 PROCEDURE — 83735 ASSAY OF MAGNESIUM: CPT | Performed by: EMERGENCY MEDICINE

## 2018-07-29 PROCEDURE — 65270000029 HC RM PRIVATE

## 2018-07-29 PROCEDURE — 74011250637 HC RX REV CODE- 250/637: Performed by: FAMILY MEDICINE

## 2018-07-29 PROCEDURE — 80053 COMPREHEN METABOLIC PANEL: CPT | Performed by: PHYSICIAN ASSISTANT

## 2018-07-29 PROCEDURE — 74011250636 HC RX REV CODE- 250/636: Performed by: EMERGENCY MEDICINE

## 2018-07-29 PROCEDURE — 74011250637 HC RX REV CODE- 250/637: Performed by: EMERGENCY MEDICINE

## 2018-07-29 PROCEDURE — 83036 HEMOGLOBIN GLYCOSYLATED A1C: CPT | Performed by: FAMILY MEDICINE

## 2018-07-29 RX ORDER — INSULIN LISPRO 100 [IU]/ML
INJECTION, SOLUTION INTRAVENOUS; SUBCUTANEOUS
Status: DISCONTINUED | OUTPATIENT
Start: 2018-07-29 | End: 2018-07-31 | Stop reason: HOSPADM

## 2018-07-29 RX ORDER — LORATADINE 10 MG/1
10 TABLET ORAL
COMMUNITY
End: 2021-06-14

## 2018-07-29 RX ORDER — MAGNESIUM SULFATE HEPTAHYDRATE 40 MG/ML
2 INJECTION, SOLUTION INTRAVENOUS
Status: COMPLETED | OUTPATIENT
Start: 2018-07-29 | End: 2018-07-29

## 2018-07-29 RX ORDER — MONTELUKAST SODIUM 10 MG/1
10 TABLET ORAL DAILY
Status: DISCONTINUED | OUTPATIENT
Start: 2018-07-30 | End: 2018-07-31 | Stop reason: HOSPADM

## 2018-07-29 RX ORDER — MAGNESIUM SULFATE 100 %
4 CRYSTALS MISCELLANEOUS AS NEEDED
Status: DISCONTINUED | OUTPATIENT
Start: 2018-07-29 | End: 2018-07-31 | Stop reason: HOSPADM

## 2018-07-29 RX ORDER — ATORVASTATIN CALCIUM 40 MG/1
40 TABLET, FILM COATED ORAL DAILY
Status: DISCONTINUED | OUTPATIENT
Start: 2018-07-30 | End: 2018-07-31 | Stop reason: HOSPADM

## 2018-07-29 RX ORDER — METOPROLOL TARTRATE 50 MG/1
100 TABLET ORAL EVERY 12 HOURS
Status: DISCONTINUED | OUTPATIENT
Start: 2018-07-29 | End: 2018-07-31 | Stop reason: HOSPADM

## 2018-07-29 RX ORDER — POTASSIUM CHLORIDE 20 MEQ/1
40 TABLET, EXTENDED RELEASE ORAL
Status: COMPLETED | OUTPATIENT
Start: 2018-07-29 | End: 2018-07-29

## 2018-07-29 RX ORDER — DEXTROSE 50 % IN WATER (D50W) INTRAVENOUS SYRINGE
25-50 AS NEEDED
Status: DISCONTINUED | OUTPATIENT
Start: 2018-07-29 | End: 2018-07-31 | Stop reason: HOSPADM

## 2018-07-29 RX ORDER — LORATADINE 10 MG/1
10 TABLET ORAL
Status: DISCONTINUED | OUTPATIENT
Start: 2018-07-29 | End: 2018-07-30 | Stop reason: SDUPTHER

## 2018-07-29 RX ORDER — GUAIFENESIN 100 MG/5ML
324 LIQUID (ML) ORAL
Status: COMPLETED | OUTPATIENT
Start: 2018-07-29 | End: 2018-07-29

## 2018-07-29 RX ORDER — ESCITALOPRAM OXALATE 20 MG/1
20 TABLET ORAL DAILY
Status: DISCONTINUED | OUTPATIENT
Start: 2018-07-30 | End: 2018-07-31 | Stop reason: HOSPADM

## 2018-07-29 RX ORDER — ACETAMINOPHEN 325 MG/1
650 TABLET ORAL
Status: DISCONTINUED | OUTPATIENT
Start: 2018-07-29 | End: 2018-07-31 | Stop reason: HOSPADM

## 2018-07-29 RX ORDER — MONTELUKAST SODIUM 10 MG/1
10 TABLET ORAL DAILY
COMMUNITY
End: 2018-08-28 | Stop reason: ALTCHOICE

## 2018-07-29 RX ORDER — EXEMESTANE 25 MG/1
25 TABLET ORAL DAILY
Status: DISCONTINUED | OUTPATIENT
Start: 2018-07-30 | End: 2018-07-31 | Stop reason: HOSPADM

## 2018-07-29 RX ADMIN — METOPROLOL TARTRATE 100 MG: 50 TABLET ORAL at 21:37

## 2018-07-29 RX ADMIN — ASPIRIN 81 MG CHEWABLE TABLET 324 MG: 81 TABLET CHEWABLE at 15:39

## 2018-07-29 RX ADMIN — MAGNESIUM SULFATE IN WATER 2 G: 40 INJECTION, SOLUTION INTRAVENOUS at 18:28

## 2018-07-29 RX ADMIN — APIXABAN 5 MG: 5 TABLET, FILM COATED ORAL at 21:37

## 2018-07-29 RX ADMIN — POTASSIUM CHLORIDE 40 MEQ: 20 TABLET, EXTENDED RELEASE ORAL at 18:28

## 2018-07-29 NOTE — PROGRESS NOTES
Patient called with AICD shock. She had episode a couple weeks ago as well. She also describes recent pneumonia/bronchitis symptoms with productive cough and dyspnea that seem to be worsening. I advised to go to ER but she seemed hesitant. No chest pain. I discussed risk of sudden death. Once again I advised ER evaluation. She will make her decision.

## 2018-07-29 NOTE — ED NOTES
Patient states \" I want to go home the other girl got a bed before me, Im going to leave\" Md spoke with patient to stay. Patient restless

## 2018-07-29 NOTE — CONSULTS
[facility] - Consult Note    Consultation request by Raleigh Paulino MD for advice/opinion related to evaluating AICD discharge. Date of  Admission: 7/29/2018  2:09 PM   Primary Care Physician:  Raleigh Paulino MD     Assessment:     -AICD discharge. Review of device suggests A.fib/RVR. Similar episode 2 weeks ago. She was switched from lopressor to Cardizem 240 at that time. -h/o paroxysmal atrial fibrillation on Eliquis as well as lopressor 100 mg BID and recently started Cardizem 240 mg daily  -Indeterminate troponin of 0.2, unlikely ACS  -Possible bronchitis, recently treated with antbx, h/o recurrent bronchitis in the past followed by Dr. Jono Potter  -Clinical dehydration, elevated Cr of 1.55 at admission  -Medtronic bivaicd. Placed 2008, changeout 2012 and last April 2017. Initially placed for primary prevention and heart failure at that time. -h/o transient NICM with EF 25-30% in 2008, 50% by echo July 2016. Cath 2008 without critical disease. No recent echo. -Chronic class II diastolic heart failure, compensated, not on diuretics as outpatient  -h/o DVT May 2016 on Eliquis  -Diabetes  -Remote breast cancer  -Recent melena, s/p EGD/colonscopy 3/7/17  -Life expectancy > 1 year  -Baseline LBBB with QRS > 120 msec  -Chronic BB therapy  -Unable to tolerate ARB/ACEI due to hypotension  -Retired nurse    Primary cardiologist Dr. Marti Schwartz:     Patient converted to Salix SABA Arechiga Monitor on telemetry , serial enzymes. Continue Eliquis along with metoprolol and cardizem. Echo tomorrow. Pending course will discuss need for starting sotalol, digoxin after discussing with Dr. Danish Powers. History of Present Illness: This is a 67 y.o. female admitted for Ventricular tachyarrhythmia ([facility] [address] Patient complains of:    AICD discharge earlier today around noon. Also had discharge about 2 weeks ago. Occurred while sitting on toilet urinating.   Also with cough, left sided chest pain, now resolved. Mild dyspnea. She was started on Cardizem a couple weeks ago for her last AICD shock which appeared to be A.fib/RVR. Review of Symptoms:  Except as stated above include:  Constitutional:  Negative  Ears, nose, throat:  Negative  Respiratory:  Cough, dyspnea  Cardiovascular:  Palpitations, aicd discharge  Gastrointestinal: negative  Genitourinary:  negative  Musculoskeletal:  Negative  Neurological:  Negative  Dermatological:  Negative  Hematological: Negative  Endocrinological: Negative  Allergy: Negative  Psychological:  Negative       Past Medical History:     Past Medical History:   Diagnosis Date    CAD (coronary artery disease)     cardiomyopathy,CHF,,Cardiac Cath, pacemaker/defib.  Cancer [facility]) 2013    breast    Cardiac catheterization 02/27/2008    Patent coronary arteries. LVEDP 13 mmHg. EF 25-30%. Global hypk.  Cardiac echocardiogram 07/07/2016    EF 50% (prev 60-65% on study of 4/16/12). No WMA. Gr 1 DDfx. Normal RVSP.  Cardiac nuclear imaging test 05/31/2013    No evidence of ongoing ischemia or prior infarction. EF 60%. No RWMA. Mild dyssynchrony of inferior base & mid/distal septum likely c/w pacemaker. Nondiagnostic EKG on pharm stress test due to V-pacing.  Cardiovascular abdominal aorta duplex 09/23/2015    Fusiform AAA in prox & mid portions of abdom Ao.     Chronic lung disease     COPD (chronic obstructive pulmonary disease) (HCC) chronic bronchitis    Coronary artery disease Feb. 2008    Possible CAD with nonischemic dilated cardiomyopathy/EF 25%/ improved EF up to 56% by echocardiogram.    Coronary artery disease     Diabetes mellitus ([address]     GERD (gastroesophageal reflux disease)     Heart failure (HCC)     Heart murmur     Hemangioma of liver     Hx of cardiomyopathy ([address]     Hypercholesterolemia     Hypertension     Left bundle branch block     Phlebitis     PVD (peripheral vascular disease) ([address]     Renal calculi 1999    Thromboembolus ([address]     below knee in R leg         Social History:     Social History     Social History    Marital status:      Spouse name: N/A    Number of children: N/A    Years of education: N/A     Social History Main Topics    Smoking status: Former Smoker     Packs/day: 1.50     Years: 20.00     Types: Cigarettes     Quit date: 2/23/1988    Smokeless tobacco: Never Used    Alcohol use No    Drug use: No    Sexual activity: Not on file     Other Topics Concern    Not on file     Social History Narrative        Family History:     Family History   Problem Relation Age of Onset    Cancer Mother      Cancer of the breast    Stroke Mother     Cancer Father     Heart Disease Father      CHF    COPD Brother     Heart Attack Brother      Multiple    Heart Disease Brother      CHF        Medications: Allergies   Allergen Reactions    Lisinopril Rash     Hypertensive crisis    Tetracycline Anaphylaxis, Rash and Swelling    Hibiclens [Chlorhexidine Gluconate] Other (comments)     Turned red and BP drop low.  Other Medication Rash and Swelling     -Myacins        Current Facility-Administered Medications   Medication Dose Route Frequency    magnesium sulfate 2 g/50 ml IVPB (premix or compounded)  2 g IntraVENous NOW    potassium chloride (K-DUR, KLOR-CON) SR tablet 40 mEq  40 mEq Oral NOW     Current Outpatient Prescriptions   Medication Sig    dilTIAZem CD (CARDIZEM CD) 240 mg ER capsule Take 1 Cap by mouth daily.  INCRUSE ELLIPTA 62.5 mcg/actuation inhaler Take 1 Puff by inhalation daily.  metoprolol tartrate (LOPRESSOR) 100 mg IR tablet TAKE ONE TABLET BY MOUTH TWICE DAILY    atorvastatin (LIPITOR) 40 mg tablet Take 1 Tab by mouth daily.  MULTIVIT-MIN/IRON/FOLIC/LUTEIN (CENTRUM SILVER WOMEN PO) Take  by mouth.  ELIQUIS 5 mg tablet Take 1 Tab by mouth two (2) times a day.  escitalopram oxalate (LEXAPRO) 20 mg tablet Take 20 mg by mouth daily.     metFORMIN ER (GLUCOPHAGE XR) 500 mg tablet Take 500 mg by mouth two (2) times a day.  exemestane (AROMASIN) 25 mg tablet Take 25 mg by mouth daily. Physical Exam:     Visit Vitals    BP 91/69    Pulse 84    Temp 96.9 °F (36.1 °C)    Resp 17    Ht 5' 5\" (1.651 m)    Wt 95.3 kg (210 lb)    SpO2 97%    BMI 34.95 kg/m2     BP Readings from Last 3 Encounters:   07/29/18 91/69   03/13/18 132/90   02/20/18 118/80     Pulse Readings from Last 3 Encounters:   07/29/18 84   03/13/18 69   02/20/18 60     Wt Readings from Last 3 Encounters:   07/29/18 95.3 kg (210 lb)   03/13/18 98 kg (216 lb)   02/20/18 96.6 kg (213 lb)       General:  alert, cooperative, no distress, appears stated age  Neck:  nontender  Lungs:  decreased  Heart:  regular rate and rhythm, S1, S2 normal, no murmur, click, rub or gallop, aicd pocket intact  Abdomen:  abdomen is soft without significant tenderness, masses, organomegaly or guarding  Extremities:  extremities normal, atraumatic, no cyanosis or edema  Skin: Warm and dry.  no hyperpigmentation, vitiligo, or suspicious lesions  Neuro: alert, oriented x3, affect appropriate, no focal neurological deficits, moves all extremities well, no involuntary movements, reflexes at knee and ankle intact  Psych: non focal     Data Review:     Recent Labs      07/29/18   1430   WBC  13.1   HGB  14.0   HCT  41.7   PLT  325     Recent Labs      07/29/18   1430   NA  140   K  3.8   CL  103   CO2  26   GLU  114*   BUN  16   CREA  1.55*   CA  9.5   MG  1.9   ALB  3.8   SGOT  16   ALT  19       Results for orders placed or performed during the hospital encounter of 07/29/18   EKG, 12 LEAD, INITIAL   Result Value Ref Range    Ventricular Rate 96 BPM    Atrial Rate 96 BPM    P-R Interval 112 ms    QRS Duration 104 ms    Q-T Interval 384 ms    QTC Calculation (Bezet) 485 ms    Calculated P Axis 38 degrees    Calculated R Axis -68 degrees    Calculated T Axis 84 degrees    Diagnosis       Normal sinus rhythm  Biventricular pacing    Confirmed by Estevan Rodriguez MD, Yaya Giles ([id number]) on 7/29/2018 5:37:23 PM     Results for orders placed or performed in visit on 02/20/18   AMB POC EKG ROUTINE W/ 12 LEADS, INTER & REP    Narrative    EKG: unchanged from previous tracings, 1:1 Biv.pacing     Results for orders placed or performed in visit on 12/10/14   PACEMAKER CHECK    Impression    Bi-V paced - 100%; A-sensed - 59%; A-paced - 41%; V-sensed -  <0.1%;  Lead impedances and threshold WNL; 1 monitored episode of  SVT/VT lasting 24 seconds with an average V rate of 158 bpm       All Cardiac Markers in the last 24 hours:    Lab Results   Component Value Date/Time    CPK 94 07/29/2018 02:30 PM    CKMB 2.3 07/29/2018 02:30 PM    CKND1 2.4 07/29/2018 02:30 PM    TROIQ 0.27 (H) 07/29/2018 02:30 PM       Last Lipid:    Lab Results   Component Value Date/Time    Cholesterol, total 187 10/05/2012 12:00 AM    HDL Cholesterol 49 10/05/2012 12:00 AM    LDL, calculated 110 (H) 10/05/2012 12:00 AM    Triglyceride 140 10/05/2012 12:00 AM       Signed By: Sary Oro MD     July 29, 2018

## 2018-07-29 NOTE — IP AVS SNAPSHOT
[address] [id number] [address] Patient: Rowena Aj MRN: [id number] :1945 About your hospitalization You were admitted on:  2018 You last received care in the:  [facility] [address] You were discharged on:  2018 Why you were hospitalized Your primary diagnosis was:  Not on File Your diagnoses also included:  Ventricular Tachyarrhythmia (Hcc), Hypertension, Gerd (Gastroesophageal Reflux Disease), Left Bundle Branch Block, Biventricular Implantable Cardioverter-Defibrillator In Situ, Paf (Paroxysmal Atrial Fibrillation) (Hcc), Bronchitis, John (Acute Kidney Injury) (Hcc) Follow-up Information Follow up With Details Comments Contact Info Virginia House MD On 8/3/2018 @ 9:30 [address] [facility]. [facility] [address] 
[phone] Jesus Steve MD On 2018 @ 2:00; Office will call patient and inform luis felipe if a sooner appointment beecomes available. [address] [facility] [address] [place] [address] 
[phone] Lily Estrada MD   [address] [place] [id number] [phone] Your Scheduled Appointments 2018  2:00 PM EDT Follow Up with Jesus Steve MD  
[facility] \[facility]\"" ([facility]) [place] Caitlin[place] [phone] Discharge Orders None A check corazon indicates which time of day the medication should be taken. My Medications START taking these medications Instructions Each Dose to Equal  
 Morning Noon Evening Bedtime  
 albuterol sulfate 90 mcg/actuation Aepb Commonly known as:  Kurt Spring Your last dose was: Your next dose is: Take 2 Puffs by inhalation every four (4) hours as needed. 2 Puff  
    
   
   
   
  
 azithromycin 500 mg Tab Commonly known as:  Lobo Holm Your last dose was: Your next dose is: Take 1 Tab by mouth daily for 5 days. 500 mg  
    
   
   
   
  
 fluticasone 50 mcg/actuation nasal spray Commonly known as:  Chelsea Wilcox Your last dose was: Your next dose is: 2 Sprays by Both Nostrils route daily. 2 Spray  
    
   
   
   
  
 fluticasone-vilanterol 100-25 mcg/dose inhaler Commonly known as:  BREO ELLIPTA Your last dose was: Your next dose is: Take 1 Puff by inhalation daily. 1 Puff  
    
   
   
   
  
 predniSONE 20 mg tablet Commonly known as:  Earnestine Garcia Start taking on:  8/1/2018 Your last dose was: Your next dose is: Take 1 Tab by mouth daily (with breakfast). Start 8/1/18, last dose 8/3/18  
 20 mg  
    
   
   
   
  
 umeclidinium 62.5 mcg/actuation inhaler Commonly known as:  INCRUSE ELLIPTA Your last dose was: Your next dose is: Take 1 Puff by inhalation daily. 1 Puff CONTINUE taking these medications Instructions Each Dose to Equal  
 Morning Noon Evening Bedtime  
 atorvastatin 40 mg tablet Commonly known as:  LIPITOR Your last dose was: Your next dose is: Take 1 Tab by mouth daily. 40 mg CARDIZEM  mg ER capsule Generic drug:  dilTIAZem CD Your last dose was: Your next dose is: Take 240 mg by mouth daily. 240 mg CENTRUM SILVER WOMEN PO Your last dose was: Your next dose is: Take  by mouth. CLARITIN 10 mg tablet Generic drug:  loratadine Your last dose was: Your next dose is: Take 10 mg by mouth. 10 mg  
    
   
   
   
  
 ELIQUIS 5 mg tablet Generic drug:  apixaban Your last dose was: Your next dose is: Take 1 Tab by mouth two (2) times a day. 5 mg  
    
   
   
   
  
 escitalopram oxalate 20 mg tablet Commonly known as:  Donis Warner Your last dose was: Your next dose is: Take 20 mg by mouth daily. 20 mg  
    
   
   
   
  
 exemestane 25 mg tablet Commonly known as:  Kat Costa Your last dose was: Your next dose is: Take 25 mg by mouth daily. 25 mg  
    
   
   
   
  
 metoprolol tartrate 100 mg IR tablet Commonly known as:  LOPRESSOR Your last dose was: Your next dose is: TAKE ONE TABLET BY MOUTH TWICE DAILY  
     
   
   
   
  
 montelukast 10 mg tablet Commonly known as:  SINGULAIR Your last dose was: Your next dose is: Take 10 mg by mouth daily. Indications: Allergic Rhinitis 10 mg  
    
   
   
   
  
  
STOP taking these medications   
 metFORMIN  mg tablet Commonly known as:  GLUCOPHAGE XR Where to Get Your Medications Information on where to get these meds will be given to you by the nurse or doctor. ! Ask your nurse or doctor about these medications  
  albuterol sulfate 90 mcg/actuation Aepb  
 azithromycin 500 mg Tab  
 fluticasone 50 mcg/actuation nasal spray  
 fluticasone-vilanterol 100-25 mcg/dose inhaler  
 predniSONE 20 mg tablet  
 umeclidinium 62.5 mcg/actuation inhaler Discharge Instructions Atrial Fibrillation: Care Instructions Your Care Instructions Atrial fibrillation is an irregular and often fast heartbeat. Treating this condition is important for several reasons. It can cause blood clots, which can travel from your heart to your brain and cause a stroke. If you have a fast heartbeat, you may feel lightheaded, dizzy, and weak. An irregular heartbeat can also increase your risk for heart failure.  
Atrial fibrillation is often the result of another heart condition, such as high blood pressure or coronary artery disease. Making changes to improve your heart condition will help you stay healthy and active. Follow-up care is a key part of your treatment and safety. Be sure to make and go to all appointments, and call your doctor if you are having problems. It's also a good idea to know your test results and keep a list of the medicines you take. How can you care for yourself at home? Medicines 
  · Take your medicines exactly as prescribed. Call your doctor if you think you are having a problem with your medicine. You will get more details on the specific medicines your doctor prescribes.  
  · If your doctor has given you a blood thinner to prevent a stroke, be sure you get instructions about how to take your medicine safely. Blood thinners can cause serious bleeding problems.  
  · Do not take any vitamins, over-the-counter drugs, or herbal products without talking to your doctor first.  
 Lifestyle changes 
  · Do not smoke. Smoking can increase your chance of a stroke and heart attack. If you need help quitting, talk to your doctor about stop-smoking programs and medicines. These can increase your chances of quitting for good.  
  · Eat a heart-healthy diet.  
  · Stay at a healthy weight. Lose weight if you need to.  
  · Limit alcohol to 2 drinks a day for men and 1 drink a day for women. Too much alcohol can cause health problems.  
  · Avoid colds and flu. Get a pneumococcal vaccine shot. If you have had one before, ask your doctor whether you need another dose. Get a flu shot every year. If you must be around people with colds or flu, wash your hands often. Activity 
  · If your doctor recommends it, get more exercise. Walking is a good choice. Bit by bit, increase the amount you walk every day. Try for at least 30 minutes on most days of the week. You also may want to swim, bike, or do other activities.  Your doctor may suggest that you join a cardiac rehabilitation program so that you can have help increasing your physical activity safely.  
  · Start light exercise if your doctor says it is okay. Even a small amount will help you get stronger, have more energy, and manage stress. Walking is an easy way to get exercise. Start out by walking a little more than you did in the hospital. Gradually increase the amount you walk.  
  · When you exercise, watch for signs that your heart is working too hard. You are pushing too hard if you cannot talk while you are exercising. If you become short of breath or dizzy or have chest pain, sit down and rest immediately.  
  · Check your pulse regularly. Place two fingers on the artery at the palm side of your wrist, in line with your thumb. If your heartbeat seems uneven or fast, talk to your doctor. When should you call for help? Call 911 anytime you think you may need emergency care. For example, call if: 
  · You have symptoms of a heart attack. These may include: ¨ Chest pain or pressure, or a strange feeling in the chest. 
¨ Sweating. ¨ Shortness of breath. ¨ Nausea or vomiting. ¨ Pain, pressure, or a strange feeling in the back, neck, jaw, or upper belly or in one or both shoulders or arms. ¨ Lightheadedness or sudden weakness. ¨ A fast or irregular heartbeat. After you call 911, the  may tell you to chew 1 adult-strength or 2 to 4 low-dose aspirin. Wait for an ambulance. Do not try to drive yourself.  
  · You have symptoms of a stroke. These may include: 
¨ Sudden numbness, tingling, weakness, or loss of movement in your face, arm, or leg, especially on only one side of your body. ¨ Sudden vision changes. ¨ Sudden trouble speaking. ¨ Sudden confusion or trouble understanding simple statements. ¨ Sudden problems with walking or balance. ¨ A sudden, severe headache that is different from past headaches.  
  · You passed out (lost consciousness).  Call your doctor now or seek immediate medical care if: 
  · You have new or increased shortness of breath.  
  · You feel dizzy or lightheaded, or you feel like you may faint.  
  · Your heart rate becomes irregular.  
  · You can feel your heart flutter in your chest or skip heartbeats. Tell your doctor if these symptoms are new or worse.  
 Watch closely for changes in your health, and be sure to contact your doctor if you have any problems. Where can you learn more? Go to http://josr-candido.info/. Enter U020 in the search box to learn more about \"Atrial Fibrillation: Care Instructions. \" Current as of: December 6, 2017 Content Version: 11.7 © 7935-1247 [employer]. Care instructions adapted under license by [employer] (which disclaims liability or warranty for this information). If you have questions about a medical condition or this instruction, always ask your healthcare professional. [address] any warranty or liability for your use of this information. Learning About Radiofrequency Treatments for GERD What are radiofrequency treatments for GERD? Radiofrequency (RF) treatments (like the Stretta procedure) mean that a doctor uses heat to treat the wall of the esophagus. This can help if you have irritation in your esophagus from gastroesophageal reflux disease (GERD). GERD is the backward flow of stomach acid into the esophagus, the tube that carries food and liquid to your stomach. A one-way valve prevents stomach acid from moving up into this tube. When you have GERD, this valve does not close tightly enough. If you have mild GERD symptoms, such as heartburn, you may be able to control the problem with over-the-counter or prescription medicine. Changing your diet, losing weight, and making other lifestyle changes can also help reduce symptoms. If those treatments don't work, your doctor might recommend RF treatments. How do radiofrequency treatments help with GERD? When you have RF treatments, a doctor uses heat to treat tissue in the wall of the esophagus. As the tissue heals, you may have fewer GERD symptoms. The symptoms may even go away. Before the procedure, you will get medicines through a needle in your vein (IV) in your arm or hand. These medicines reduce pain and will make you feel relaxed and drowsy. Your throat will also be numbed. You may not remember much about the treatment. During the procedure, the doctor will use a tool called an endoscope, or scope. It's a thin, flexible, lighted viewing tool. It goes into the mouth and down the throat. Your doctor can use it to see the area that will be treated. Your doctor will find the area where the esophagus meets the stomach and will apply heat. The treatment may take about half an hour. What can you expect after radiofrequency treatments? After the treatment, your esophagus may feel tight or narrow. You may have pain in your chest. You may also feel bloated or have trouble swallowing. You will be observed for 1 to 2 hours after the treatment until the medicines wear off. You should not eat or drink until your throat is no longer numb. When you recover, you can go home. You will not be able to drive or operate machinery for 12 hours after the test. Your doctor will tell you when you can go back to your usual diet and activities. Do not drink alcohol for 12 to 24 hours after the test. 
You may still need to treat some symptoms of GERD. Your doctor will give you information about that. Follow-up care is a key part of your treatment and safety. Be sure to make and go to all appointments, and call your doctor if you are having problems. It's also a good idea to know your test results and keep a list of the medicines you take. Where can you learn more? Go to http://josr-candido.info/. Enter G818 in the search box to learn more about \"Learning About Radiofrequency Treatments for GERD. \" 
Current as of: July 13, 2017 Content Version: 11.7 © 7150-8078 [employer]. Care instructions adapted under license by [employer] (which disclaims liability or warranty for this information). If you have questions about a medical condition or this instruction, always ask your healthcare professional. [address] any warranty or liability for your use of this information. Acute High Blood Pressure: Care Instructions Your Care Instructions Acute high blood pressure is very high blood pressure. It's a serious problem. Very high blood pressure can damage your brain, heart, eyes, and kidneys. You may have been given medicines to lower your blood pressure. You may have gotten them as pills or through a needle in one of your veins. This is called an IV. And maybe you were given other medicines too. These can be needed when high blood pressure causes other problems. To keep your blood pressure at a lower level, you may need to make healthy lifestyle changes. And you will probably need to take medicines. Be sure to follow up with your doctor about your blood pressure and what you can do about it. Follow-up care is a key part of your treatment and safety. Be sure to make and go to all appointments, and call your doctor if you are having problems. It's also a good idea to know your test results and keep a list of the medicines you take. How can you care for yourself at home? · See your doctor as often as he or she recommends. This is to make sure your blood pressure is under control. You will probably go at least 2 times a year. But it may be more often at first. 
· Take your blood pressure medicine exactly as prescribed. You may take one or more types.  They include diuretics, beta-blockers, ACE inhibitors, calcium channel blockers, and angiotensin II receptor blockers. Call your doctor if you think you are having a problem with your medicine. · If you take blood pressure medicine, talk to your doctor before you take decongestants or anti-inflammatory medicine, such as ibuprofen. These can raise blood pressure. · Learn how to check your blood pressure at home. Check it often. · Ask your doctor if it's okay to drink alcohol. · Talk to your doctor about lifestyle changes that can help blood pressure. These include being active and not smoking. When should you call for help? Call 911 anytime you think you may need emergency care. This may mean having symptoms that suggest that your blood pressure is causing a serious heart or blood vessel problem. Your blood pressure may be over 180/110. 
 For example, call 911 if: 
  · You have symptoms of a heart attack. These may include: ¨ Chest pain or pressure, or a strange feeling in the chest. 
¨ Sweating. ¨ Shortness of breath. ¨ Nausea or vomiting. ¨ Pain, pressure, or a strange feeling in the back, neck, jaw, or upper belly or in one or both shoulders or arms. ¨ Lightheadedness or sudden weakness. ¨ A fast or irregular heartbeat.  
  · You have symptoms of a stroke. These may include: 
¨ Sudden numbness, tingling, weakness, or loss of movement in your face, arm, or leg, especially on only one side of your body. ¨ Sudden vision changes. ¨ Sudden trouble speaking. ¨ Sudden confusion or trouble understanding simple statements. ¨ Sudden problems with walking or balance. ¨ A sudden, severe headache that is different from past headaches.  
  · You have severe back or belly pain.  
 Do not wait until your blood pressure comes down on its own. Get help right away. 
 Call your doctor now or seek immediate care if: 
  · Your blood pressure is much higher than normal (such as 180/110 or higher), but you don't have symptoms.   · You think high blood pressure is causing symptoms, such as: ¨ Severe headache. ¨ Blurry vision.  
 Watch closely for changes in your health, and be sure to contact your doctor if: 
  · Your blood pressure measures 140/90 or higher at least 2 times. That means the top number is 140 or higher or the bottom number is 90 or higher, or both.  
  · You think you may be having side effects from your blood pressure medicine.  
  · Your blood pressure is usually normal, but it goes above normal at least 2 times. Where can you learn more? Go to http://josr-candido.info/. Enter Z479 in the search box to learn more about \"Acute High Blood Pressure: Care Instructions. \" Current as of: May 10, 2017 Content Version: 11.7 © 2878-2787 [employer]. Care instructions adapted under license by [employer] (which disclaims liability or warranty for this information). If you have questions about a medical condition or this instruction, always ask your healthcare professional. [place] [address] any warranty or liability for your use of this information. Acute Kidney Injury: Care Instructions Your Care Instructions Acute kidney injury (GREY) is a sudden decrease in kidney function. This can happen over a period of hours, days or, in some cases, weeks. GREY used to be called acute renal failure, but kidney failure doesn't always happen with GREY. Common causes of GREY are dehydration, blood loss, and medicines. When GREY happens, the kidneys have trouble removing waste and excess fluids from the body. The waste and fluids build up and become harmful. Kidney function may return to normal if the cause of GREY is treated quickly. Your chance of a full recovery depends on what caused the problem, how quickly the cause was treated, and what other medical problems you have.  A machine may be used to help your kidneys remove waste and fluids for a short period of time. This is called dialysis. Follow-up care is a key part of your treatment and safety. Be sure to make and go to all appointments, and call your doctor if you are having problems. It's also a good idea to know your test results and keep a list of the medicines you take. How can you care for yourself at home? · Talk to your doctor about how much fluid you should drink. · Eat a balanced diet. Talk to your doctor or a dietitian about what type of diet may be best for you. You may need to limit sodium, potassium, and phosphorus. · If you need dialysis, follow the instructions and schedule for dialysis that your doctor gives you. · Do not smoke. Smoking can make your condition worse. If you need help quitting, talk to your doctor about stop-smoking programs and medicines. These can increase your chances of quitting for good. · Do not drink alcohol. · Review all of your medicines with your doctor. Do not take any medicines, including nonsteroidal anti-inflammatory drugs (NSAIDs) such as ibuprofen (Advil, Motrin) or naproxen (Aleve), unless your doctor says it is safe for you to do so. · Make sure that anyone treating you for any health problem knows that you have had GREY. When should you call for help? Call 911 anytime you think you may need emergency care. For example, call if: 
  · You passed out (lost consciousness).  
 Call your doctor now or seek immediate medical care if: 
  · You have new or worse nausea and vomiting.  
  · You have much less urine than normal, or you have no urine.  
  · You are feeling confused or cannot think clearly.  
  · You have new or more blood in your urine.  
  · You have new swelling.  
  · You are dizzy or lightheaded, or feel like you may faint.  
 Watch closely for changes in your health, and be sure to contact your doctor if: 
  · You do not get better as expected. Where can you learn more? Go to http://josr-candido.info/. Enter D745 in the search box to learn more about \"Acute Kidney Injury: Care Instructions. \" Current as of: May 12, 2017 Content Version: 11.7 © 9524-4077 [employer]. Care instructions adapted under license by [employer] (which disclaims liability or warranty for this information). If you have questions about a medical condition or this instruction, always ask your healthcare professional. [place] [address] any warranty or liability for your use of this information. Learning About Ventricular Tachycardia What is ventricular tachycardia? Ventricular tachycardia (say \"matthew-TRICK-yuh-ler iigd-hb-FRY-arlene-uh\"), or VT, is a type of fast heart rhythm. It starts in the lower part of the heart (ventricles). Some forms of VT may get worse and lead to ventricular fibrillation. Both conditions can be life-threatening. What causes it? VT may be caused by a heart problem that affects the structure of the heart or the heart muscle. These problems may include a heart attack, a heart defect that you were born with, or inflammation in the heart. Sometimes VT happens after heart surgery. Other heart rhythm problems can also lead to it. Some people with normal hearts have VT. This tends to be less serious. Some medicines, such as those used to treat some types of abnormal heart rhythms, can cause VT. Other causes include electrolyte imbalances and using illegal drugs such as stimulants like cocaine. In some cases, the cause isn't known. What are the symptoms? Symptoms of VT include: 
· Palpitations. This is an uncomfortable awareness of the heart beating very fast or not in a regular way. · Feeling dizzy or lightheaded. · Shortness of breath. · Chest pain or pressure. · Near-fainting or fainting. Symptoms happen because the heart beats too fast. It can't pump enough blood to the rest of the body. How is VT diagnosed? Your doctor will do an exam and ask about your health history. Your doctor will also do an electrocardiogram (EKG, ECG). This is a tracing of the electrical activity of your heart. VT can come and go. It may be hard to capture with an EKG at your doctor's office. So the doctor may want you to wear a heart monitor. It records your heart rhythm over a few days. You may have lab tests and a chest X-ray. Your doctor may also recommend other tests. · An echocardiogram looks at the structure of your heart. · A stress test can show if the heart muscle is getting enough blood or if there are any blockages in the arteries of the heart. · An electrophysiology (EP) study can find specific areas of your heart that may be causing the VT. The results of these tests can help your doctor decide what treatment options you have. How is it treated? Goals of treatment are to: · Prevent an abnormal heartbeat. · Improve your symptoms. · Prevent cardiac arrest (the heart stops beating) and sudden death. To prevent VT and relieve symptoms, you may take heart rhythm medicines. Some people may have a catheter ablation. This procedure destroys small areas of heart tissue that cause the irregular heartbeat. It may make VT happen less often. Or it may stop VT from happening again. Your doctor may recommend a device that can detect a life-threatening abnormal heartbeat and help restore a normal rhythm. This device might be implanted (ICD, or implantable cardioverter-defibrillator) or worn as a vest. 
If you have VT that is not stopping, it is a medical emergency. You may need a shock to try to get your heart back into a normal rhythm. This can be from an automated external defibrillator (AED), by paramedics, or through treatment in an emergency room. A doctor may give you medicines if your condition is stable. Follow-up care is a key part of your treatment and safety.  Be sure to make and go to all appointments, and call your doctor if you are having problems. It's also a good idea to know your test results and keep a list of the medicines you take. Where can you learn more? Go to http://josr-candido.info/. Enter V110 in the search box to learn more about \"Learning About Ventricular Tachycardia. \" Current as of: March 8, 2018 Content Version: 11.7 © 9079-6457 [employer]. Care instructions adapted under license by [employer] (which disclaims liability or warranty for this information). If you have questions about a medical condition or this instruction, always ask your healthcare professional. [address] any warranty or liability for your use of this information. Chronic Obstructive Pulmonary Disease (COPD): Care Instructions Your Care Instructions Chronic obstructive pulmonary disease (COPD) is a general term for a group of lung diseases, including emphysema and chronic bronchitis. People with COPD have decreased airflow in and out of the lungs, which makes it hard to breathe. The airways also can get clogged with thick mucus. Cigarette smoking is a major cause of COPD. Although there is no cure for COPD, you can slow its progress. Following your treatment plan and taking care of yourself can help you feel better and live longer. Follow-up care is a key part of your treatment and safety. Be sure to make and go to all appointments, and call your doctor if you are having problems. It's also a good idea to know your test results and keep a list of the medicines you take. How can you care for yourself at home? 
 Staying healthy 
  · Do not smoke. This is the most important step you can take to prevent more damage to your lungs. If you need help quitting, talk to your doctor about stop-smoking programs and medicines. These can increase your chances of quitting for good.   · Avoid colds and flu. Get a pneumococcal vaccine shot. If you have had one before, ask your doctor whether you need a second dose. Get the flu vaccine every fall. If you must be around people with colds or the flu, wash your hands often.  
  · Avoid secondhand smoke, air pollution, and high altitudes. Also avoid cold, dry air and hot, humid air. Stay at home with your windows closed when air pollution is bad.  
 Medicines and oxygen therapy 
  · Take your medicines exactly as prescribed. Call your doctor if you think you are having a problem with your medicine.  
  · You may be taking medicines such as: ¨ Bronchodilators. These help open your airways and make breathing easier. Bronchodilators are either short-acting (work for 6 to 9 hours) or long-acting (work for 24 hours). You inhale most bronchodilators, so they start to act quickly. Always carry your quick-relief inhaler with you in case you need it while you are away from home. ¨ Corticosteroids (prednisone, budesonide). These reduce airway inflammation. They come in pill or inhaled form. You must take these medicines every day for them to work well.  
  · A spacer may help you get more inhaled medicine to your lungs. Ask your doctor or pharmacist if a spacer is right for you. If it is, ask how to use it properly.  
  · Do not take any vitamins, over-the-counter medicine, or herbal products without talking to your doctor first.  
  · If your doctor prescribed antibiotics, take them as directed. Do not stop taking them just because you feel better. You need to take the full course of antibiotics.  
  · Oxygen therapy boosts the amount of oxygen in your blood and helps you breathe easier. Use the flow rate your doctor has recommended, and do not change it without talking to your doctor first.  
Activity 
  · Get regular exercise. Walking is an easy way to get exercise. Start out slowly, and walk a little more each day.   · Pay attention to your breathing. You are exercising too hard if you cannot talk while you are exercising.  
  · Take short rest breaks when doing household chores and other activities.  
  · Learn breathing methods-such as breathing through pursed lips-to help you become less short of breath.  
  · If your doctor has not set you up with a pulmonary rehabilitation program, talk to him or her about whether rehab is right for you. Rehab includes exercise programs, education about your disease and how to manage it, help with diet and other changes, and emotional support. Diet 
  · Eat regular, healthy meals. Use bronchodilators about 1 hour before you eat to make it easier to eat. Eat several small meals instead of three large ones. Drink beverages at the end of the meal. Avoid foods that are hard to chew.  
  · Eat foods that contain protein so that you do not lose muscle mass.  
  · Talk with your doctor if you gain too much weight or if you lose weight without trying.  
 Mental health 
  · Talk to your family, friends, or a therapist about your feelings. It is normal to feel frightened, angry, hopeless, helpless, and even guilty. Talking openly about bad feelings can help you cope. If these feelings last, talk to your doctor. When should you call for help? Call 911 anytime you think you may need emergency care. For example, call if: 
  · You have severe trouble breathing.  
 Call your doctor now or seek immediate medical care if: 
  · You have new or worse trouble breathing.  
  · You cough up blood.  
  · You have a fever.  
 Watch closely for changes in your health, and be sure to contact your doctor if: 
  · You cough more deeply or more often, especially if you notice more mucus or a change in the color of your mucus.  
  · You have new or worse swelling in your legs or belly.  
  · You are not getting better as expected. Where can you learn more? Go to http://josr-candido.info/. Gabino Bee in the search box to learn more about \"Chronic Obstructive Pulmonary Disease (COPD): Care Instructions. \" Current as of: December 6, 2017 Content Version: 11.7 © 8968-2497 [employer]. Care instructions adapted under license by [employer] (which disclaims liability or warranty for this information). If you have questions about a medical condition or this instruction, always ask your healthcare professional. [place] [address] any warranty or liability for your use of this information. Gastroesophageal Reflux Disease (GERD): Care Instructions Your Care Instructions Gastroesophageal reflux disease (GERD) is the backward flow of stomach acid into the esophagus. The esophagus is the tube that leads from your throat to your stomach. A one-way valve prevents the stomach acid from moving up into this tube. When you have GERD, this valve does not close tightly enough. If you have mild GERD symptoms including heartburn, you may be able to control the problem with antacids or over-the-counter medicine. Changing your diet, losing weight, and making other lifestyle changes can also help reduce symptoms. Follow-up care is a key part of your treatment and safety. Be sure to make and go to all appointments, and call your doctor if you are having problems. It's also a good idea to know your test results and keep a list of the medicines you take. How can you care for yourself at home? · Take your medicines exactly as prescribed. Call your doctor if you think you are having a problem with your medicine. · Your doctor may recommend over-the-counter medicine. For mild or occasional indigestion, antacids, such as Tums, Gaviscon, Mylanta, or Maalox, may help. Your doctor also may recommend over-the-counter acid reducers, such as Pepcid AC, Tagamet HB, Zantac 75, or Prilosec. Read and follow all instructions on the label.  If you use these medicines often, talk with your doctor. · Change your eating habits. ¨ It's best to eat several small meals instead of two or three large meals. ¨ After you eat, wait 2 to 3 hours before you lie down. ¨ Chocolate, mint, and alcohol can make GERD worse. ¨ Spicy foods, foods that have a lot of acid (like tomatoes and oranges), and coffee can make GERD symptoms worse in some people. If your symptoms are worse after you eat a certain food, you may want to stop eating that food to see if your symptoms get better. · Do not smoke or chew tobacco. Smoking can make GERD worse. If you need help quitting, talk to your doctor about stop-smoking programs and medicines. These can increase your chances of quitting for good. · If you have GERD symptoms at night, raise the head of your bed 6 to 8 inches by putting the frame on blocks or placing a foam wedge under the head of your mattress. (Adding extra pillows does not work.) · Do not wear tight clothing around your middle. · Lose weight if you need to. Losing just 5 to 10 pounds can help. When should you call for help? Call your doctor now or seek immediate medical care if: 
  · You have new or different belly pain.  
  · Your stools are black and tarlike or have streaks of blood.  
 Watch closely for changes in your health, and be sure to contact your doctor if: 
  · Your symptoms have not improved after 2 days.  
  · Food seems to catch in your throat or chest.  
Where can you learn more? Go to http://josr-candido.info/. Enter H888 in the search box to learn more about \"Gastroesophageal Reflux Disease (GERD): Care Instructions. \" Current as of: May 12, 2017 Content Version: 11.7 © 7642-1370 [employer]. Care instructions adapted under license by [employer] (which disclaims liability or warranty for this information).  If you have questions about a medical condition or this instruction, always ask your healthcare professional. [employer] disclaims any warranty or liability for your use of this information. Heart Rhythm Problems in Heart Failure: Care Instructions Your Care Instructions A heart rhythm problem, or arrhythmia, is a change in the normal rhythm of your heart. Your heart may beat too fast or too slow or beat with an irregular or skipping rhythm. A change in the heart's rhythm may feel like a really strong heartbeat or a fluttering in your chest. A severe heart rhythm problem can keep the body from getting the blood it needs. This can cause shortness of breath, lightheadedness, and fainting. A heart rhythm problem can make your heart failure worse and increase your chance of dying suddenly. You may take medicine to treat your condition. Your doctor may recommend a pacemaker, an implantable cardioverter-defibrillator (ICD), or a procedure called catheter ablation to destroy small parts of the heart that are causing a rhythm problem. Follow-up care is a key part of your treatment and safety. Be sure to make and go to all appointments, and call your doctor if you are having problems. It's also a good idea to know your test results and keep a list of the medicines you take. How can you care for yourself at home? · Take your medicines exactly as prescribed. Talk to your doctor if you have any problems with your medicines. · If you received a pacemaker or a defibrillator, you will get a fact sheet about it. · Wear a medical alert ID bracelet. You can buy one at most drugstores or order it on the Internet. · Make sure you go to your follow-up appointments. To change your lifestyle · Do not smoke. · Eat a heart-healthy diet. · Do not drink too much alcohol. Also, get enough sleep, and do not overeat. · Ask your doctor whether you can take over-the-counter medicines (such as decongestants). These can make your heart beat fast. 
Be active · Start light exercise if your doctor says you can.  Even a small amount will help you get stronger, have more energy, and manage your stress. · Walk to get exercise easily. Start by walking a little more than you did the day before. Bit by bit, increase the amount you walk. · When you exercise, watch for signs that your heart is working too hard. You are pushing too hard if you cannot talk while you exercise. If you become short of breath or dizzy or have chest pain, sit down and rest. 
· If your doctor has not set you up with a cardiac rehabilitation (rehab) program, talk to him or her about whether that is right for you. Cardiac rehab includes exercise, help with diet and lifestyle changes, and emotional support. It may reduce your risk of future heart problems. · Check your pulse daily. Place two fingers on the artery at the palm side of your wrist, in line with your thumb. If your heartbeat seems uneven, talk to your doctor. When should you call for help? Call 911 if you have symptoms of sudden heart failure, such as: 
  · You have severe trouble breathing.  
  · You cough up pink, foamy mucus.  
  · You have a new irregular or rapid heartbeat.  
 Call 911 if you have symptoms of a heart attack. These may include: 
  · Chest pain or pressure, or a strange feeling in the chest.  
  · Sweating.  
  · Shortness of breath.  
  · Nausea or vomiting.  
  · Pain, pressure, or a strange feeling in the back, neck, jaw, or upper belly or in one or both shoulders or arms.  
  · Lightheadedness or sudden weakness.  
  · A fast or irregular heartbeat.  
 After you call 911, the  may tell you to chew 1 adult-strength or 2 to 4 low-dose aspirin. Wait for an ambulance.  Do not try to drive yourself. 
 Call your doctor now or seek immediate medical care if: 
  · You have new or increased shortness of breath.  
  · You are dizzy or lightheaded, or you feel like you may faint.  
  · You have sudden weight gain, such as more than 2 to 3 pounds in a day or 5 pounds in a week. (Your doctor may suggest a different range of weight gain.)  
  · You have increased swelling in your legs, ankles, or feet.  
  · You are suddenly so tired or weak that you cannot do your usual activities.  
 Watch closely for changes in your health, and be sure to contact your doctor if you develop new symptoms. Where can you learn more? Go to http://josr-candido.info/. Enter N479 in the search box to learn more about \"Heart Rhythm Problems in Heart Failure: Care Instructions. \" Current as of: December 6, 2017 Content Version: 11.7 © 5900-6260 [employer]. Care instructions adapted under license by [employer] (which disclaims liability or warranty for this information). If you have questions about a medical condition or this instruction, always ask your healthcare professional. [place] [address] any warranty or liability for your use of this information. Learning About ICDs (Implantable Cardioverter-Defibrillators) What is an ICD (implantable cardioverter-defibrillator)? An ICD (implantable cardioverter-defibrillator) is a small, battery-powered device. It fixes serious changes in your heartbeat. ICDs are used in people who have had a life-threatening heart rhythm or are at high risk of having one. The ICD is placed under the skin of the chest. It's attached to one or two wires (called leads). Most of the time, these leads go into the heart through a vein. How does an ICD work? An ICD is always checking your heart rate and rhythm. If the ICD detects a life-threatening, rapid heart rhythm, it tries to slow the rhythm back to normal using electrical pulses. If the bad rhythm doesn't stop, the ICD sends an electric shock to the heart. This restores a normal rhythm. The device then goes back to its watchful mode. Some ICDs also can fix a heart rate that is too slow.  The ICD does this without using a shock. It can send out electrical pulses to speed up a heart rate that is too slow. Your doctor will check the ICD regularly to make sure it is working right and isn't causing any problems. Your doctor will also check the battery to see if it needs to be replaced. How is an ICD placed? Your doctor will put the ICD under the skin in your chest during minor surgery. You will likely have medicine to make you feel relaxed and sleepy during the surgery. Your doctor makes a small cut (incision) in your upper chest. He or she puts one or two leads (wires) through the cut. Most of the time, the leads go into a large blood vessel in the upper chest. Then your doctor guides the leads through the blood vessel into your heart. Your doctor connects the leads to the ICD and places it in your chest. Then the incision is closed. Your doctor also programs the ICD. Most people spend the night in the hospital, just to make sure that the device is working and that there are no problems from the surgery. How does it feel to get a shock? The shock from an ICD hurts briefly. People feel it in different ways. It's been described as feeling like a punch in the chest. But the shock is a sign that the ICD is doing its job. It's there to save your life. You won't feel any pain if the ICD uses electrical pulses to fix a heart rate that is too fast or too slow. There's no way to know how often a shock might occur. It might never happen. Not knowing when or if a shock might happen may make you nervous. Knowing what to do if you get shocked can help. Ask your doctor for an action plan. This plan will guide you step-by-step if a shock happens. What else should you know? You can live a normal life with your ICD. Here are a few tips for living well with your ICD. · Avoid strong magnetic and electrical fields. These can keep your device from working right.  Most office equipment and home appliances are safe to use. Check with your doctor about which things you should use with caution and which you should stay away from. · Be sure that any doctor, dentist, or other health professional you see knows that you have an ICD. · Always carry a card that tells what kind of device you have. Wear medical alert jewelry that says you have an ICD. You can buy this at most drugstores. · If you do get a shock, follow your action plan for what to do. · You can lead an active life with an ICD. Ask your doctor what sort of activity and intensity is safe for you. As you plan for your future and your end of life, be sure to include plans for your ICD. You can make the decision to turn off your ICD as part of the medical treatment you want at the end of life. Follow-up care is a key part of your treatment and safety. Be sure to make and go to all appointments, and call your doctor if you are having problems. It's also a good idea to know your test results and keep a list of the medicines you take. Where can you learn more? Go to http://josr-candido.info/. Enter H508 in the search box to learn more about \"Learning About ICDs (Implantable Cardioverter-Defibrillators). \" 
Current as of: December 6, 2017 Content Version: 11.7 © 6961-8683 [employer]. Care instructions adapted under license by [employer] (which disclaims liability or warranty for this information). If you have questions about a medical condition or this instruction, always ask your healthcare professional. [place] [address] any warranty or liability for your use of this information. Low Sodium Diet (2,000 Milligram): Care Instructions Your Care Instructions Too much sodium causes your body to hold on to extra water. This can raise your blood pressure and force your heart and kidneys to work harder.  In very serious cases, this could cause you to be put in the hospital. It might even be life-threatening. By limiting sodium, you will feel better and lower your risk of serious problems. The most common source of sodium is salt. People get most of the salt in their diet from canned, prepared, and packaged foods. Fast food and restaurant meals also are very high in sodium. Your doctor will probably limit your sodium to less than 2,000 milligrams (mg) a day. This limit counts all the sodium in prepared and packaged foods and any salt you add to your food. Follow-up care is a key part of your treatment and safety. Be sure to make and go to all appointments, and call your doctor if you are having problems. It's also a good idea to know your test results and keep a list of the medicines you take. How can you care for yourself at home? Read food labels · Read labels on cans and food packages. The labels tell you how much sodium is in each serving. Make sure that you look at the serving size. If you eat more than the serving size, you have eaten more sodium. · Food labels also tell you the Percent Daily Value for sodium. Choose products with low Percent Daily Values for sodium. · Be aware that sodium can come in forms other than salt, including monosodium glutamate (MSG), sodium citrate, and sodium bicarbonate (baking soda). MSG is often added to Asian food. When you eat out, you can sometimes ask for food without MSG or added salt. Buy low-sodium foods · Buy foods that are labeled \"unsalted\" (no salt added), \"sodium-free\" (less than 5 mg of sodium per serving), or \"low-sodium\" (less than 140 mg of sodium per serving). Foods labeled \"reduced-sodium\" and \"light sodium\" may still have too much sodium. Be sure to read the label to see how much sodium you are getting. · Buy fresh vegetables, or frozen vegetables without added sauces. Buy low-sodium versions of canned vegetables, soups, and other canned goods. Prepare low-sodium meals · Cut back on the amount of salt you use in cooking. This will help you adjust to the taste. Do not add salt after cooking. One teaspoon of salt has about 2,300 mg of sodium. · Take the salt shaker off the table. · Flavor your food with garlic, lemon juice, onion, vinegar, herbs, and spices. Do not use soy sauce, lite soy sauce, steak sauce, onion salt, garlic salt, celery salt, mustard, or ketchup on your food. · Use low-sodium salad dressings, sauces, and ketchup. Or make your own salad dressings and sauces without adding salt. · Use less salt (or none) when recipes call for it. You can often use half the salt a recipe calls for without losing flavor. Other foods such as rice, pasta, and grains do not need added salt. · Rinse canned vegetables, and cook them in fresh water. This removes some-but not all-of the salt. · Avoid water that is naturally high in sodium or that has been treated with water softeners, which add sodium. Call your local water company to find out the sodium content of your water supply. If you buy bottled water, read the label and choose a sodium-free brand. Avoid high-sodium foods · Avoid eating: ¨ Smoked, cured, salted, and canned meat, fish, and poultry. ¨ Ham, monzon, hot dogs, and luncheon meats. ¨ Regular, hard, and processed cheese and regular peanut butter. ¨ Crackers with salted tops, and other salted snack foods such as pretzels, chips, and salted popcorn. ¨ Frozen prepared meals, unless labeled low-sodium. ¨ Canned and dried soups, broths, and bouillon, unless labeled sodium-free or low-sodium. ¨ Canned vegetables, unless labeled sodium-free or low-sodium. ¨ Western Cheryl fries, pizza, tacos, and other fast foods. ¨ Pickles, olives, ketchup, and other condiments, especially soy sauce, unless labeled sodium-free or low-sodium. Where can you learn more? Go to http://josr-candido.info/. Enter P303 in the search box to learn more about \"Low Sodium Diet (2,000 Milligram): Care Instructions. \" Current as of: May 12, 2017 Content Version: 11.7 © 3410-9509 [employer]. Care instructions adapted under license by [employer] (which disclaims liability or warranty for this information). If you have questions about a medical condition or this instruction, always ask your healthcare professional. [place] [address] any warranty or liability for your use of this information. [employer] Activation Thank you for requesting access to [employer]. Please follow the instructions below to securely access and download your online medical record. [employer] allows you to send messages to your doctor, view your test results, renew your prescriptions, schedule appointments, and more. How Do I Sign Up? 1. In your internet browser, go to [email or web address] 
2. Click on the First Time User? Click Here link in the Sign In box. You will be redirect to the New Member Sign Up page. 3. Enter your [employer] Access Code exactly as it appears below. You will not need to use this code after youve completed the sign-up process. If you do not sign up before the expiration date, you must request a new code. [employer] Access Code: [email or web address] Expires: 10/27/2018  2:07 PM (This is the date your [employer] access code will ) 4. Enter the last four digits of your Social Security Number (xxxx) and Date of Birth (mm/dd/yyyy) as indicated and click Submit. You will be taken to the next sign-up page. 5. Create a [employer] ID. This will be your [employer] login ID and cannot be changed, so think of one that is secure and easy to remember. 6. Create a [employer] password. You can change your password at any time. 7. Enter your Password Reset Question and Answer. This can be used at a later time if you forget your password. 8. Enter your e-mail address. You will receive e-mail notification when new information is available in [address] 9. Click Sign Up. You can now view and download portions of your medical record. 10. Click the Download Summary menu link to download a portable copy of your medical information. Additional Information If you have questions, please visit the Frequently Asked Questions section of the Equiom website at [email or web address] Remember, Equiom is NOT to be used for urgent needs. For medical emergencies, dial 911. Patient armband removed and shredded Equiom Announcement We are excited to announce that we are making your provider's discharge notes available to you in Equiom. You will see these notes when they are completed and signed by the physician that discharged you from your recent hospital stay. If you have any questions or concerns about any information you see in Equiom, please call the Health Information Department where you were seen or reach out to your Primary Care Provider for more information about your plan of care. Introducing [facility] HE[facility]! [facility] introduces Equiom patient portal. Now you can access parts of your medical record, email your doctor's office, and request medication refills online. 1. In your internet browser, go to [email or web address] 2. Click on the First Time User? Click Here link in the Sign In box. You will see the New Member Sign Up page. 3. Enter your Equiom Access Code exactly as it appears below. You will not need to use this code after youve completed the sign-up process. If you do not sign up before the expiration date, you must request a new code. · Equiom Access Code: [email or web address] Expires: 10/27/2018  2:07 PM 
 
4. Enter the last four digits of your Social Security Number (xxxx) and Date of Birth (mm/dd/yyyy) as indicated and click Submit.  You will be taken to the next sign-up page. 5. Create a [employer] ID. This will be your Pango login ID and cannot be changed, so think of one that is secure and easy to remember. 6. Create a [employer] password. You can change your password at any time. 7. Enter your Password Reset Question and Answer. This can be used at a later time if you forget your password. 8. Enter your e-mail address. You will receive e-mail notification when new information is available in [address] 9. Click Sign Up. You can now view and download portions of your medical record. 10. Click the Download Summary menu link to download a portable copy of your medical information. If you have questions, please visit the Frequently Asked Questions section of the Pango website. Remember, Pango is NOT to be used for urgent needs. For medical emergencies, dial 911. Now available from your iPhone and Android! Introducing Brian Aguilera As a [facility] patient, I wanted to make you aware of our electronic visit tool called Brian Aguilera. [facility] 24/7 allows you to connect within minutes with a medical provider 24 hours a day, seven days a week via a mobile device or tablet or logging into a secure website from your computer. You can access Brian Aguilera from anywhere in the [place]. A virtual visit might be right for you when you have a simple condition and feel like you just dont want to get out of bed, or cant get away from work for an appointment, when your regular [facility] provider is not available (evenings, weekends or holidays), or when youre out of town and need minor care. Electronic visits cost only $49 and if the [facility] 24/7 provider determines a prescription is needed to treat your condition, one can be electronically transmitted to a nearby pharmacy*. Please take a moment to enroll today if you have not already done so.   The enrollment process is free and takes just a few minutes. To enroll, please download the [employer] 24/7 deepika to your tablet or phone, or visit [email or web address] to enroll on your computer. And, as an [address] [place] [address] patient with a [employer] account, the results of your visits will be scanned into your electronic medical record and your primary care provider will be able to view the scanned results. We urge you to continue to see your regular [employer] provider for your ongoing medical care. And while your primary care provider may not be the one available when you seek a [employer] virtual visit, the peace of mind you get from getting a real diagnosis real time can be priceless. For more information on [employer], view our Frequently Asked Questions (FAQs) at [email or web address]. Sincerely, 
 
Emiliano Ahumada MD 
Chief Medical Officer Kalee Pollard *:  certain medications cannot be prescribed via [employer] Unresulted Labs-Please follow up with your PCP about these lab tests Order Current Status IMMUNOGLOBULIN E, QT In process Providers Seen During Your Hospitalization Provider Specialty Primary office phone Micha Pryor MD Emergency Medicine [phone] Lesley Vital MD Family Practice [phone] Nini Perez MD [facility] [phone] Your Primary Care Physician (PCP) Primary Care Physician Office Phone Office Fax [address],[facility], [address] [facility] [phone] You are allergic to the following Allergen Reactions Lisinopril Rash Hypertensive crisis Tetracycline Anaphylaxis Rash Swelling Hibiclens (Chlorhexidine Gluconate) Other (comments) Turned red and BP drop low. Other Medication Rash Swelling  
 -Myacins Recent Documentation Height Weight BMI OB Status Smoking Status 1.651 m 95.3 kg 34.95 kg/m2 Postmenopausal Former Smoker Emergency Contacts Name Discharge Info Relation Home Work Mobile Mal Garay DISCHARGE CAREGIVER [3] Spouse [3] [phone] Chris Lizarraga DISCHARGE CAREGIVER [3] Son [22] [phone] Jairo Ogden  Child [2] [phone] Patient Belongings The following personal items are in your possession at time of discharge: 
  Dental Appliances: None  Visual Aid: None      Home Medications: None   Jewelry: Ring  Clothing: Shirt, Pants    Other Valuables: Cell Phone, Arlyss Rouge Discharge Instructions Attachments/References MEFS - AZITHROMYCIN (ZITHROMAX, ZITHROMAX TRI-PATRICK, ZITHROMAX Z-PATRICK, ZMAX) - (BY MOUTH) (ENGLISH) MEFS - PREDNISONE (PREDNISONE INTENSOL, PREDNICOT, DELTASONE, SAMARIA) - (BY MOUTH) (ENGLISH) UMECLIDINIUM (BY BREATHING) (ENGLISH) ALBUTEROL (BY BREATHING) (ENGLISH) FLUTICASONE/VILANTEROL (BY BREATHING) (ENGLISH) MEFS - FLUTICASONE (FLONASE, NOVAPLUS FLUTICASONE PROPIONATE, VERAMYST) - (INTO THE NOSE) (ENGLISH) Patient Handouts Azithromycin (Zithromax, Zithromax Tri-Patrick, Zithromax Z-Patrick, Zmax) - (By mouth) Why this medicine is used:  
Treats infections. Contact a nurse or doctor right away if you have: · Blistering, peeling, red skin rash · Fast, pounding, or uneven heartbeat; lightheadedness or fainting · Yellow skin or eyes · Dark urine, pale stools, fainting, dizziness, lightheadedness · Severe or bloody diarrhea · Double vision, tiredness or weakness · Feeling irritable or vomits after feeding (in babies) Common side effects: · Mild diarrhea, nausea, vomiting, stomach pain © 2017 [facility] Information is for End User's use only and may not be sold, redistributed or otherwise used for commercial purposes. Prednisone (predniSONE Intensol, Prednicot, Deltasone, Samaria) - (By mouth) Why this medicine is used: Treats many diseases and conditions, especially problems related to inflammation. Contact a nurse or doctor right away if you have: 
· Rapid weight gain; swelling in your hands, ankles, or feet · Severe stomach pain, nausea, vomiting, or red or black stools · Depression, unusual thoughts, feelings, or behaviors, trouble sleeping · Fever, chills, cough, sore throat, and body aches · Trouble seeing, eye pain Common side effects: 
· Increased appetite, weight gain · Round, puffy face · Muscle pain, weakness © 2017 [facility] Information is for End User's use only and may not be sold, redistributed or otherwise used for commercial purposes. Umeclidinium (By breathing) Umeclidinium (rw-rup-qg-DIN-ee-um) Treats chronic obstructive pulmonary disease (COPD). Brand Name(s): Incruse Keithelisa There may be other brand names for this medicine. When This Medicine Should Not Be Used: This medicine is not right for everyone. Do not use it if you had an allergic reaction to umeclidinium or milk proteins. How to Use This Medicine:  
Powder · Your doctor will tell you how much medicine to use. Do not use more than directed. Use this medicine at the same time each day. · To use:  
¨ Open the cover of the inhaler until you hear a click. The inhaler is now ready to use. Do not close the cover until you have taken your dose. If you open and close the cover without inhaling the dose, you will lose the medicine. Do not take more than 1 puff per day. ¨ Before you inhale your dose, breathe out fully. Try to empty your lungs as much as possible. ¨ Place the mouthpiece of the inhaler in your mouth and breathe in a steady, deep breath. Do not breathe in through your nose. ¨ Remove the inhaler from your mouth. Hold your breath for about 3 to 4 seconds, then breathe out slowly. ¨ The inhaler has a window that shows the number of doses that are left. The counter will turn red when the inhaler has fewer than 10 doses left. This will remind you to refill your prescription. · Read and follow the patient instructions that come with this medicine. Talk to your doctor or pharmacist if you have any questions. · Missed dose: Take a dose as soon as you remember. If it is almost time for your next dose, wait until then and take a regular dose. Do not take extra medicine to make up for a missed dose. · Store the medicine in a closed container at room temperature, away from heat, moisture, and direct light. Keep the medicine in the foil tray. Do not open it until you are ready to use the inhaler for the first time. Throw away the medicine 6 weeks after you open it or when the counter reads 0. Drugs and Foods to Avoid: Ask your doctor or pharmacist before using any other medicine, including over-the-counter medicines, vitamins, and herbal products. · Some medicines can affect how umeclidinium works. Tell your doctor if you are using an anticholinergic medicine, such as the following: ¨ Aclidinium ¨ Atropine ¨ Ipratropium ¨ Tiotropium Warnings While Using This Medicine: · Tell your doctor if you are pregnant or breastfeeding, or if you have glaucoma, heart problems, trouble urinating, an enlarged prostate, or a bladder blockage. · This medicine may cause the following problems: 
¨ Increased trouble breathing (may be life-threatening) ¨ New or worse narrow-angle glaucoma ¨ New or worse trouble urinating · Do not use this medicine to treat a COPD attack. Your doctor may prescribe another medicine that you should use when you have a COPD flare-up. · Your doctor will check your progress and the effects of this medicine at regular visits. Keep all appointments. · Keep all medicine out of the reach of children. Never share your medicine with anyone. Possible Side Effects While Using This Medicine: Call your doctor right away if you notice any of these side effects: · Allergic reaction: Itching or hives, swelling in your face or hands, swelling or tingling in your mouth or throat, chest tightness, trouble breathing · Decrease in how much or how often you urinate, difficult or painful urination · Eye pain, blurred vision, other vision changes · Worse breathing problems If you notice other side effects that you think are caused by this medicine, tell your doctor. Call your doctor for medical advice about side effects. You may report side effects to FDA at 8-219-FDA-8950 © 2017 [address] Information is for End User's use only and may not be sold, redistributed or otherwise used for commercial purposes. The above information is an  only. It is not intended as medical advice for individual conditions or treatments. Talk to your doctor, nurse or pharmacist before following any medical regimen to see if it is safe and effective for you. Albuterol (By breathing) Albuterol (al-BUE-ter-ol) Treats or prevents bronchospasm. Brand Name(s): AccuNeb, ProAir HFA, ProAir RespiClick, Proventil HFA, Ventolin HFA There may be other brand names for this medicine. When This Medicine Should Not Be Used: This medicine is not right for everyone. Do not use it if you had an allergic reaction to albuterol or milk proteins. How to Use This Medicine:  
Aerosol, Powder Under Pressure, Suspension, Solution, Powder · Your doctor will tell you how much medicine to use. Do not use more than directed. Ask your doctor or pharmacist if you have questions about how to use your inhaler, nebulizer, or medicine. · Solution:  
¨ You will use this medicine with an inhaler device called a nebulizer. The nebulizer turns the medicine into a fine mist that you breathe in through your mouth and to your lungs. Your caregiver will show you how to use your nebulizer. ¨ Store unopened vials of this medicine at room temperature, away from heat and direct light. Do not freeze. An open vial of medicine must be used right away. · Aerosol inhaler: ¨ Shake the inhaler well just before each use. Avoid spraying this medicine into your eyes. ¨ Test spray in the air before using for the first time or if the inhaler has not been used for a while. ¨ If you are supposed to use more than one puff, wait 1 to 2 minutes before inhaling the second puff. Repeat these steps for the next puff, starting with shaking the inhaler. ¨ Clean the inhaler mouthpiece at least once a week with warm running water for 30 seconds. Let it air dry completely. ¨ Store the canister at room temperature, away from heat and direct light. Do not freeze. Do not keep this medicine inside a car where it could be exposed to extreme heat or cold. Do not poke holes in the canister or throw it into a fire, even if the canister is empty. · ProAir® Respiclick® inhaler: ¨ Make sure the cap is closed. Do not open the cap unless you are going to use the medicine. ¨ Hold the inhaler upright. Open the cap fully until you hear a click. Your inhaler is now ready to use. ¨ Close the cap firmly over the mouthpiece after each use. ¨ Keep the inhaler clean and dry at all times. Do not wash or put any part of the inhaler in water. ¨ To clean the mouthpiece, wipe it gently with a dry cloth or tissue. ¨ Keep the medicine in the foil pouch until you are ready to use it. Store at room temperature, away from heat and direct light. Do not freeze. · Read and follow the patient instructions that come with this medicine. Talk to your doctor or pharmacist if you have any questions. · Missed dose: Take a dose as soon as you remember. If it is almost time for your next dose, wait until then and take a regular dose. Do not take extra medicine to make up for a missed dose. Drugs and Foods to Avoid: Ask your doctor or pharmacist before using any other medicine, including over-the-counter medicines, vitamins, and herbal products. · Some foods and medicines can affect how albuterol works. Tell your doctor if you are using any of the following: ¨ Digoxin ¨ Beta-blocker medicine ¨ Diuretic (water pill) ¨ Medicine for depression or an MAO inhibitor within the past 2 weeks Warnings While Using This Medicine: · Tell your doctor if you are pregnant or breastfeeding, or if you have kidney disease, diabetes, heart disease, heart rhythm problems, high blood pressure, overactive thyroid, or a history of seizures. · This medicine may cause the following problems:  
¨ Paradoxical bronchospasm (increased trouble breathing right after use) ¨ Low potassium levels in the blood · If you use a corticosteroid medicine to control your asthma, keep using it as instructed by your doctor. · Call your doctor if your symptoms do not improve or if they get worse. · If any of your asthma medicines do not seem to be working as well as usual, call your doctor right away. Do not change your doses or stop using your medicines without asking your doctor. · Your doctor will check your progress and the effects of this medicine at regular visits. Keep all appointments. · Keep all medicine out of the reach of children. Never share your medicine with anyone. Possible Side Effects While Using This Medicine:  
Call your doctor right away if you notice any of these side effects: · Allergic reaction: Itching or hives, swelling in your face or hands, swelling or tingling in your mouth or throat, chest tightness, trouble breathing · Dry mouth, increased thirst, muscle cramps, nausea, vomiting · Fast, pounding, or uneven heartbeat, chest pain · Lightheadedness, dizziness, or fainting · Trouble breathing, increased wheezing, cough, chest tightness If you notice these less serious side effects, talk with your doctor: · Cough, sore throat, runny or stuffy nose · Headache · Tremors, nervousness If you notice other side effects that you think are caused by this medicine, tell your doctor. Call your doctor for medical advice about side effects. You may report side effects to FDA at 9-196-FDA-0303 © 2017 [address] Information is for End User's use only and may not be sold, redistributed or otherwise used for commercial purposes. The above information is an  only. It is not intended as medical advice for individual conditions or treatments. Talk to your doctor, nurse or pharmacist before following any medical regimen to see if it is safe and effective for you. Fluticasone/Vilanterol (By breathing) Fluticasone Furoate (ostf-ZFS-l-sone FURE-oh-ate), Vilanterol Trifenatate (vye-WILFREDO-ter-ol fvke-QBA-b-conn) Treats asthma and chronic obstructive pulmonary disease (COPD). This medicine contains a steroid. Brand Name(s): Rajesh Cho There may be other brand names for this medicine. When This Medicine Should Not Be Used: This medicine is not right for everyone. Do not use it if you had an allergic reaction to fluticasone, vilanterol, or milk proteins. How to Use This Medicine:  
Powder · Use this medicine at the same time every day exactly as directed. Never use it more often than your doctor told you to. · This medicine should come with a Medication Guide. Ask your pharmacist for a copy if you do not have one. · This medicine is a powder that is used with its own inhaler device. Keep the medicine in the foil tray until you are ready to use the inhaler. · Each time you open the cover of the inhaler and hear a click, the inhaler is ready to use. Do not close the cover again until you have taken your dose. You will lose the dose if you open and close the cover without inhaling the medicine. · When you take a dose, inhale through your mouth. Do not breath in through your nose. · When the counter on the medicine turns red, there are less than 10 doses left. Refill your prescription as soon as possible. · When you have finished all your inhalations, rinse your mouth out with water. · Missed dose: Take a dose as soon as you remember. If it is almost time for your next dose, wait until then and take a regular dose. Do not take extra medicine to make up for a missed dose. Do not take more than 1 puff per day. · Store the medicine in a closed container at room temperature, away from heat, moisture, and direct light. Throw away this medicine 6 weeks after it was opened or when the counter reads \"0.\" Drugs and Foods to Avoid: Ask your doctor or pharmacist before using any other medicine, including over-the-counter medicines, vitamins, and herbal products. · Do not use this medicine together with similar inhaled medicines, including arformoterol, budesonide/formoterol, formoterol, indacaterol, or salmeterol. · Some medicines can affect how this medicine works. Tell your doctor if you are using any of the following: ¨ Clarithromycin, conivaptan, indinavir, itraconazole, ketoconazole, lopinavir, nefazodone, nelfinavir, ritonavir, saquinavir, telithromycin, troleandomycin, voriconazole ¨ Beta-blocker ¨ Diuretic (water pill) ¨ Tricyclic antidepressant or MAO inhibitor within the past 2 weeks Warnings While Using This Medicine: · Tell your doctor if you are pregnant or breastfeeding, or if you have liver disease, diabetes, cataracts, glaucoma, heart or blood vessel disease, high blood pressure, heart rhythm problems, osteoporosis, thyroid problems, or a history of seizures. · This medicine may cause the following problems: 
¨ Increased risk of pneumonia ¨ Adrenal gland problems ¨ Increased risk of paradoxical bronchospasm (trouble breathing right after use) and asthma-related death ¨ Changes in heart rhythm ¨ Osteoporosis (when used for a long time) ¨ Glaucoma or cataracts · Do not use this medicine to treat acute attacks. You should have another medicine to use for an acute asthma attack or COPD flare-up. Tell your doctor right away if your condition gets worse or you need to use your other medicine more often than usual. 
· This medicine may weaken your immune system and increase your risk for infection. Tell your doctor about any immune system problems or infections you have, including herpes in your eye or tuberculosis. Tell your doctor right away if you have been exposed to chickenpox or measles. · Your doctor will check your progress and the effects of this medicine at regular visits. Keep all appointments. · Keep all medicine out of the reach of children. Never share your medicine with anyone. Possible Side Effects While Using This Medicine:  
Call your doctor right away if you notice any of these side effects: · Allergic reaction: Itching or hives, swelling in your face or hands, swelling or tingling in your mouth or throat, chest tightness, trouble breathing · Changes in skin color, dark freckles, weakness, tiredness, nausea, vomiting, weight loss · Chest pain · Eye pain, vision loss, seeing halos around lights · Fast, pounding, or uneven heartbeat · Fever, chills, cough, runny or stuffy nose, sore throat, body aches · Lightheadedness, dizziness, fainting · Worsening of breathing problems, shortness of breath, wheezing If you notice these less serious side effects, talk with your doctor: · Dry mouth · White patches in your mouth or throat, pain when you eat or swallow If you notice other side effects that you think are caused by this medicine, tell your doctor. Call your doctor for medical advice about side effects. You may report side effects to FDA at 0-319-FDA-2933 © 2017 [address] Information is for End User's use only and may not be sold, redistributed or otherwise used for commercial purposes. The above information is an  only. It is not intended as medical advice for individual conditions or treatments. Talk to your doctor, nurse or pharmacist before following any medical regimen to see if it is safe and effective for you. Fluticasone (Flonase, Novaplus Fluticasone Propionate, Veramyst) - (Into the nose) Why this medicine is used:  
Treats allergy symptoms, such as runny or stuffy nose. Contact a nurse or doctor right away if you have: 
· Heavy nosebleeds · Eye pain, trouble seeing · Fever, chills, cough, sore throat, body aches Common side effects: · Sores or white patches inside the nose · Burning, redness, swelling, irritation around or inside your nose · Runny or stuffy nose · Headache © 2017 [address] Information is for End User's use only and may not be sold, redistributed or otherwise used for commercial purposes. Please provide this summary of care documentation to your next provider. Signatures-by signing, you are acknowledging that this After Visit Summary has been reviewed with you and you have received a copy. Patient Signature:  ____________________________________________________________ Date:  ____________________________________________________________  
  
[facility] Provider Signature:  ____________________________________________________________ Date:  ____________________________________________________________

## 2018-07-29 NOTE — ED TRIAGE NOTES
Patient states that her defibrillator fired this morning and x2 weeks ago. Dr. Sirisha Finnegan told her to come in. She is also complaining of pneumonia

## 2018-07-29 NOTE — ED PROVIDER NOTES
EMERGENCY DEPARTMENT HISTORY AND PHYSICAL EXAM 
 
2:41 PM 
 
 
Date: 7/29/2018 Patient Name: Roberto Allred History of Presenting Illness Chief Complaint Patient presents with  
 Other  
  defibrilator firing History Provided By: Patient Chief Complaint: Defibrillator Firing Duration: 4.5 Hours Timing:  Acute Location: Left chest 
Quality:  
Severity: moderate Modifying Factors: 2nd firing in 2 weeks Associated Symptoms: CP, cough, fatigue. Denies SOB or lightheadedness Additional History (Context): Roberto Allred is a 67 y.o. female with hx of CAD, COPD, DM, HTN, HLD and Medtronic pacemakerwho presents to ED after acute defibrillator firing in the left chest onset 4.5 hours. Pt states she had pneumonia 6 months ago and then was diagnosed a couple weeks ago and has been on Augmentin after being sent to [place] [address] ED by her PCP for CXR. Pt reports over the last two weeks her defibrillator has fired twice (once 2 weeks ago on the 16th) and then again this morning around 9:30AM (4.5 hours ago) while she was sitting on the toilet after urinating. Pt reports associated sx of coughing with an episode of emesis as well as left sided CP 2 nights ago radiating to the central chest while in bed watching tv that spontaneously resolved after 10 minutes. Pt denies CP, SOB, or lightheadedness immediately before or after experiencing the shock from the defibrillator. Pt states having fatigue and dry mouth currently in ED. Denies currently being on Lasix.  at bedside. Pt followed by Dr. Angela Loomis (Cardiology) but recently being followed by Dr. Malinda Dawkins as Dr. Angela Loomis is out of town. PCP: Susy Prader, MD 
 
Current Outpatient Prescriptions Medication Sig Dispense Refill  dilTIAZem CD (CARDIZEM CD) 240 mg ER capsule Take 1 Cap by mouth daily. 30 Cap 6  
 INCRUSE ELLIPTA 62.5 mcg/actuation inhaler Take 1 Puff by inhalation daily.     
 metoprolol tartrate (LOPRESSOR) 100 mg IR tablet TAKE ONE TABLET BY MOUTH TWICE DAILY 180 Tab 3  
 atorvastatin (LIPITOR) 40 mg tablet Take 1 Tab by mouth daily. 90 Tab 3  MULTIVIT-MIN/IRON/FOLIC/LUTEIN (CENTRUM SILVER WOMEN PO) Take  by mouth.  ELIQUIS 5 mg tablet Take 1 Tab by mouth two (2) times a day. 120 Tab 3  
 escitalopram oxalate (LEXAPRO) 20 mg tablet Take 20 mg by mouth daily.  metFORMIN ER (GLUCOPHAGE XR) 500 mg tablet Take 500 mg by mouth two (2) times a day.  exemestane (AROMASIN) 25 mg tablet Take 25 mg by mouth daily. Past History Past Medical History: 
Past Medical History:  
Diagnosis Date  CAD (coronary artery disease)   
 cardiomyopathy,CHF,,Cardiac Cath, pacemaker/defib.  Cancer [facility]) 2013  
 breast  
 Cardiac catheterization 02/27/2008 Patent coronary arteries. LVEDP 13 mmHg. EF 25-30%. Global hypk.  Cardiac echocardiogram 07/07/2016 EF 50% (prev 60-65% on study of 4/16/12). No WMA. Gr 1 DDfx. Normal RVSP.  Cardiac nuclear imaging test 05/31/2013 No evidence of ongoing ischemia or prior infarction. EF 60%. No RWMA. Mild dyssynchrony of inferior base & mid/distal septum likely c/w pacemaker. Nondiagnostic EKG on pharm stress test due to V-pacing.  Cardiovascular abdominal aorta duplex 09/23/2015 Fusiform AAA in prox & mid portions of abdom Ao.  Chronic lung disease  COPD (chronic obstructive pulmonary disease) (HCC) chronic bronchitis  Coronary artery disease Feb. 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  
 Coronary artery disease  Diabetes mellitus ([address]  GERD (gastroesophageal reflux disease)  Heart failure ([address]  Heart murmur  Hemangioma of liver  Hx of cardiomyopathy ([address]  Hypercholesterolemia  Hypertension  Left bundle branch block  Phlebitis  PVD (peripheral vascular disease) ([address]  Renal calculi 1999  Thromboembolus ([address] below knee in R leg Past Surgical History: 
Past Surgical History:  
Procedure Laterality Date  COLONOSCOPY N/A 3/7/2017 COLONOSCOPY, SURVEILLANCE with polypectomy performed by Eda Parson MD at [address] [place] [address] HX BREAST LUMPECTOMY Left 8/2013  
 cancerous lesion  HX CATARACT REMOVAL Bilateral 1980s w/ lens implants  HX CATARACT REMOVAL Left   
 re vised due to implant repositioning  HX HEART CATHETERIZATION  02/27/08 Patent coronary arteries, but severe LV dysfunction w/ EF in the 25-30% range.  HX KNEE ARTHROSCOPY Right  HX MASTECTOMY Left 9/24/2014 LEFT PARTIAL MASTECTOMY WITH NEEDLE LOCALIZATION MAMMOGRAM TIMES TWO performed by Aminata Chance MD at [address] [place] [address] HX PACEMAKER  May 2008 Biventricular ICD implantation  HX PACEMAKER  2012  
 replacement - Medtronic  HX PACEMAKER PLACEMENT    
 HX PELVIC LAPAROSCOPY    
 diagnostic - varicosities Family History: 
Family History Problem Relation Age of Onset  Cancer Mother Cancer of the breast  
 Stroke Mother  Cancer Father  Heart Disease Father CHF  COPD Brother  Heart Attack Brother Multiple  Heart Disease Brother CHF Social History: 
Social History Substance Use Topics  Smoking status: Former Smoker Packs/day: 1.50 Years: 20.00 Types: Cigarettes Quit date: 2/23/1988  Smokeless tobacco: Never Used  Alcohol use No  
 
 
Allergies: Allergies Allergen Reactions  Lisinopril Rash Hypertensive crisis  Tetracycline Anaphylaxis, Rash and Swelling  Hibiclens [Chlorhexidine Gluconate] Other (comments) Turned red and BP drop low.  Other Medication Rash and Swelling  
  -Myacins Review of Systems Review of Systems Constitutional: Positive for fatigue. Respiratory: Positive for cough. Negative for shortness of breath. Cardiovascular: Positive for chest pain.   
Gastrointestinal: Positive for vomiting. Neurological: Negative for light-headedness. All other systems reviewed and are negative. Physical Exam  
 
Patient Vitals for the past 12 hrs: 
 Temp Pulse Resp BP SpO2  
07/29/18 1630 - - - - 97 %  
07/29/18 1630 - 84 17 - 99 % 07/29/18 1615 - 82 17 91/69 98 %  
07/29/18 1600 - 89 17 98/72 97 %  
07/29/18 1545 - 88 21 101/63 98 %  
07/29/18 1530 - 87 21 94/72 96 %  
07/29/18 1515 - 96 21 105/64 98 %  
07/29/18 1500 - 89 19 (!) 89/64 97 %  
07/29/18 1445 - 89 20 99/71 97 %  
07/29/18 1430 - 98 21 (!) 86/58 98 %  
07/29/18 1415 - (!) 103 23 - -  
07/29/18 1408 96.9 °F (36.1 °C) 100 18 96/68 97 % Physical Exam  
Constitutional: She is oriented to person, place, and time. She appears well-developed. HENT:  
Head: Normocephalic and atraumatic. Eyes: Conjunctivae and EOM are normal.  
Neck: Normal range of motion. Cardiovascular: Normal heart sounds. Exam reveals no gallop and no friction rub. No murmur heard. Pulmonary/Chest: Effort normal and breath sounds normal. No stridor. Abdominal: Soft. There is no tenderness. Musculoskeletal: Normal range of motion. She exhibits no tenderness. Neurological: She is alert and oriented to person, place, and time. Skin: Skin is warm and dry. She is not diaphoretic. Psychiatric: She has a normal mood and affect. Her behavior is normal.  
Nursing note and vitals reviewed. Diagnostic Study Results Labs - Recent Results (from the past 12 hour(s)) CBC WITH AUTOMATED DIFF Collection Time: 07/29/18  2:30 PM  
Result Value Ref Range WBC 13.1 4.6 - 13.2 K/uL  
 RBC 4.99 4.20 - 5.30 M/uL  
 HGB 14.0 12.0 - 16.0 g/dL HCT 41.7 35.0 - 45.0 % MCV 83.6 74.0 - 97.0 FL  
 MCH 28.1 24.0 - 34.0 PG  
 MCHC 33.6 31.0 - 37.0 g/dL  
 RDW 15.4 (H) 11.6 - 14.5 % PLATELET 680 550 - 330 K/uL MPV 9.5 9.2 - 11.8 FL  
 NEUTROPHILS 69 40 - 73 % LYMPHOCYTES 21 21 - 52 % MONOCYTES 7 3 - 10 % EOSINOPHILS 2 0 - 5 % BASOPHILS 1 0 - 2 %  
 ABS. NEUTROPHILS 9.2 (H) 1.8 - 8.0 K/UL  
 ABS. LYMPHOCYTES 2.8 0.9 - 3.6 K/UL  
 ABS. MONOCYTES 0.9 0.05 - 1.2 K/UL  
 ABS. EOSINOPHILS 0.3 0.0 - 0.4 K/UL  
 ABS. BASOPHILS 0.1 0.0 - 0.1 K/UL  
 DF AUTOMATED METABOLIC PANEL, COMPREHENSIVE Collection Time: 07/29/18  2:30 PM  
Result Value Ref Range Sodium 140 136 - 145 mmol/L Potassium 3.8 3.5 - 5.5 mmol/L Chloride 103 100 - 108 mmol/L  
 CO2 26 21 - 32 mmol/L Anion gap 11 3.0 - 18 mmol/L Glucose 114 (H) 74 - 99 mg/dL BUN 16 7.0 - 18 MG/DL Creatinine 1.55 (H) 0.6 - 1.3 MG/DL  
 BUN/Creatinine ratio 10 (L) 12 - 20 GFR est AA 40 (L) >60 ml/min/1.73m2 GFR est non-AA 33 (L) >60 ml/min/1.73m2 Calcium 9.5 8.5 - 10.1 MG/DL Bilirubin, total 0.8 0.2 - 1.0 MG/DL  
 ALT (SGPT) 19 13 - 56 U/L  
 AST (SGOT) 16 15 - 37 U/L Alk. phosphatase 105 45 - 117 U/L Protein, total 7.4 6.4 - 8.2 g/dL Albumin 3.8 3.4 - 5.0 g/dL Globulin 3.6 2.0 - 4.0 g/dL A-G Ratio 1.1 0.8 - 1.7 CARDIAC PANEL,(CK, CKMB & TROPONIN) Collection Time: 07/29/18  2:30 PM  
Result Value Ref Range CK 94 26 - 192 U/L  
 CK - MB 2.3 <3.6 ng/ml CK-MB Index 2.4 0.0 - 4.0 % Troponin-I, Qt. 0.27 (H) 0.0 - 0.045 NG/ML  
MAGNESIUM Collection Time: 07/29/18  2:30 PM  
Result Value Ref Range Magnesium 1.9 1.6 - 2.6 mg/dL EKG, 12 LEAD, INITIAL Collection Time: 07/29/18  2:37 PM  
Result Value Ref Range Ventricular Rate 96 BPM  
 Atrial Rate 96 BPM  
 P-R Interval 112 ms QRS Duration 104 ms Q-T Interval 384 ms QTC Calculation (Bezet) 485 ms Calculated P Axis 38 degrees Calculated R Axis -68 degrees Calculated T Axis 84 degrees Diagnosis Normal sinus rhythm RSR' or QR pattern in V1 suggests right ventricular conduction delay Left anterior fascicular block Minimal voltage criteria for LVH, may be normal variant Possible Lateral infarct (cited on or before 31-MAY-2013) Abnormal ECG When compared with ECG of 31-MAY-2013 10:34, Significant changes have occurred Radiologic Studies -  
XR CHEST PORT    (Results Pending) Medical Decision Making Provider Notes (Medical Decision Making): Reassuring exam here. Reassuring vitals, but with elevated trop and episode of VT/VF to 214 resulting in appropriate shock according to medtronic device interrogation and discussion with tech. Will replete mag and k, and admit for echo and further cardiac work up, trending trops. I am the first provider for this patient. I reviewed the vital signs, available nursing notes, past medical history, past surgical history, family history and social history. Vital Signs-Reviewed the patient's vital signs. Pulse Oximetry Analysis -  97% on room air (Interpretation) Normal 
 
Cardiac Monitor: 
Rate: 100 bpm 
Rhythm:  Normal Sinus Rhythm EKG: Interpreted by the EP. Time Interpreted: 1802 Rate: 96 bpm 
 Rhythm: Normal Sinus Rhythm Interpretation: RSR pattern appears to be paced. Non-specific ST changes. Comparison: unchanged from 2/20/2018 Records Reviewed: Nursing Notes (Time of Review: 2:48 PM) ED Course: Progress Notes, Reevaluation, and Consults: 
4:07 PM - Spoke with [facility] who stated they have not yet received the transmission. 4:19 PM - Spoke with [facility] and report shows pt did have a shock around 9:30 AM with rapid ventricular rate as high as 214 with A-fib as well during that time. 4:22 PM - Paged Dr. Anastasiia Cadena (Cardiology) 4:25 PM Consult: I discussed care with Dr. Ag Macario (Cardiology). It was a standard discussion including patient history, chief complaint, available diagnostic results, and predicted treatment course. He will follow pt.  
 
4:26 PM - Paged Dr. Elaine Sahu 5:17 PM Consult: I discussed care with Dr. Clarisse Gaona  (Admitting for Dr. Rosana Padron).   It was a standard discussion including patient history, chief complaint, available diagnostic results, and predicted treatment course. Agrees to admit. For Hospitalized Patients: 
 
1. Hospitalization Decision Time: The decision to hospitalize the patient was made by Dr. Manny Martini at 4:26 PM on 7/29/2018 2. Aspirin: Aspirin was given Diagnosis Clinical Impression: 1. Palpitations Disposition: Admit Follow-up Information None Patient's Medications Start Taking No medications on file Continue Taking ATORVASTATIN (LIPITOR) 40 MG TABLET    Take 1 Tab by mouth daily. DILTIAZEM CD (CARDIZEM CD) 240 MG ER CAPSULE    Take 1 Cap by mouth daily. ELIQUIS 5 MG TABLET    Take 1 Tab by mouth two (2) times a day. ESCITALOPRAM OXALATE (LEXAPRO) 20 MG TABLET    Take 20 mg by mouth daily. EXEMESTANE (AROMASIN) 25 MG TABLET    Take 25 mg by mouth daily. INCRUSE ELLIPTA 62.5 MCG/ACTUATION INHALER    Take 1 Puff by inhalation daily. METFORMIN ER (GLUCOPHAGE XR) 500 MG TABLET    Take 500 mg by mouth two (2) times a day. METOPROLOL TARTRATE (LOPRESSOR) 100 MG IR TABLET    TAKE ONE TABLET BY MOUTH TWICE DAILY MULTIVIT-MIN/IRON/FOLIC/LUTEIN (CENTRUM SILVER WOMEN PO)    Take  by mouth. These Medications have changed No medications on file Stop Taking No medications on file  
 
_______________________________ Attestations: 
Scribe Attestation Demond Rajan acting as a scribe for and in the presence of Severino Billy MD     
July 29, 2018 at 2:48 PM 
    
Provider Attestation:     
I personally performed the services described in the documentation, reviewed the documentation, as recorded by the scribe in my presence, and it accurately and completely records my words and actions. July 29, 2018 at 2:48 PM - Severino Billy MD   
_______________________________

## 2018-07-29 NOTE — IP AVS SNAPSHOT
[address] Patient: Maria C Johnson MRN: [id number] :1945 A check corazon indicates which time of day the medication should be taken. My Medications START taking these medications Instructions Each Dose to Equal  
 Morning Noon Evening Bedtime  
 albuterol sulfate 90 mcg/actuation Aepb Commonly known as:  Quilla Miser Your last dose was: Your next dose is: Take 2 Puffs by inhalation every four (4) hours as needed. 2 Puff  
    
   
   
   
  
 azithromycin 500 mg Tab Commonly known as:  Susie Vishal Your last dose was: Your next dose is: Take 1 Tab by mouth daily for 5 days. 500 mg  
    
   
   
   
  
 fluticasone 50 mcg/actuation nasal spray Commonly known as:  Adriel Harrison Your last dose was: Your next dose is: 2 Sprays by Both Nostrils route daily. 2 Spray  
    
   
   
   
  
 fluticasone-vilanterol 100-25 mcg/dose inhaler Commonly known as:  BREO ELLIPTA Your last dose was: Your next dose is: Take 1 Puff by inhalation daily. 1 Puff  
    
   
   
   
  
 predniSONE 20 mg tablet Commonly known as:  Sangeetha Hankins Start taking on:  2018 Your last dose was: Your next dose is: Take 1 Tab by mouth daily (with breakfast). Start 18, last dose 8/3/18  
 20 mg  
    
   
   
   
  
 umeclidinium 62.5 mcg/actuation inhaler Commonly known as:  INCRUSE ELLIPTA Your last dose was: Your next dose is: Take 1 Puff by inhalation daily. 1 Puff CONTINUE taking these medications Instructions Each Dose to Equal  
 Morning Noon Evening Bedtime  
 atorvastatin 40 mg tablet Commonly known as:  LIPITOR Your last dose was: Your next dose is: Take 1 Tab by mouth daily. 40 mg CARDIZEM  mg ER capsule Generic drug:  dilTIAZem CD Your last dose was: Your next dose is: Take 240 mg by mouth daily. 240 mg CENTRUM SILVER WOMEN PO Your last dose was: Your next dose is: Take  by mouth. CLARITIN 10 mg tablet Generic drug:  loratadine Your last dose was: Your next dose is: Take 10 mg by mouth. 10 mg  
    
   
   
   
  
 ELIQUIS 5 mg tablet Generic drug:  apixaban Your last dose was: Your next dose is: Take 1 Tab by mouth two (2) times a day. 5 mg  
    
   
   
   
  
 escitalopram oxalate 20 mg tablet Commonly known as:  Mele Gary Your last dose was: Your next dose is: Take 20 mg by mouth daily. 20 mg  
    
   
   
   
  
 exemestane 25 mg tablet Commonly known as:  Alo Piper Your last dose was: Your next dose is: Take 25 mg by mouth daily. 25 mg  
    
   
   
   
  
 metoprolol tartrate 100 mg IR tablet Commonly known as:  LOPRESSOR Your last dose was: Your next dose is: TAKE ONE TABLET BY MOUTH TWICE DAILY  
     
   
   
   
  
 montelukast 10 mg tablet Commonly known as:  SINGULAIR Your last dose was: Your next dose is: Take 10 mg by mouth daily. Indications: Allergic Rhinitis 10 mg  
    
   
   
   
  
  
STOP taking these medications   
 metFORMIN  mg tablet Commonly known as:  GLUCOPHAGE XR Where to Get Your Medications Information on where to get these meds will be given to you by the nurse or doctor. ! Ask your nurse or doctor about these medications  
  albuterol sulfate 90 mcg/actuation Aepb  
 azithromycin 500 mg Tab  
 fluticasone 50 mcg/actuation nasal spray fluticasone-vilanterol 100-25 mcg/dose inhaler  
 predniSONE 20 mg tablet  
 umeclidinium 62.5 mcg/actuation inhaler

## 2018-07-30 ENCOUNTER — APPOINTMENT (OUTPATIENT)
Dept: ULTRASOUND IMAGING | Age: 73
DRG: 309 | End: 2018-07-30
Attending: FAMILY MEDICINE
Payer: MEDICARE

## 2018-07-30 ENCOUNTER — APPOINTMENT (OUTPATIENT)
Dept: CT IMAGING | Age: 73
DRG: 309 | End: 2018-07-30
Attending: INTERNAL MEDICINE
Payer: MEDICARE

## 2018-07-30 ENCOUNTER — APPOINTMENT (OUTPATIENT)
Dept: NON INVASIVE DIAGNOSTICS | Age: 73
DRG: 309 | End: 2018-07-30
Attending: INTERNAL MEDICINE
Payer: MEDICARE

## 2018-07-30 LAB
CK MB CFR SERPL CALC: 1.7 % (ref 0–4)
CK MB CFR SERPL CALC: 1.9 % (ref 0–4)
CK MB SERPL-MCNC: 1.7 NG/ML (ref 5–25)
CK MB SERPL-MCNC: 1.8 NG/ML (ref 5–25)
CK SERPL-CCNC: 95 U/L (ref 26–192)
CK SERPL-CCNC: 99 U/L (ref 26–192)
ECHO AO ROOT DIAM: 3.31 CM
ECHO LA AREA 4C: 20.2 CM2
ECHO LA VOL 2C: 67.83 ML (ref 22–52)
ECHO LA VOL 4C: 51.49 ML (ref 22–52)
ECHO LA VOL BP: 62.89 ML (ref 22–52)
ECHO LA VOL/BSA BIPLANE: 31.14 ML/M2
ECHO LA VOLUME INDEX A2C: 33.59 ML/M2
ECHO LA VOLUME INDEX A4C: 25.5 ML/M2
ECHO LV INTERNAL DIMENSION DIASTOLIC: 4.9 CM (ref 3.9–5.3)
ECHO LV INTERNAL DIMENSION SYSTOLIC: 3.63 CM
ECHO LV IVSD: 1.22 CM (ref 0.6–0.9)
ECHO LV MASS 2D: 271.2 G (ref 67–162)
ECHO LV MASS INDEX 2D: 134.3 G/M2
ECHO LV POSTERIOR WALL DIASTOLIC: 1.2 CM (ref 0.6–0.9)
ECHO LVOT DIAM: 2.21 CM
ECHO LVOT PEAK GRADIENT: 4.1 MMHG
ECHO LVOT PEAK VELOCITY: 101.09 CM/S
ECHO LVOT VTI: 20.37 CM
ECHO MV A VELOCITY: 80.85 CM/S
ECHO MV E DECELERATION TIME (DT): 269.1 MS
ECHO MV E VELOCITY: 0.53 CM/S
ECHO MV E/A RATIO: 0.01
ECHO TV REGURGITANT MAX VELOCITY: 305.36 CM/S
ECHO TV REGURGITANT PEAK GRADIENT: 37.3 MMHG
GLUCOSE BLD STRIP.AUTO-MCNC: 102 MG/DL (ref 70–110)
GLUCOSE BLD STRIP.AUTO-MCNC: 117 MG/DL (ref 70–110)
GLUCOSE BLD STRIP.AUTO-MCNC: 170 MG/DL (ref 70–110)
TROPONIN I SERPL-MCNC: 0.07 NG/ML (ref 0–0.04)
TROPONIN I SERPL-MCNC: 0.11 NG/ML (ref 0–0.04)

## 2018-07-30 PROCEDURE — 74011250637 HC RX REV CODE- 250/637: Performed by: FAMILY MEDICINE

## 2018-07-30 PROCEDURE — 36415 COLL VENOUS BLD VENIPUNCTURE: CPT | Performed by: FAMILY MEDICINE

## 2018-07-30 PROCEDURE — 74011250636 HC RX REV CODE- 250/636: Performed by: FAMILY MEDICINE

## 2018-07-30 PROCEDURE — 74011000250 HC RX REV CODE- 250: Performed by: INTERNAL MEDICINE

## 2018-07-30 PROCEDURE — 74011636637 HC RX REV CODE- 636/637: Performed by: INTERNAL MEDICINE

## 2018-07-30 PROCEDURE — 71250 CT THORAX DX C-: CPT

## 2018-07-30 PROCEDURE — 82962 GLUCOSE BLOOD TEST: CPT

## 2018-07-30 PROCEDURE — 76770 US EXAM ABDO BACK WALL COMP: CPT

## 2018-07-30 PROCEDURE — 82785 ASSAY OF IGE: CPT | Performed by: INTERNAL MEDICINE

## 2018-07-30 PROCEDURE — C8929 TTE W OR WO FOL WCON,DOPPLER: HCPCS

## 2018-07-30 PROCEDURE — 65270000029 HC RM PRIVATE

## 2018-07-30 PROCEDURE — 82787 IGG 1 2 3 OR 4 EACH: CPT | Performed by: INTERNAL MEDICINE

## 2018-07-30 PROCEDURE — 82553 CREATINE MB FRACTION: CPT | Performed by: FAMILY MEDICINE

## 2018-07-30 PROCEDURE — 74011000258 HC RX REV CODE- 258: Performed by: FAMILY MEDICINE

## 2018-07-30 PROCEDURE — 94640 AIRWAY INHALATION TREATMENT: CPT

## 2018-07-30 RX ORDER — SODIUM CHLORIDE 450 MG/100ML
75 INJECTION, SOLUTION INTRAVENOUS CONTINUOUS
Status: DISCONTINUED | OUTPATIENT
Start: 2018-07-30 | End: 2018-07-31

## 2018-07-30 RX ORDER — PREDNISONE 20 MG/1
20 TABLET ORAL
Status: DISCONTINUED | OUTPATIENT
Start: 2018-07-30 | End: 2018-07-31 | Stop reason: HOSPADM

## 2018-07-30 RX ORDER — LORATADINE 10 MG/1
10 TABLET ORAL DAILY
Status: DISCONTINUED | OUTPATIENT
Start: 2018-07-30 | End: 2018-07-31 | Stop reason: HOSPADM

## 2018-07-30 RX ORDER — ALBUTEROL SULFATE 1.25 MG/3ML
1.25 SOLUTION RESPIRATORY (INHALATION)
Status: DISCONTINUED | OUTPATIENT
Start: 2018-07-30 | End: 2018-07-31 | Stop reason: HOSPADM

## 2018-07-30 RX ORDER — DILTIAZEM HYDROCHLORIDE 240 MG/1
240 CAPSULE, COATED, EXTENDED RELEASE ORAL DAILY
Status: DISCONTINUED | OUTPATIENT
Start: 2018-07-30 | End: 2018-07-31 | Stop reason: HOSPADM

## 2018-07-30 RX ORDER — IPRATROPIUM BROMIDE AND ALBUTEROL SULFATE 2.5; .5 MG/3ML; MG/3ML
3 SOLUTION RESPIRATORY (INHALATION)
Status: DISCONTINUED | OUTPATIENT
Start: 2018-07-30 | End: 2018-07-31 | Stop reason: HOSPADM

## 2018-07-30 RX ORDER — BUDESONIDE 0.5 MG/2ML
500 INHALANT ORAL
Status: DISCONTINUED | OUTPATIENT
Start: 2018-07-30 | End: 2018-07-31 | Stop reason: HOSPADM

## 2018-07-30 RX ORDER — DILTIAZEM HYDROCHLORIDE 240 MG/1
240 CAPSULE, COATED, EXTENDED RELEASE ORAL DAILY
COMMUNITY
End: 2019-03-08 | Stop reason: SDUPTHER

## 2018-07-30 RX ORDER — SODIUM CHLORIDE FOR INHALATION 3 %
4 VIAL, NEBULIZER (ML) INHALATION
Status: DISCONTINUED | OUTPATIENT
Start: 2018-07-30 | End: 2018-07-31 | Stop reason: HOSPADM

## 2018-07-30 RX ORDER — FAMOTIDINE 20 MG/1
20 TABLET, FILM COATED ORAL DAILY
Status: DISCONTINUED | OUTPATIENT
Start: 2018-07-30 | End: 2018-07-31 | Stop reason: HOSPADM

## 2018-07-30 RX ADMIN — LORATADINE 10 MG: 10 TABLET ORAL at 12:19

## 2018-07-30 RX ADMIN — ESCITALOPRAM OXALATE 20 MG: 20 TABLET ORAL at 10:03

## 2018-07-30 RX ADMIN — MONTELUKAST SODIUM 10 MG: 10 TABLET, FILM COATED ORAL at 10:04

## 2018-07-30 RX ADMIN — SODIUM CHLORIDE 2.25 G: 900 INJECTION, SOLUTION INTRAVENOUS at 17:59

## 2018-07-30 RX ADMIN — METOPROLOL TARTRATE 100 MG: 50 TABLET ORAL at 21:31

## 2018-07-30 RX ADMIN — IPRATROPIUM BROMIDE AND ALBUTEROL SULFATE 3 ML: .5; 3 SOLUTION RESPIRATORY (INHALATION) at 20:50

## 2018-07-30 RX ADMIN — EXEMESTANE 25 MG: 25 TABLET, FILM COATED ORAL at 10:02

## 2018-07-30 RX ADMIN — METOPROLOL TARTRATE 100 MG: 50 TABLET ORAL at 10:03

## 2018-07-30 RX ADMIN — SODIUM CHLORIDE 2.25 G: 900 INJECTION, SOLUTION INTRAVENOUS at 21:31

## 2018-07-30 RX ADMIN — PERFLUTREN 2 ML: 6.52 INJECTION, SUSPENSION INTRAVENOUS at 09:25

## 2018-07-30 RX ADMIN — SODIUM CHLORIDE 75 ML/HR: 450 INJECTION, SOLUTION INTRAVENOUS at 10:02

## 2018-07-30 RX ADMIN — SODIUM CHLORIDE 2.25 G: 900 INJECTION, SOLUTION INTRAVENOUS at 10:01

## 2018-07-30 RX ADMIN — BUDESONIDE 500 MCG: 0.5 INHALANT RESPIRATORY (INHALATION) at 20:50

## 2018-07-30 RX ADMIN — FAMOTIDINE 20 MG: 20 TABLET ORAL at 12:19

## 2018-07-30 RX ADMIN — PREDNISONE 20 MG: 20 TABLET ORAL at 14:25

## 2018-07-30 RX ADMIN — ATORVASTATIN CALCIUM 40 MG: 40 TABLET, FILM COATED ORAL at 10:04

## 2018-07-30 RX ADMIN — DILTIAZEM HYDROCHLORIDE 240 MG: 240 CAPSULE, COATED, EXTENDED RELEASE ORAL at 10:03

## 2018-07-30 RX ADMIN — APIXABAN 5 MG: 5 TABLET, FILM COATED ORAL at 17:59

## 2018-07-30 RX ADMIN — SODIUM CHLORIDE SOLN NEBU 3% 4 ML: 3 NEBU SOLN at 20:51

## 2018-07-30 RX ADMIN — APIXABAN 5 MG: 5 TABLET, FILM COATED ORAL at 10:04

## 2018-07-30 RX ADMIN — MULTIPLE VITAMINS W/ MINERALS TAB 1 TABLET: TAB at 10:03

## 2018-07-30 NOTE — PROGRESS NOTES
0314- Bedside and Verbal shift change report given to Kellen Cardenas RN (oncoming nurse) by Yari Galeano RN (offgoing nurse). Report included the following information SBAR, Kardex, Intake/Output, MAR and Recent Results. Patient participated in report. 1915- Bedside and Verbal shift change report given to Griselda Celeste (oncoming nurse) by Kellen Cardenas RN (offgoing nurse). Report included the following information SBAR, Kardex, Intake/Output, MAR and Recent Results. Visitors present. Visitors at bedside.

## 2018-07-30 NOTE — CONSULTS
[facility]. Pulmonary, Critical Care, and Sleep Medicine    Initial Patient Consult    Name: Perla Salvador MRN: [id number]   : 1945 Hospital: [facility]   Date: 2018        This patient has been seen and evaluated at the request of Dr. Toy Penn for dyspnea.      IMPRESSION:   · Acute exacerbation of bronchiectasis with LRTI vs bronchitis vs mild COPD exac:   LLL atelectasis seen on CXR appears to be chronic in nature -- not consistent with PNA  · Hx of COPD and bronchiectasis (non-CF)  · Hx of chronic atelectasis in LLL  · Hx of PE:  Diagnosed in 2016, pt on eliquis chronically  · Hx of a-fib admitted with AICD discharge  · Hx of breast cancer and radiation  · Single episode of hemoptysis - occurred about a week ago - did not recur, etiology due to above along with being on NOAC therapy     Patient Active Problem List   Diagnosis Code    Hypertension I10    GERD (gastroesophageal reflux disease) K21.9    Coronary artery disease I25.10    Cardiomyopathy, idiopathic, EF now up to 55% I42.8    Left bundle branch block I44.7    Dyslipidemia E78.5    Biventricular implantable cardioverter-defibrillator in situ Z95.810    Glucose intolerance (impaired glucose tolerance) R73.02    Orthostatic hypotension I95.1    SOB (shortness of breath) R06.02    PAF (paroxysmal atrial fibrillation) (HCC) I48.0    Chest pain R07.9    Pulmonary embolism (HCC) I26.99    Pulmonary emboli (HCC) I26.99    AICD at end of battery life Z45.02    Bronchiectasis with acute lower respiratory infection ([address] J47.0    Ventricular tachyarrhythmia (HCC) I47.2      RECOMMENDATIONS:   · CT chest w/o contrast  · Hold home incruse  · Start scheduled bronchodilators duonebs TID with pulmicort BID (advise patient to rinse mouth after use)  · Perform aggressive bronchial hygiene with hypertonic saline nebs 3% TID along with PEP device therapy  · Supplemental oxygen to maintain SpO2 >88%.  Assess home Oxygen needs at discharge  · Attempt PO steroids with prednisone 20mg once daily x 5 days  · Antibiotics per primary service. If CT does not show consolidation, I would recommend rapid deescalation of ABx - consider to deescalate to Azithro 500mg once daily for total of 7 days  · Aspiration precautions  · Frequent incentive spirometry  · OT, PT, OOB and ambulate as tolerated  · Healthy weight  · Will Follow  · DVT  Prophylaxis per primary service     Subjective:     Patient is a 67 y.o. female with PMH of bronchiecatsis, COPD, a-fib with hx of AICD, presented to [facility] because her AICD discharged at home. Pt reports that she was at home and the AICD fired while she was in the bathroom. This similar event happened 2 weeks prior. Pt saw her Cardiologist and was started on Cardizem. Pt reports that about 2 weeks ago she also developed sx of increased cough - sputum production, associated with chest congestion. Pt reports she saw her PCP around that time and was given a course of Augmentin, she reports having continued greyish sputum and one episode of hemoptysis (which did not recur), so she presented back to her PCP who prescribed a course of Levaquin. Pt reports she was about 5 doses in before presenting to the ER. She notes she still has cough and congestion and her symptoms have not improved. Notes no subjective fevers or chills prior to ABx but has noted some since, denies night sweats, N/V/D, abdominal pain, LE swelling, orthopnea. Pt quit smoking in 1987, prior 1PPD smoker for about 20y      Past Medical History:   Diagnosis Date    CAD (coronary artery disease)     cardiomyopathy,CHF,,Cardiac Cath, pacemaker/defib.  Cancer [facility]) 2013    breast    Cardiac catheterization 02/27/2008    Patent coronary arteries. LVEDP 13 mmHg. EF 25-30%. Global hypk.  Cardiac echocardiogram 07/07/2016    EF 50% (prev 60-65% on study of 4/16/12). No WMA. Gr 1 DDfx. Normal RVSP.       Cardiac nuclear imaging test 05/31/2013    No evidence of ongoing ischemia or prior infarction. EF 60%. No RWMA. Mild dyssynchrony of inferior base & mid/distal septum likely c/w pacemaker. Nondiagnostic EKG on pharm stress test due to V-pacing.  Cardiovascular abdominal aorta duplex 09/23/2015    Fusiform AAA in prox & mid portions of abdom Ao.  Chronic lung disease     COPD (chronic obstructive pulmonary disease) (HCC) chronic bronchitis    Coronary artery disease Feb. 2008    Possible CAD with nonischemic dilated cardiomyopathy/EF 25%/ improved EF up to 56% by echocardiogram.    Coronary artery disease     Diabetes mellitus ([address]     GERD (gastroesophageal reflux disease)     Heart failure (HCC)     Heart murmur     Hemangioma of liver     Hx of cardiomyopathy ([address]     Hypercholesterolemia     Hypertension     Left bundle branch block     Phlebitis     PVD (peripheral vascular disease) ([address]     Renal calculi 1999    Thromboembolus ([address]     below knee in R leg      Past Surgical History:   Procedure Laterality Date    COLONOSCOPY N/A 3/7/2017    COLONOSCOPY, SURVEILLANCE with polypectomy performed by Kristin Jauregui MD at [address] [place] [address] HX BREAST LUMPECTOMY Left 8/2013    cancerous lesion    HX CATARACT REMOVAL Bilateral 1980s    w/ lens implants    HX CATARACT REMOVAL Left     re vised due to implant repositioning    HX HEART CATHETERIZATION  02/27/08    Patent coronary arteries, but severe LV dysfunction w/ EF in the 25-30% range.  HX KNEE ARTHROSCOPY Right     HX MASTECTOMY Left 9/24/2014    LEFT PARTIAL MASTECTOMY WITH NEEDLE LOCALIZATION MAMMOGRAM TIMES TWO performed by Karlie Sebastian MD at [address] [place] [address] HX PACEMAKER  May 2008    Biventricular ICD implantation     HX PACEMAKER  2012    replacement - Medtronic    HX PACEMAKER PLACEMENT      HX PELVIC LAPAROSCOPY      diagnostic - varicosities      Prior to Admission medications    Medication Sig Start Date End Date Taking? Authorizing Provider   dilTIAZem CD (CARDIZEM CD) 240 mg ER capsule Take 240 mg by mouth daily. Yes Historical Provider   loratadine (CLARITIN) 10 mg tablet Take 10 mg by mouth. Yes Historical Provider   montelukast (SINGULAIR) 10 mg tablet Take 10 mg by mouth daily. Indications: Allergic Rhinitis   Yes Historical Provider   metoprolol tartrate (LOPRESSOR) 100 mg IR tablet TAKE ONE TABLET BY MOUTH TWICE DAILY 2/9/18  Yes Yoni Kim NP   atorvastatin (LIPITOR) 40 mg tablet Take 1 Tab by mouth daily. 8/24/17  Yes Yoni Kim, NP   MULTIVIT-MIN/IRON/FOLIC/LUTEIN (CENTRUM SILVER WOMEN PO) Take  by mouth. Yes Historical Provider   ELIQUIS 5 mg tablet Take 1 Tab by mouth two (2) times a day. 3/13/17  Yes Tai Parra MD   escitalopram oxalate (LEXAPRO) 20 mg tablet Take 20 mg by mouth daily. 1/25/17  Yes Historical Provider   metFORMIN ER (GLUCOPHAGE XR) 500 mg tablet Take 500 mg by mouth two (2) times a day. 1/23/17  Yes Historical Provider   exemestane (AROMASIN) 25 mg tablet Take 25 mg by mouth daily. 3/10/15  Yes Historical Provider     Allergies   Allergen Reactions    Lisinopril Rash     Hypertensive crisis    Tetracycline Anaphylaxis, Rash and Swelling    Hibiclens [Chlorhexidine Gluconate] Other (comments)     Turned red and BP drop low.     Other Medication Rash and Swelling     -Myacins      Social History   Substance Use Topics    Smoking status: Former Smoker     Packs/day: 1.50     Years: 20.00     Types: Cigarettes     Quit date: 2/23/1988    Smokeless tobacco: Never Used    Alcohol use No      Family History   Problem Relation Age of Onset    Cancer Mother      Cancer of the breast    Stroke Mother     Cancer Father     Heart Disease Father      CHF    COPD Brother     Heart Attack Brother      Multiple    Heart Disease Brother      CHF        Current Facility-Administered Medications   Medication Dose Route Frequency    dilTIAZem CD (CARDIZEM CD) capsule 240 mg  240 mg Oral DAILY    0.45% sodium chloride infusion  75 mL/hr IntraVENous CONTINUOUS    piperacillin-tazobactam (ZOSYN) 2.25 g in 0.9% sodium chloride (MBP/ADV) 50 mL MBP  2.25 g IntraVENous Q6H    loratadine (CLARITIN) tablet 10 mg  10 mg Oral DAILY    famotidine (PEPCID) tablet 20 mg  20 mg Oral DAILY    albuterol-ipratropium (DUO-NEB) 2.5 MG-0.5 MG/3 ML  3 mL Nebulization TID RT    sodium chloride 3% hypertonic nebulizer soln  4 mL Nebulization TID RT    budesonide (PULMICORT) 500 mcg/2 ml nebulizer suspension  500 mcg Nebulization BID RT    atorvastatin (LIPITOR) tablet 40 mg  40 mg Oral DAILY    apixaban (ELIQUIS) tablet 5 mg  5 mg Oral BID    escitalopram oxalate (LEXAPRO) tablet 20 mg  20 mg Oral DAILY    exemestane (AROMASIN) tablet 25 mg  25 mg Oral DAILY    metoprolol tartrate (LOPRESSOR) tablet 100 mg  100 mg Oral Q12H    montelukast (SINGULAIR) tablet 10 mg  10 mg Oral DAILY    multivitamin, tx-iron-ca-min (THERA-M w/ IRON) tablet 1 Tab  1 Tab Oral DAILY    insulin lispro (HUMALOG) injection   SubCUTAneous AC&HS       Review of Systems:  A comprehensive ROS was performed as stated in the HPI, all others are negative  ROS    Objective:   Vital Signs:    Visit Vitals    BP 96/64    Pulse 64    Temp 98.1 °F (36.7 °C)    Resp 18    Ht 5' 5\" (1.651 m)    Wt 95.3 kg (210 lb)    SpO2 97%    BMI 34.95 kg/m2       O2 Device: Room air       Temp (24hrs), Av.7 °F (36.5 °C), Min:96.9 °F (36.1 °C), Max:98.2 °F (36.8 °C)       Intake/Output:   Last shift:         Last 3 shifts:  1901 -  0700  In: -   Out: 400 [Urine:400]    Intake/Output Summary (Last 24 hours) at 18 1303  Last data filed at 18 2215   Gross per 24 hour   Intake                0 ml   Output              400 ml   Net             -400 ml      Physical Exam:   General:  Alert, cooperative, no distress, appears stated age, laying in bed on room air, able to talk in long durations without any dyspnea   Head:  Normocephalic, without obvious abnormality, atraumatic. Eyes:  Conjunctivae/corneas clear. ANicteric, EOMI   Nose: Nares normal. Mucosa normal. No drainage or sinus tenderness. Throat: Lips, mucosa dry. NO thrush; Mallampati IV   Neck: Supple, symmetrical, trachea midline, no carotid bruit and no JVD. No crepitus   Back:   Symmetric, no curvature, no spine tenderness or flank pain   Lungs:   Bilateral auscultation revealed scattered rhonchi in left lung upper and lower, no wheezes or rales throughout, good air entry B/L   Chest wall:  No tenderness or deformity. NO CREPITUS   Heart:  Regular rate and rhythm, S1, S2 normal, no murmur, click, rub or gallop. Abdomen:   Soft, non-tender. Bowel sounds normal. No masses,  No organomegaly. No paradoxical motion   Extremities: normal, atraumatic, no cyanosis or edema. Skin: Skin color, texture, turgor normal. No rashes or lesions   Lymph nodes: Cervical, supraclavicular, and axillary nodes normal.   Neurologic: Grossly nonfocal, AAOx3, moves all extremities without deficit, no coordination defects grossly          Data review:   Labs:  Recent Results (from the past 24 hour(s))   CBC WITH AUTOMATED DIFF    Collection Time: 07/29/18  2:30 PM   Result Value Ref Range    WBC 13.1 4.6 - 13.2 K/uL    RBC 4.99 4.20 - 5.30 M/uL    HGB 14.0 12.0 - 16.0 g/dL    HCT 41.7 35.0 - 45.0 %    MCV 83.6 74.0 - 97.0 FL    MCH 28.1 24.0 - 34.0 PG    MCHC 33.6 31.0 - 37.0 g/dL    RDW 15.4 (H) 11.6 - 14.5 %    PLATELET 985 767 - 839 K/uL    MPV 9.5 9.2 - 11.8 FL    NEUTROPHILS 69 40 - 73 %    LYMPHOCYTES 21 21 - 52 %    MONOCYTES 7 3 - 10 %    EOSINOPHILS 2 0 - 5 %    BASOPHILS 1 0 - 2 %    ABS. NEUTROPHILS 9.2 (H) 1.8 - 8.0 K/UL    ABS. LYMPHOCYTES 2.8 0.9 - 3.6 K/UL    ABS. MONOCYTES 0.9 0.05 - 1.2 K/UL    ABS. EOSINOPHILS 0.3 0.0 - 0.4 K/UL    ABS.  BASOPHILS 0.1 0.0 - 0.1 K/UL    DF AUTOMATED     METABOLIC PANEL, COMPREHENSIVE    Collection Time: 07/29/18  2:30 PM Result Value Ref Range    Sodium 140 136 - 145 mmol/L    Potassium 3.8 3.5 - 5.5 mmol/L    Chloride 103 100 - 108 mmol/L    CO2 26 21 - 32 mmol/L    Anion gap 11 3.0 - 18 mmol/L    Glucose 114 (H) 74 - 99 mg/dL    BUN 16 7.0 - 18 MG/DL    Creatinine 1.55 (H) 0.6 - 1.3 MG/DL    BUN/Creatinine ratio 10 (L) 12 - 20      GFR est AA 40 (L) >60 ml/min/1.73m2    GFR est non-AA 33 (L) >60 ml/min/1.73m2    Calcium 9.5 8.5 - 10.1 MG/DL    Bilirubin, total 0.8 0.2 - 1.0 MG/DL    ALT (SGPT) 19 13 - 56 U/L    AST (SGOT) 16 15 - 37 U/L    Alk.  phosphatase 105 45 - 117 U/L    Protein, total 7.4 6.4 - 8.2 g/dL    Albumin 3.8 3.4 - 5.0 g/dL    Globulin 3.6 2.0 - 4.0 g/dL    A-G Ratio 1.1 0.8 - 1.7     CARDIAC PANEL,(CK, CKMB & TROPONIN)    Collection Time: 07/29/18  2:30 PM   Result Value Ref Range    CK 94 26 - 192 U/L    CK - MB 2.3 <3.6 ng/ml    CK-MB Index 2.4 0.0 - 4.0 %    Troponin-I, Qt. 0.27 (H) 0.0 - 0.045 NG/ML   MAGNESIUM    Collection Time: 07/29/18  2:30 PM   Result Value Ref Range    Magnesium 1.9 1.6 - 2.6 mg/dL   HEMOGLOBIN A1C WITH EAG    Collection Time: 07/29/18  2:30 PM   Result Value Ref Range    Hemoglobin A1c 6.7 (H) 4.2 - 5.6 %    Est. average glucose 146 mg/dL   EKG, 12 LEAD, INITIAL    Collection Time: 07/29/18  2:37 PM   Result Value Ref Range    Ventricular Rate 96 BPM    Atrial Rate 96 BPM    P-R Interval 112 ms    QRS Duration 104 ms    Q-T Interval 384 ms    QTC Calculation (Bezet) 485 ms    Calculated P Axis 38 degrees    Calculated R Axis -68 degrees    Calculated T Axis 84 degrees    Diagnosis       Normal sinus rhythm  Biventricular pacing    Confirmed by Raz Oh MD, Elena Fajardo ([id number]) on 7/29/2018 5:37:23 PM     CARDIAC PANEL,(CK, CKMB & TROPONIN)    Collection Time: 07/29/18  9:46 PM   Result Value Ref Range     26 - 192 U/L    CK - MB 2.3 <3.6 ng/ml    CK-MB Index 2.2 0.0 - 4.0 %    Troponin-I, Qt. 0.15 (H) 0.0 - 0.045 NG/ML   GLUCOSE, POC    Collection Time: 07/29/18  9:51 PM   Result Value Ref Range    Glucose (POC) 83 70 - 110 mg/dL   URINALYSIS W/ RFLX MICROSCOPIC    Collection Time: 07/29/18 10:39 PM   Result Value Ref Range    Color YELLOW      Appearance CLEAR      Specific gravity <1.005 1.003 - 1.030    pH (UA) 5.0 5.0 - 8.0      Protein NEGATIVE  NEG mg/dL    Glucose NEGATIVE  NEG mg/dL    Ketone NEGATIVE  NEG mg/dL    Bilirubin NEGATIVE  NEG      Blood NEGATIVE  NEG      Urobilinogen 0.2 0.2 - 1.0 EU/dL    Nitrites NEGATIVE  NEG      Leukocyte Esterase TRACE (A) NEG     URINE MICROSCOPIC ONLY    Collection Time: 07/29/18 10:39 PM   Result Value Ref Range    WBC 0 to 3 0 - 4 /hpf    RBC NONE 0 - 5 /hpf    Epithelial cells 1+ 0 - 5 /lpf    Bacteria NEGATIVE  NEG /hpf    Mucus 1+ (A) NEG /lpf    Hyaline cast 11 to 20 0 - 2 /lpf   CARDIAC PANEL,(CK, CKMB & TROPONIN)    Collection Time: 07/30/18  3:30 AM   Result Value Ref Range    CK 95 26 - 192 U/L    CK - MB 1.8 <3.6 ng/ml    CK-MB Index 1.9 0.0 - 4.0 %    Troponin-I, Qt. 0.11 (H) 0.0 - 0.045 NG/ML   ECHO ADULT COMPLETE    Collection Time: 07/30/18  9:24 AM   Result Value Ref Range    LA Volume 62.89 22 - 52 mL    Ao Root D 3.31 cm    LVIDd 4.90 3.9 - 5.3 cm    LVPWd 1.20 (A) 0.6 - 0.9 cm    LVIDs 3.63 cm    IVSd 1.22 (A) 0.6 - 0.9 cm    LVOT d 2.21 cm    LVOT Peak Velocity 101.09 cm/s    LVOT Peak Gradient 4.1 mmHg    LVOT VTI 20.37 cm    MV A Sterling 80.85 cm/s    MV E Sterling 0.53 cm/s    MV E/A 0.01     LA Vol 4C 51.49 22 - 52 mL    LA Vol 2C 67.83 (A) 22 - 52 mL    LA Area 4C 20.2 cm2    LV Mass .2 (A) 67 - 162 g    LV Mass AL Index 134.3 (A) g/m2    Mitral Valve E Wave Deceleration Time 269.1 ms    Triscuspid Valve Regurgitation Peak Gradient 37.3 mmHg    TR Max Velocity 305.36 cm/s    LA Vol Index 31.14 ml/m2    LA Vol Index 33.59 ml/m2    LA Vol Index 25.50 ml/m2   CARDIAC PANEL,(CK, CKMB & TROPONIN)    Collection Time: 07/30/18  9:54 AM   Result Value Ref Range    CK 99 26 - 192 U/L    CK - MB 1.7 <3.6 ng/ml    CK-MB Index 1.7 0.0 - 4.0 %    Troponin-I, Qt. 0.07 (H) 0.0 - 0.045 NG/ML   GLUCOSE, POC    Collection Time: 07/30/18 12:13 PM   Result Value Ref Range    Glucose (POC) 117 (H) 70 - 110 mg/dL     PFT Results  (Last 48 hours)    None        Echo Results  (Last 48 hours)    None        Imaging:  I have personally reviewed the patients radiographs and have reviewed the reports:  CXR Results  (Last 48 hours)               07/29/18 1424  XR CHEST PORT Final result    Impression:  IMPRESSION:       New mild left lateral lung base opacity with configuration suggesting   atelectasis. Infection is not excluded. No other change to prior. .       Narrative:  ONE VIEW CHEST RADIOGRAPH        INDICATION: Shortness of breath. Defibrillator fired this morning. Pneumonia. COMPARISON: 7/20/2018. FINDINGS:       The cardiomediastinal silhouette is within normal limits. . Stable ICD/pacing   leads The pulmonary vascular markings are unremarkable. Mild flat left lateral   lower lung opacity is more focal and dense than on prior. No pleural effusion or   pneumothorax. Evaluation of the osseous structures demonstrates no focal   abnormality. CT Results  (Last 48 hours)    None               Above mentioned total time spent on reviewing the case/medical record/data/notes/EMR/patient examination/documentation/coordinating care with nurse/consultants, exclusive of procedures with complex decision making performed and > 50% time spent in face to face evaluation.        Colin Gil MD/MPH     Pulmonary, Critical Care Medicine  [facility]

## 2018-07-30 NOTE — PROGRESS NOTES
met with patient, completed the initial Spiritual Assessment of the patient, and offered Pastoral Care, see flow sheets for interventions. Daughter was visiting and said she drove from [facility] today to visit. Pastoral support provided. Patient does not have any [place]ism/cultural needs that will affect patients preferences in health care. Chart reviewed. Chaplains will continue to follow and will provide pastoral care on an as needed/as requested basis. Seven Mccray MDiv. Board Certified [address] [place] [address] Office [phone]

## 2018-07-30 NOTE — ROUTINE PROCESS
Bedside shift change report given to Jose RN (oncoming nurse) by Cecilia Briones RN(offgoing nurse). Report included the following information SBAR, Kardex and Recent Results.

## 2018-07-30 NOTE — H&P
History & Physical 
 
Patient: Leonardo Grover MRN: [id number]  CSN: [id number] YOB: 1945  Age: 67 y.o. Sex: female DOA: 7/29/2018 Chief Complaint:  
Chief Complaint Patient presents with  
 Other  
  defibrilator firing HPI:  
 
Leonardo Grover is a 67 y.o.  female who has  hx of CAD, COPD, DM, HTN, HLD and Medtronic pacemakerwho presents to ED after acute defibrillator firing Pt reported that  over the last two weeks her defibrillator has fired twice (once 2 weeks ago on the 16th) and then again before admission while she was sitting on the toilet after urinating. Pt reports associated sx of coughing with an episode of emesis as well as left sided CP 2 nights before admission  radiating to the central chest while in bed watching tv that spontaneously resolved after 10 minutes. Pt has chronic cough has been f/u pulmonary Dr Lien Malik has been on albuterol prn . Pt also has h/o COPD and cardiomyopathy . Pt was treated recently for URI question pneumonia . She was treated with Augmentin and Levaquin she supposed to take last dose of Levaquin . Pt still feeling congested coughing no significant phlegm production Pt has H/O breast cancer s/p surgery and has been f/u [facility]   for chronic Seroma. Pt had Ct scan chest may 2016 after diagnosis of PE and was resolved at this time pt has been on Eliquis by cardiology after her PE due to PAF Pt seen by Dr Nolan Rosenberg echo was done this morning . Discussed with Dr Buddy Javier he will see the patient for her chronic cough and question pneumonia Lt mid lung Past Medical History:  
Diagnosis Date  CAD (coronary artery disease)   
 cardiomyopathy,CHF,,Cardiac Cath, pacemaker/defib.  Cancer [facility]) 2013  
 breast  
 Cardiac catheterization 02/27/2008 Patent coronary arteries. LVEDP 13 mmHg. EF 25-30%. Global hypk.  Cardiac echocardiogram 07/07/2016  EF 50% (prev 60-65% on study of 4/16/12). No WMA. Gr 1 DDfx. Normal RVSP.  Cardiac nuclear imaging test 05/31/2013 No evidence of ongoing ischemia or prior infarction. EF 60%. No RWMA. Mild dyssynchrony of inferior base & mid/distal septum likely c/w pacemaker. Nondiagnostic EKG on pharm stress test due to V-pacing.  Cardiovascular abdominal aorta duplex 09/23/2015 Fusiform AAA in prox & mid portions of abdom Ao.  Chronic lung disease  COPD (chronic obstructive pulmonary disease) (HCC) chronic bronchitis  Coronary artery disease Feb. 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  
 Coronary artery disease  Diabetes mellitus ([address]  GERD (gastroesophageal reflux disease)  Heart failure ([address]  Heart murmur  Hemangioma of liver  Hx of cardiomyopathy ([address]  Hypercholesterolemia  Hypertension  Left bundle branch block  Phlebitis  PVD (peripheral vascular disease) ([address]  Renal calculi 1999  Thromboembolus ([address] below knee in R leg Past Surgical History:  
Procedure Laterality Date  COLONOSCOPY N/A 3/7/2017 COLONOSCOPY, SURVEILLANCE with polypectomy performed by Ellis Eden MD at [address] [place] [address] HX BREAST LUMPECTOMY Left 8/2013  
 cancerous lesion  HX CATARACT REMOVAL Bilateral 1980s w/ lens implants  HX CATARACT REMOVAL Left   
 re vised due to implant repositioning  HX HEART CATHETERIZATION  02/27/08 Patent coronary arteries, but severe LV dysfunction w/ EF in the 25-30% range.  HX KNEE ARTHROSCOPY Right  HX MASTECTOMY Left 9/24/2014 LEFT PARTIAL MASTECTOMY WITH NEEDLE LOCALIZATION MAMMOGRAM TIMES TWO performed by Gerald Keating MD at [address] [place] [address] HX PACEMAKER  May 2008 Biventricular ICD implantation  HX PACEMAKER  2012  
 replacement - Medtronic  HX PACEMAKER PLACEMENT    
 HX PELVIC LAPAROSCOPY    
 diagnostic - varicosities Family History Problem Relation Age of Onset  Cancer Mother Cancer of the breast  
 Stroke Mother  Cancer Father  Heart Disease Father CHF  COPD Brother  Heart Attack Brother Multiple  Heart Disease Brother CHF Social History Social History  Marital status:  Spouse name: N/A  
 Number of children: N/A  
 Years of education: N/A Social History Main Topics  Smoking status: Former Smoker Packs/day: 1.50 Years: 20.00 Types: Cigarettes Quit date: 2/23/1988  Smokeless tobacco: Never Used  Alcohol use No  
 Drug use: No  
 Sexual activity: Not on file Other Topics Concern  Not on file Social History Narrative Prior to Admission medications Medication Sig Start Date End Date Taking? Authorizing Provider  
dilTIAZem CD (CARDIZEM CD) 240 mg ER capsule Take 240 mg by mouth daily. Yes Historical Provider  
loratadine (CLARITIN) 10 mg tablet Take 10 mg by mouth. Yes Historical Provider  
montelukast (SINGULAIR) 10 mg tablet Take 10 mg by mouth daily. Indications: Allergic Rhinitis   Yes Historical Provider  
metoprolol tartrate (LOPRESSOR) 100 mg IR tablet TAKE ONE TABLET BY MOUTH TWICE DAILY 2/9/18  Yes Eleonora Parish NP  
atorvastatin (LIPITOR) 40 mg tablet Take 1 Tab by mouth daily. 8/24/17  Yes Eleonora Parish NP  
MULTIVIT-MIN/IRON/FOLIC/LUTEIN (CENTRUM SILVER WOMEN PO) Take  by mouth. Yes Historical Provider ELIQUIS 5 mg tablet Take 1 Tab by mouth two (2) times a day. 3/13/17  Yes Reji Staton MD  
escitalopram oxalate (LEXAPRO) 20 mg tablet Take 20 mg by mouth daily. 1/25/17  Yes Historical Provider  
metFORMIN ER (GLUCOPHAGE XR) 500 mg tablet Take 500 mg by mouth two (2) times a day. 1/23/17  Yes Historical Provider  
exemestane (AROMASIN) 25 mg tablet Take 25 mg by mouth daily. 3/10/15  Yes Historical Provider Allergies Allergen Reactions  Lisinopril Rash Hypertensive crisis  Tetracycline Anaphylaxis, Rash and Swelling  Hibiclens [Chlorhexidine Gluconate] Other (comments) Turned red and BP drop low.  Other Medication Rash and Swelling  
  -Myacins Review of Systems GENERAL: Patient alert, awake and oriented times 3, able to communicate full sentences and not in distress. HEENT: No change in vision, no earache, tinnitus, sore throat or sinus congestion. NECK: No pain or stiffness. CARDIOVASCULAR: No chest pain or pressure. No palpitations after admission PULMONARY: No shortness of breath,  Positive cough no  wheeze. GASTROINTESTINAL: No abdominal pain, nausea, vomiting or diarrhea, melena or       bright red blood per rectum. GENITOURINARY: No urinary frequency, urgency, hesitancy or dysuria. MUSCULOSKELETAL:  Chronic arthritis pain knee pain on and off DERMATOLOGIC: No rash, no itching, no lesions. ENDOCRINE: No polyuria, polydipsia, no heat or cold intolerance. No recent change in    weight. HEMATOLOGICAL: No anemia no r easy bruising or bleeding. NEUROLOGIC: positive headache, seizures, numbness, tingling or weakness. PSYCHIATRIC: H/O  depression,  No anxiety, no other  mood disorder, no loss of interest in normal       activity or change in sleep pattern. Physical Exam:  
 
Physical Exam: 
Visit Vitals  BP 96/64  Pulse 60  Temp 97.9 °F (36.6 °C)  Resp 16  
 Ht 5' 5\" (1.651 m)  Wt 95.3 kg (210 lb)  SpO2 96%  BMI 34.95 kg/m2 O2 Device: Room air Temp (24hrs), Av.6 °F (36.4 °C), Min:96.9 °F (36.1 °C), Max:98.2 °F (36.8 °C) 
      1901 -  0700 In: -  
Out: 400 [Urine:400] General:  Alert, cooperative, no distress, appears stated age. Head:  Normocephalic, without obvious abnormality, atraumatic. Eyes:  Conjunctivae/corneas clear. PERRL, EOMs intact. Nose: Nares normal. No drainage or sinus tenderness. Throat: Lips, mucosa, and tongue dry Neck: Supple, symmetrical, trachea midline, no adenopathy, thyroid: no enlargement/tenderness/nodules, no carotid bruit and no JVD. Back:   ROM normal. No CVA tenderness. Lungs:   Clear to auscultation bilaterally. Chest wall:  No tenderness or deformity. Heart:  Regular rate and rhythm, S1, S2 normal, no murmur, click, rub or gallop. Abdomen: Soft, non-tender. Bowel sounds normal. No masses,  No organomegaly. Extremities: Extremities normal, atraumatic, no cyanosis or edema. Pulses: 2+ and symmetric all extremities. Skin: Skin color, texture, turgor normal. No rashes or lesions Neurologic: CNII-XII intact. No focal motor or sensory deficit. Labs Reviewed: All lab results for the last 24 hours reviewed. CXR and EKG Procedures/imaging: see electronic medical records for all procedures/Xrays and details which were not copied into this note but were reviewed prior to creation of Plan Assessment/Plan Active Problems: Hypertension () GERD (gastroesophageal reflux disease) () Left bundle branch block (8/23/2011) Biventricular implantable cardioverter-defibrillator in situ (8/23/2011) PAF (paroxysmal atrial fibrillation) [facility] (5/14/2013) Ventricular tachyarrhythmia ([facility] (7/29/2018) Plan H/O Cardiomyopathy last EJF improved to 55 %. S/p AICD dicharge twice for the last 2 weeks h/o PAF , elevated troponin I on admission possible underlying CAD. PAF Pt seen by cardiology echo was ordered this morning Troponin I trending down Pt had cardiac catgh 2008 no critical disease Pt started on Cardizem and lopressor Bp is running low Will f/u with cardiology Chronic cough/ COPD/ question Pneumonia H/O DVT and PE Last Ct scan 9/12/161. No convincing CT evidence of pulmonary embolism. Interval resolution of multifocal right sided PE seen on prior CT Pt was treated with augmentin for 7 days and Levaquin for 10 days last dose supposed to be today Repeat CXR 7/29 question pneumonia mid Lt lung Will start zosyn and discussed with dr Shanda Cain he will see pt might need ct scan chest 
 
Acute renal insufficiency most likely due to hydration Start IV fluid 1/2 ns 75 cc /h Check urine micro albumin Check renal ultrasound 
continue to monitor lab Diabetes type 2 HG A1c 6.7 Hold Glucophage monitor glucose level with humalog sliding scale Depression 
continue Lexapro  
continue to monitor Hyperlipidemia 
continue Lipitor F/u Liver function H/O Lt  breast cancer S/P  Lumpectomy surgery  And radiation chronic Seroma Pt has been f/u surgery and oncology at [facility] Might need ct scan chest discussed with pulmoanry Dr Bong Ferguson Seasonal allergies Continue Claritin and  And Singulair Out of bed to chair with assistnat Cbc, cmp, mag in AM 
 
DVT/GI Prophylaxis: Janice Carlson MD 
7/30/2018 
10:01 AM 
 normal

## 2018-07-30 NOTE — INTERDISCIPLINARY ROUNDS
IDR/SLIDR Summary Patient: Rosales Zapata MRN: [id number]    Age: 67 y.o. YOB: 1945 Room/Bed: [facility] Admit Diagnosis: Ventricular tachyarrhythmia (HCC)  Principal Diagnosis: <principal problem not specified>  
Goals: Cardio consult Readmission: NO  Quality Measure: Not applicable VTE Prophylaxis: Not needed Influenza Vaccine screening completed? YES Pneumococcal Vaccine screening completed? YES Mobility needs: No   Nutrition plan:No 
Consults:   
Financial concerns:No  Escalated to ? NO 
RRAT Score: 2   Interventions: 
Testing due for pt today? NO 
LOS: 1 days Expected length of stay  2 days Discharge plan: Home   PCP: Leyda Espana MD 
Transportation needs: No   
Days before discharge:two or more days before discharge Discharge disposition: Home Signed:  
 
Mandy Amado RN 
7/30/2018 
3:58 AM

## 2018-07-30 NOTE — PROGRESS NOTES
[facility] - Progress Note Admit Date: 7/29/2018 Assessment:  
 
Hospital Problems  Date Reviewed: 7/30/2018 Codes Class Noted POA Ventricular tachyarrhythmia ([address] ICD-10-CM: I47.2 ICD-9-CM: 427.1  7/29/2018 Yes PAF (paroxysmal atrial fibrillation) (HCC) ICD-10-CM: I48.0 ICD-9-CM: 427.31  5/14/2013 Yes Left bundle branch block ICD-10-CM: I44.7 ICD-9-CM: 426.3  8/23/2011 Yes Biventricular implantable cardioverter-defibrillator in situ ICD-10-CM: Z95.810 ICD-9-CM: V45.02  8/23/2011 Yes Hypertension ICD-10-CM: I10 
ICD-9-CM: 401.9  Unknown Yes GERD (gastroesophageal reflux disease) ICD-10-CM: K21.9 ICD-9-CM: 530.81  Unknown Yes  
   
  
 
 
-AICD discharge. Review of device suggests A.fib/RVR. Similar episode 2 weeks ago. She was switched from lopressor to Cardizem 240 at that time. -h/o paroxysmal atrial fibrillation on Eliquis as well as lopressor 100 mg BID and recently started Cardizem 240 mg daily 
-Indeterminate troponin of 0.2, unlikely ACS 
-Possible bronchitis, recently treated with antbx, h/o recurrent bronchitis in the past followed by Dr. Alessandra Ashley 
-Clinical dehydration, elevated Cr of 1.55 at admission 
-Medtronic bivaicd. Placed 2008, changeout 2012 and last April 2017.  Initially placed for primary prevention and heart failure at that time. -h/o transient NICM with EF 25-30% in 2008, 50% by echo July 2016. Cath 2008 without critical disease. No recent echo. -Chronic class II diastolic heart failure, compensated, not on diuretics as outpatient 
-h/o DVT May 2016 on Eliquis 
-Diabetes 
-Remote breast cancer 
-Recent melena, s/p EGD/colonscopy 3/7/17 -Life expectancy > 1 year 
-Baseline LBBB with QRS > 120 msec 
-Chronic BB therapy 
-Unable to tolerate ARB/ACEI due to hypotension 
-Retired nurse 
  
Primary cardiologist Dr. Noland Tamy Plan:  
 
Rhythm stable. Continued on cardizem and lopressor as BP tolerates.  Will discuss sotalol, digoxin with primary cardiologist Dr. Jhonny Sweet. Continued on Eliquis for anticoagulation. Will review echocardiogram once available. Continue pulmonary treatment and support. Subjective:  
 
Breathing improving. Tele v pacing intermittent NSVT, 
 
Objective:  
  
Patient Vitals for the past 8 hrs: 
 Temp Pulse Resp BP SpO2  
07/30/18 0832 - - - 96/64 -  
07/30/18 0800 98.1 °F (36.7 °C) 64 18 98/66 97 % Patient Vitals for the past 96 hrs: 
 Weight  
07/30/18 0832 95.3 kg (210 lb)  
07/29/18 1408 95.3 kg (210 lb) Intake/Output Summary (Last 24 hours) at 07/30/18 1351 Last data filed at 07/29/18 2215 Gross per 24 hour Intake                0 ml Output              400 ml Net             -400 ml Physical Exam: 
General:  alert, cooperative, no distress, appears stated age Neck:  no JVD Lungs:  rhonchi scattered Heart:  regular rate and rhythm Abdomen:  abdomen is soft without significant tenderness, masses, organomegaly or guarding Extremities:  extremities normal, atraumatic, no cyanosis or edema Data Review:  
 
Labs: Results:  
   
Chemistry Recent Labs  
   07/29/18 [place] GLU  114* NA  140  
K  3.8 CL  103 CO2  26 BUN  16  
CREA  1.55* CA  9.5 MG  1.9 AGAP  11 BUCR  10* AP  105  
TP  7.4 ALB  3.8 GLOB  3.6 AGRAT  1.1  
  
CBC w/Diff Recent Labs  
   07/29/18 [place] WBC  13.1 RBC  4.99  
HGB  14.0  
HCT  41.7 PLT  325 GRANS  69 LYMPH  21 EOS  2 Cardiac Enzymes Lab Results Component Value Date/Time  CPK 99 07/30/2018 09:54 AM  
 CPK 95 07/30/2018 03:30 AM  
  07/29/2018 09:46 PM  
 CPK 94 07/29/2018 02:30 PM  
 CKMB 1.7 07/30/2018 09:54 AM  
 CKMB 1.8 07/30/2018 03:30 AM  
 CKMB 2.3 07/29/2018 09:46 PM  
 CKMB 2.3 07/29/2018 02:30 PM  
 CKND1 1.7 07/30/2018 09:54 AM  
 CKND1 1.9 07/30/2018 03:30 AM  
 CKND1 2.2 07/29/2018 09:46 PM  
 CKND1 2.4 07/29/2018 02:30 PM  
 TROIQ 0.07 (H) 07/30/2018 09:54 AM TROIQ 0.11 (H) 07/30/2018 03:30 AM  
 TROIQ 0.15 (H) 07/29/2018 09:46 PM  
 TROIQ 0.27 (H) 07/29/2018 02:30 PM  
  
Coagulation No results for input(s): PTP, INR, APTT in the last 72 hours. No lab exists for component: INREXT Lipid Panel Lab Results Component Value Date/Time Cholesterol, total 187 10/05/2012 12:00 AM  
 HDL Cholesterol 49 10/05/2012 12:00 AM  
 LDL, calculated 110 (H) 10/05/2012 12:00 AM  
 VLDL, calculated 28 10/05/2012 12:00 AM  
 Triglyceride 140 10/05/2012 12:00 AM  
  
BNP Lab Results Component Value Date/Time  
 B-type Natriuretic Peptide 58.4 04/16/2012 12:00 AM  
  
Liver Enzymes Recent Labs  
   07/29/18 [place] TP  7.4 ALB  3.8 AP  105 SGOT  16  
  
Digoxin Thyroid Studies Lab Results Component Value Date/Time TSH 2.060 10/05/2012 12:00 AM  
    
 
Signed By: JULISSA Prieto   
 July 30, 2018

## 2018-07-31 VITALS
HEART RATE: 60 BPM | DIASTOLIC BLOOD PRESSURE: 79 MMHG | TEMPERATURE: 97.5 F | BODY MASS INDEX: 34.99 KG/M2 | RESPIRATION RATE: 19 BRPM | SYSTOLIC BLOOD PRESSURE: 138 MMHG | WEIGHT: 210 LBS | OXYGEN SATURATION: 94 % | HEIGHT: 65 IN

## 2018-07-31 PROBLEM — N17.9 AKI (ACUTE KIDNEY INJURY) (HCC): Status: ACTIVE | Noted: 2018-07-31

## 2018-07-31 PROBLEM — J40 BRONCHITIS: Status: ACTIVE | Noted: 2018-07-31

## 2018-07-31 LAB
ALBUMIN SERPL-MCNC: 3.6 G/DL (ref 3.4–5)
ALBUMIN/GLOB SERPL: 1.1 {RATIO} (ref 0.8–1.7)
ALP SERPL-CCNC: 97 U/L (ref 45–117)
ALT SERPL-CCNC: 22 U/L (ref 13–56)
ANION GAP SERPL CALC-SCNC: 10 MMOL/L (ref 3–18)
ANION GAP SERPL CALC-SCNC: 8 MMOL/L (ref 3–18)
AST SERPL-CCNC: 16 U/L (ref 15–37)
BASOPHILS # BLD: 0 K/UL (ref 0–0.1)
BASOPHILS NFR BLD: 1 % (ref 0–2)
BILIRUB SERPL-MCNC: 0.6 MG/DL (ref 0.2–1)
BUN SERPL-MCNC: 22 MG/DL (ref 7–18)
BUN SERPL-MCNC: 23 MG/DL (ref 7–18)
BUN/CREAT SERPL: 15 (ref 12–20)
BUN/CREAT SERPL: 19 (ref 12–20)
CALCIUM SERPL-MCNC: 9.1 MG/DL (ref 8.5–10.1)
CALCIUM SERPL-MCNC: 9.4 MG/DL (ref 8.5–10.1)
CHLORIDE SERPL-SCNC: 105 MMOL/L (ref 100–108)
CHLORIDE SERPL-SCNC: 106 MMOL/L (ref 100–108)
CO2 SERPL-SCNC: 27 MMOL/L (ref 21–32)
CO2 SERPL-SCNC: 27 MMOL/L (ref 21–32)
CREAT SERPL-MCNC: 1.14 MG/DL (ref 0.6–1.3)
CREAT SERPL-MCNC: 1.53 MG/DL (ref 0.6–1.3)
DIFFERENTIAL METHOD BLD: ABNORMAL
EOSINOPHIL # BLD: 0 K/UL (ref 0–0.4)
EOSINOPHIL NFR BLD: 0 % (ref 0–5)
ERYTHROCYTE [DISTWIDTH] IN BLOOD BY AUTOMATED COUNT: 15.5 % (ref 11.6–14.5)
GLOBULIN SER CALC-MCNC: 3.2 G/DL (ref 2–4)
GLUCOSE BLD STRIP.AUTO-MCNC: 110 MG/DL (ref 70–110)
GLUCOSE BLD STRIP.AUTO-MCNC: 124 MG/DL (ref 70–110)
GLUCOSE SERPL-MCNC: 125 MG/DL (ref 74–99)
GLUCOSE SERPL-MCNC: 171 MG/DL (ref 74–99)
HCT VFR BLD AUTO: 38.1 % (ref 35–45)
HGB BLD-MCNC: 12.4 G/DL (ref 12–16)
IGG SERPL-MCNC: 506 MG/DL (ref 700–1600)
IGG1 SER-MCNC: 298 MG/DL (ref 248–810)
IGG2 SER-MCNC: 192 MG/DL (ref 130–555)
IGG3 SER-MCNC: 39 MG/DL (ref 15–102)
IGG4 SER-MCNC: 22 MG/DL (ref 2–96)
LYMPHOCYTES # BLD: 1.4 K/UL (ref 0.9–3.6)
LYMPHOCYTES NFR BLD: 17 % (ref 21–52)
MAGNESIUM SERPL-MCNC: 2.3 MG/DL (ref 1.6–2.6)
MCH RBC QN AUTO: 27.7 PG (ref 24–34)
MCHC RBC AUTO-ENTMCNC: 32.5 G/DL (ref 31–37)
MCV RBC AUTO: 85 FL (ref 74–97)
MONOCYTES # BLD: 0.5 K/UL (ref 0.05–1.2)
MONOCYTES NFR BLD: 6 % (ref 3–10)
NEUTS SEG # BLD: 6.3 K/UL (ref 1.8–8)
NEUTS SEG NFR BLD: 76 % (ref 40–73)
PLATELET # BLD AUTO: 291 K/UL (ref 135–420)
PMV BLD AUTO: 9.7 FL (ref 9.2–11.8)
POTASSIUM SERPL-SCNC: 4.1 MMOL/L (ref 3.5–5.5)
POTASSIUM SERPL-SCNC: 4.6 MMOL/L (ref 3.5–5.5)
PROT SERPL-MCNC: 6.8 G/DL (ref 6.4–8.2)
RBC # BLD AUTO: 4.48 M/UL (ref 4.2–5.3)
SODIUM SERPL-SCNC: 141 MMOL/L (ref 136–145)
SODIUM SERPL-SCNC: 142 MMOL/L (ref 136–145)
WBC # BLD AUTO: 8.3 K/UL (ref 4.6–13.2)

## 2018-07-31 PROCEDURE — 80053 COMPREHEN METABOLIC PANEL: CPT | Performed by: FAMILY MEDICINE

## 2018-07-31 PROCEDURE — 74011250636 HC RX REV CODE- 250/636: Performed by: FAMILY MEDICINE

## 2018-07-31 PROCEDURE — 74011000258 HC RX REV CODE- 258: Performed by: FAMILY MEDICINE

## 2018-07-31 PROCEDURE — 85025 COMPLETE CBC W/AUTO DIFF WBC: CPT | Performed by: FAMILY MEDICINE

## 2018-07-31 PROCEDURE — 74011250637 HC RX REV CODE- 250/637: Performed by: FAMILY MEDICINE

## 2018-07-31 PROCEDURE — 83735 ASSAY OF MAGNESIUM: CPT | Performed by: FAMILY MEDICINE

## 2018-07-31 PROCEDURE — 74011636637 HC RX REV CODE- 636/637: Performed by: INTERNAL MEDICINE

## 2018-07-31 PROCEDURE — 94640 AIRWAY INHALATION TREATMENT: CPT

## 2018-07-31 PROCEDURE — 36415 COLL VENOUS BLD VENIPUNCTURE: CPT | Performed by: FAMILY MEDICINE

## 2018-07-31 PROCEDURE — 74011000250 HC RX REV CODE- 250: Performed by: INTERNAL MEDICINE

## 2018-07-31 PROCEDURE — 82962 GLUCOSE BLOOD TEST: CPT

## 2018-07-31 RX ORDER — AZITHROMYCIN 500 MG/1
500 TABLET, FILM COATED ORAL DAILY
Qty: 5 TAB | Refills: 0 | Status: SHIPPED | OUTPATIENT
Start: 2018-07-31 | End: 2018-08-05

## 2018-07-31 RX ORDER — FLUTICASONE FUROATE AND VILANTEROL 100; 25 UG/1; UG/1
1 POWDER RESPIRATORY (INHALATION) DAILY
Qty: 1 INHALER | Refills: 0 | Status: SHIPPED | OUTPATIENT
Start: 2018-07-31 | End: 2018-08-28 | Stop reason: ALTCHOICE

## 2018-07-31 RX ORDER — FLUTICASONE PROPIONATE 50 MCG
2 SPRAY, SUSPENSION (ML) NASAL DAILY
Status: DISCONTINUED | OUTPATIENT
Start: 2018-07-31 | End: 2018-07-31 | Stop reason: HOSPADM

## 2018-07-31 RX ORDER — SODIUM CHLORIDE 9 MG/ML
150 INJECTION, SOLUTION INTRAVENOUS CONTINUOUS
Status: DISPENSED | OUTPATIENT
Start: 2018-07-31 | End: 2018-07-31

## 2018-07-31 RX ORDER — PREDNISONE 20 MG/1
20 TABLET ORAL
Qty: 3 TAB | Refills: 0 | Status: SHIPPED | OUTPATIENT
Start: 2018-08-01 | End: 2018-08-28 | Stop reason: ALTCHOICE

## 2018-07-31 RX ORDER — FLUTICASONE PROPIONATE 50 MCG
2 SPRAY, SUSPENSION (ML) NASAL DAILY
Qty: 1 BOTTLE | Refills: 0 | Status: SHIPPED | OUTPATIENT
Start: 2018-07-31 | End: 2021-06-14

## 2018-07-31 RX ADMIN — SODIUM CHLORIDE SOLN NEBU 3% 4 ML: 3 NEBU SOLN at 08:06

## 2018-07-31 RX ADMIN — MULTIPLE VITAMINS W/ MINERALS TAB 1 TABLET: TAB at 10:14

## 2018-07-31 RX ADMIN — FLUTICASONE PROPIONATE 2 SPRAY: 50 SPRAY, METERED NASAL at 11:00

## 2018-07-31 RX ADMIN — BUDESONIDE 500 MCG: 0.5 INHALANT RESPIRATORY (INHALATION) at 08:06

## 2018-07-31 RX ADMIN — PREDNISONE 20 MG: 20 TABLET ORAL at 10:14

## 2018-07-31 RX ADMIN — APIXABAN 5 MG: 5 TABLET, FILM COATED ORAL at 10:13

## 2018-07-31 RX ADMIN — MONTELUKAST SODIUM 10 MG: 10 TABLET, FILM COATED ORAL at 10:13

## 2018-07-31 RX ADMIN — ATORVASTATIN CALCIUM 40 MG: 40 TABLET, FILM COATED ORAL at 10:14

## 2018-07-31 RX ADMIN — ESCITALOPRAM OXALATE 20 MG: 20 TABLET ORAL at 10:13

## 2018-07-31 RX ADMIN — SODIUM CHLORIDE 2.25 G: 900 INJECTION, SOLUTION INTRAVENOUS at 04:23

## 2018-07-31 RX ADMIN — SODIUM CHLORIDE 75 ML/HR: 450 INJECTION, SOLUTION INTRAVENOUS at 04:23

## 2018-07-31 RX ADMIN — SODIUM CHLORIDE 150 ML/HR: 900 INJECTION, SOLUTION INTRAVENOUS at 10:16

## 2018-07-31 RX ADMIN — EXEMESTANE 25 MG: 25 TABLET, FILM COATED ORAL at 10:12

## 2018-07-31 RX ADMIN — LORATADINE 10 MG: 10 TABLET ORAL at 10:12

## 2018-07-31 RX ADMIN — IPRATROPIUM BROMIDE AND ALBUTEROL SULFATE 3 ML: .5; 3 SOLUTION RESPIRATORY (INHALATION) at 08:06

## 2018-07-31 RX ADMIN — DILTIAZEM HYDROCHLORIDE 240 MG: 240 CAPSULE, COATED, EXTENDED RELEASE ORAL at 10:14

## 2018-07-31 RX ADMIN — SODIUM CHLORIDE 2.25 G: 900 INJECTION, SOLUTION INTRAVENOUS at 10:14

## 2018-07-31 RX ADMIN — FAMOTIDINE 20 MG: 20 TABLET ORAL at 10:14

## 2018-07-31 NOTE — ROUTINE PROCESS
Bedside shift change report given to Jose (oncoming nurse) by Cecilia Briones RN(offgoing nurse). Report included the following information SBAR, Kardex and Recent Results.

## 2018-07-31 NOTE — DISCHARGE INSTRUCTIONS
Atrial Fibrillation: Care Instructions  Your Care Instructions    Atrial fibrillation is an irregular and often fast heartbeat. Treating this condition is important for several reasons. It can cause blood clots, which can travel from your heart to your brain and cause a stroke. If you have a fast heartbeat, you may feel lightheaded, dizzy, and weak. An irregular heartbeat can also increase your risk for heart failure. Atrial fibrillation is often the result of another heart condition, such as high blood pressure or coronary artery disease. Making changes to improve your heart condition will help you stay healthy and active. Follow-up care is a key part of your treatment and safety. Be sure to make and go to all appointments, and call your doctor if you are having problems. It's also a good idea to know your test results and keep a list of the medicines you take. How can you care for yourself at home? Medicines    · Take your medicines exactly as prescribed. Call your doctor if you think you are having a problem with your medicine. You will get more details on the specific medicines your doctor prescribes.     · If your doctor has given you a blood thinner to prevent a stroke, be sure you get instructions about how to take your medicine safely. Blood thinners can cause serious bleeding problems.     · Do not take any vitamins, over-the-counter drugs, or herbal products without talking to your doctor first.    Lifestyle changes    · Do not smoke. Smoking can increase your chance of a stroke and heart attack. If you need help quitting, talk to your doctor about stop-smoking programs and medicines. These can increase your chances of quitting for good.     · Eat a heart-healthy diet.     · Stay at a healthy weight. Lose weight if you need to.     · Limit alcohol to 2 drinks a day for men and 1 drink a day for women. Too much alcohol can cause health problems.     · Avoid colds and flu.  Get a pneumococcal vaccine shot. If you have had one before, ask your doctor whether you need another dose. Get a flu shot every year. If you must be around people with colds or flu, wash your hands often. Activity    · If your doctor recommends it, get more exercise. Walking is a good choice. Bit by bit, increase the amount you walk every day. Try for at least 30 minutes on most days of the week. You also may want to swim, bike, or do other activities. Your doctor may suggest that you join a cardiac rehabilitation program so that you can have help increasing your physical activity safely.     · Start light exercise if your doctor says it is okay. Even a small amount will help you get stronger, have more energy, and manage stress. Walking is an easy way to get exercise. Start out by walking a little more than you did in the hospital. Gradually increase the amount you walk.     · When you exercise, watch for signs that your heart is working too hard. You are pushing too hard if you cannot talk while you are exercising. If you become short of breath or dizzy or have chest pain, sit down and rest immediately.     · Check your pulse regularly. Place two fingers on the artery at the palm side of your wrist, in line with your thumb. If your heartbeat seems uneven or fast, talk to your doctor. When should you call for help? Call 911 anytime you think you may need emergency care. For example, call if:    · You have symptoms of a heart attack. These may include:  ¨ Chest pain or pressure, or a strange feeling in the chest.  ¨ Sweating. ¨ Shortness of breath. ¨ Nausea or vomiting. ¨ Pain, pressure, or a strange feeling in the back, neck, jaw, or upper belly or in one or both shoulders or arms. ¨ Lightheadedness or sudden weakness. ¨ A fast or irregular heartbeat. After you call 911, the  may tell you to chew 1 adult-strength or 2 to 4 low-dose aspirin. Wait for an ambulance.  Do not try to drive yourself.     · You have symptoms of a stroke. These may include:  ¨ Sudden numbness, tingling, weakness, or loss of movement in your face, arm, or leg, especially on only one side of your body. ¨ Sudden vision changes. ¨ Sudden trouble speaking. ¨ Sudden confusion or trouble understanding simple statements. ¨ Sudden problems with walking or balance. ¨ A sudden, severe headache that is different from past headaches.     · You passed out (lost consciousness).    Call your doctor now or seek immediate medical care if:    · You have new or increased shortness of breath.     · You feel dizzy or lightheaded, or you feel like you may faint.     · Your heart rate becomes irregular.     · You can feel your heart flutter in your chest or skip heartbeats. Tell your doctor if these symptoms are new or worse.    Watch closely for changes in your health, and be sure to contact your doctor if you have any problems. Where can you learn more? Go to http://josr-candido.info/. Enter U020 in the search box to learn more about \"Atrial Fibrillation: Care Instructions. \"  Current as of: December 6, 2017  Content Version: 11.7  © 0985-5826 [employer]. Care instructions adapted under license by [employer] (which disclaims liability or warranty for this information). If you have questions about a medical condition or this instruction, always ask your healthcare professional. [address] any warranty or liability for your use of this information. Learning About Radiofrequency Treatments for GERD  What are radiofrequency treatments for GERD? Radiofrequency (RF) treatments (like the Stretta procedure) mean that a doctor uses heat to treat the wall of the esophagus. This can help if you have irritation in your esophagus from gastroesophageal reflux disease (GERD). GERD is the backward flow of stomach acid into the esophagus, the tube that carries food and liquid to your stomach.  A one-way valve prevents stomach acid from moving up into this tube. When you have GERD, this valve does not close tightly enough. If you have mild GERD symptoms, such as heartburn, you may be able to control the problem with over-the-counter or prescription medicine. Changing your diet, losing weight, and making other lifestyle changes can also help reduce symptoms. If those treatments don't work, your doctor might recommend RF treatments. How do radiofrequency treatments help with GERD? When you have RF treatments, a doctor uses heat to treat tissue in the wall of the esophagus. As the tissue heals, you may have fewer GERD symptoms. The symptoms may even go away. Before the procedure, you will get medicines through a needle in your vein (IV) in your arm or hand. These medicines reduce pain and will make you feel relaxed and drowsy. Your throat will also be numbed. You may not remember much about the treatment. During the procedure, the doctor will use a tool called an endoscope, or scope. It's a thin, flexible, lighted viewing tool. It goes into the mouth and down the throat. Your doctor can use it to see the area that will be treated. Your doctor will find the area where the esophagus meets the stomach and will apply heat. The treatment may take about half an hour. What can you expect after radiofrequency treatments? After the treatment, your esophagus may feel tight or narrow. You may have pain in your chest. You may also feel bloated or have trouble swallowing. You will be observed for 1 to 2 hours after the treatment until the medicines wear off. You should not eat or drink until your throat is no longer numb. When you recover, you can go home. You will not be able to drive or operate machinery for 12 hours after the test. Your doctor will tell you when you can go back to your usual diet and activities. Do not drink alcohol for 12 to 24 hours after the test.  You may still need to treat some symptoms of GERD.  Your doctor will give you information about that. Follow-up care is a key part of your treatment and safety. Be sure to make and go to all appointments, and call your doctor if you are having problems. It's also a good idea to know your test results and keep a list of the medicines you take. Where can you learn more? Go to http://josr-candido.info/. Enter G283 in the search box to learn more about \"Learning About Radiofrequency Treatments for GERD. \"  Current as of: July 13, 2017  Content Version: 11.7  © 3803-1984 [employer]. Care instructions adapted under license by [employer] (which disclaims liability or warranty for this information). If you have questions about a medical condition or this instruction, always ask your healthcare professional. [address] any warranty or liability for your use of this information. Acute High Blood Pressure: Care Instructions  Your Care Instructions    Acute high blood pressure is very high blood pressure. It's a serious problem. Very high blood pressure can damage your brain, heart, eyes, and kidneys. You may have been given medicines to lower your blood pressure. You may have gotten them as pills or through a needle in one of your veins. This is called an IV. And maybe you were given other medicines too. These can be needed when high blood pressure causes other problems. To keep your blood pressure at a lower level, you may need to make healthy lifestyle changes. And you will probably need to take medicines. Be sure to follow up with your doctor about your blood pressure and what you can do about it. Follow-up care is a key part of your treatment and safety. Be sure to make and go to all appointments, and call your doctor if you are having problems. It's also a good idea to know your test results and keep a list of the medicines you take. How can you care for yourself at home?   · See your doctor as often as he or she recommends. This is to make sure your blood pressure is under control. You will probably go at least 2 times a year. But it may be more often at first.  · Take your blood pressure medicine exactly as prescribed. You may take one or more types. They include diuretics, beta-blockers, ACE inhibitors, calcium channel blockers, and angiotensin II receptor blockers. Call your doctor if you think you are having a problem with your medicine. · If you take blood pressure medicine, talk to your doctor before you take decongestants or anti-inflammatory medicine, such as ibuprofen. These can raise blood pressure. · Learn how to check your blood pressure at home. Check it often. · Ask your doctor if it's okay to drink alcohol. · Talk to your doctor about lifestyle changes that can help blood pressure. These include being active and not smoking. When should you call for help? Call 911 anytime you think you may need emergency care. This may mean having symptoms that suggest that your blood pressure is causing a serious heart or blood vessel problem. Your blood pressure may be over 180/110.   For example, call 911 if:    · You have symptoms of a heart attack. These may include:  ¨ Chest pain or pressure, or a strange feeling in the chest.  ¨ Sweating. ¨ Shortness of breath. ¨ Nausea or vomiting. ¨ Pain, pressure, or a strange feeling in the back, neck, jaw, or upper belly or in one or both shoulders or arms. ¨ Lightheadedness or sudden weakness. ¨ A fast or irregular heartbeat.     · You have symptoms of a stroke. These may include:  ¨ Sudden numbness, tingling, weakness, or loss of movement in your face, arm, or leg, especially on only one side of your body. ¨ Sudden vision changes. ¨ Sudden trouble speaking. ¨ Sudden confusion or trouble understanding simple statements. ¨ Sudden problems with walking or balance.   ¨ A sudden, severe headache that is different from past headaches.     · You have severe back or belly pain.    Do not wait until your blood pressure comes down on its own. Get help right away.   Call your doctor now or seek immediate care if:    · Your blood pressure is much higher than normal (such as 180/110 or higher), but you don't have symptoms.     · You think high blood pressure is causing symptoms, such as:  ¨ Severe headache. ¨ Blurry vision.    Watch closely for changes in your health, and be sure to contact your doctor if:    · Your blood pressure measures 140/90 or higher at least 2 times. That means the top number is 140 or higher or the bottom number is 90 or higher, or both.     · You think you may be having side effects from your blood pressure medicine.     · Your blood pressure is usually normal, but it goes above normal at least 2 times. Where can you learn more? Go to http://josr-candido.info/. Enter A879 in the search box to learn more about \"Acute High Blood Pressure: Care Instructions. \"  Current as of: May 10, 2017  Content Version: 11.7  © 2616-5484 [employer]. Care instructions adapted under license by [employer] (which disclaims liability or warranty for this information). If you have questions about a medical condition or this instruction, always ask your healthcare professional. [place] [address] any warranty or liability for your use of this information. Acute Kidney Injury: Care Instructions  Your Care Instructions    Acute kidney injury (GREY) is a sudden decrease in kidney function. This can happen over a period of hours, days or, in some cases, weeks. GREY used to be called acute renal failure, but kidney failure doesn't always happen with GREY. Common causes of GREY are dehydration, blood loss, and medicines. When GREY happens, the kidneys have trouble removing waste and excess fluids from the body. The waste and fluids build up and become harmful.   Kidney function may return to normal if the cause of GREY is treated quickly. Your chance of a full recovery depends on what caused the problem, how quickly the cause was treated, and what other medical problems you have. A machine may be used to help your kidneys remove waste and fluids for a short period of time. This is called dialysis. Follow-up care is a key part of your treatment and safety. Be sure to make and go to all appointments, and call your doctor if you are having problems. It's also a good idea to know your test results and keep a list of the medicines you take. How can you care for yourself at home? · Talk to your doctor about how much fluid you should drink. · Eat a balanced diet. Talk to your doctor or a dietitian about what type of diet may be best for you. You may need to limit sodium, potassium, and phosphorus. · If you need dialysis, follow the instructions and schedule for dialysis that your doctor gives you. · Do not smoke. Smoking can make your condition worse. If you need help quitting, talk to your doctor about stop-smoking programs and medicines. These can increase your chances of quitting for good. · Do not drink alcohol. · Review all of your medicines with your doctor. Do not take any medicines, including nonsteroidal anti-inflammatory drugs (NSAIDs) such as ibuprofen (Advil, Motrin) or naproxen (Aleve), unless your doctor says it is safe for you to do so. · Make sure that anyone treating you for any health problem knows that you have had GREY. When should you call for help? Call 911 anytime you think you may need emergency care.  For example, call if:    · You passed out (lost consciousness).    Call your doctor now or seek immediate medical care if:    · You have new or worse nausea and vomiting.     · You have much less urine than normal, or you have no urine.     · You are feeling confused or cannot think clearly.     · You have new or more blood in your urine.     · You have new swelling.     · You are dizzy or lightheaded, or feel like you may faint.    Watch closely for changes in your health, and be sure to contact your doctor if:    · You do not get better as expected. Where can you learn more? Go to http://josr-candido.info/. Enter V169 in the search box to learn more about \"Acute Kidney Injury: Care Instructions. \"  Current as of: May 12, 2017  Content Version: 11.7  © 5228-0921 [employer]. Care instructions adapted under license by [employer] (which disclaims liability or warranty for this information). If you have questions about a medical condition or this instruction, always ask your healthcare professional. [address] any warranty or liability for your use of this information. Learning About Ventricular Tachycardia  What is ventricular tachycardia? Ventricular tachycardia (say \"matthew-TRICK-yuh-ler jbjp-eu-VFL-arlene-uh\"), or VT, is a type of fast heart rhythm. It starts in the lower part of the heart (ventricles). Some forms of VT may get worse and lead to ventricular fibrillation. Both conditions can be life-threatening. What causes it? VT may be caused by a heart problem that affects the structure of the heart or the heart muscle. These problems may include a heart attack, a heart defect that you were born with, or inflammation in the heart. Sometimes VT happens after heart surgery. Other heart rhythm problems can also lead to it. Some people with normal hearts have VT. This tends to be less serious. Some medicines, such as those used to treat some types of abnormal heart rhythms, can cause VT. Other causes include electrolyte imbalances and using illegal drugs such as stimulants like cocaine. In some cases, the cause isn't known. What are the symptoms? Symptoms of VT include:  · Palpitations. This is an uncomfortable awareness of the heart beating very fast or not in a regular way.   · Feeling dizzy or lightheaded. · Shortness of breath. · Chest pain or pressure. · Near-fainting or fainting. Symptoms happen because the heart beats too fast. It can't pump enough blood to the rest of the body. How is VT diagnosed? Your doctor will do an exam and ask about your health history. Your doctor will also do an electrocardiogram (EKG, ECG). This is a tracing of the electrical activity of your heart. VT can come and go. It may be hard to capture with an EKG at your doctor's office. So the doctor may want you to wear a heart monitor. It records your heart rhythm over a few days. You may have lab tests and a chest X-ray. Your doctor may also recommend other tests. · An echocardiogram looks at the structure of your heart. · A stress test can show if the heart muscle is getting enough blood or if there are any blockages in the arteries of the heart. · An electrophysiology (EP) study can find specific areas of your heart that may be causing the VT. The results of these tests can help your doctor decide what treatment options you have. How is it treated? Goals of treatment are to:  · Prevent an abnormal heartbeat. · Improve your symptoms. · Prevent cardiac arrest (the heart stops beating) and sudden death. To prevent VT and relieve symptoms, you may take heart rhythm medicines. Some people may have a catheter ablation. This procedure destroys small areas of heart tissue that cause the irregular heartbeat. It may make VT happen less often. Or it may stop VT from happening again. Your doctor may recommend a device that can detect a life-threatening abnormal heartbeat and help restore a normal rhythm. This device might be implanted (ICD, or implantable cardioverter-defibrillator) or worn as a vest.  If you have VT that is not stopping, it is a medical emergency. You may need a shock to try to get your heart back into a normal rhythm.  This can be from an automated external defibrillator (AED), by paramedics, or through treatment in an emergency room. A doctor may give you medicines if your condition is stable. Follow-up care is a key part of your treatment and safety. Be sure to make and go to all appointments, and call your doctor if you are having problems. It's also a good idea to know your test results and keep a list of the medicines you take. Where can you learn more? Go to http://josr-candido.info/. Enter V110 in the search box to learn more about \"Learning About Ventricular Tachycardia. \"  Current as of: March 8, 2018  Content Version: 11.7  © 7973-1568 [email or web address]. Care instructions adapted under license by [employer] (which disclaims liability or warranty for this information). If you have questions about a medical condition or this instruction, always ask your healthcare professional. [address] any warranty or liability for your use of this information. Chronic Obstructive Pulmonary Disease (COPD): Care Instructions  Your Care Instructions    Chronic obstructive pulmonary disease (COPD) is a general term for a group of lung diseases, including emphysema and chronic bronchitis. People with COPD have decreased airflow in and out of the lungs, which makes it hard to breathe. The airways also can get clogged with thick mucus. Cigarette smoking is a major cause of COPD. Although there is no cure for COPD, you can slow its progress. Following your treatment plan and taking care of yourself can help you feel better and live longer. Follow-up care is a key part of your treatment and safety. Be sure to make and go to all appointments, and call your doctor if you are having problems. It's also a good idea to know your test results and keep a list of the medicines you take. How can you care for yourself at home?   Staying healthy    · Do not smoke. This is the most important step you can take to prevent more damage to your lungs.  If you need help quitting, talk to your doctor about stop-smoking programs and medicines. These can increase your chances of quitting for good.     · Avoid colds and flu. Get a pneumococcal vaccine shot. If you have had one before, ask your doctor whether you need a second dose. Get the flu vaccine every fall. If you must be around people with colds or the flu, wash your hands often.     · Avoid secondhand smoke, air pollution, and high altitudes. Also avoid cold, dry air and hot, humid air. Stay at home with your windows closed when air pollution is bad.    Medicines and oxygen therapy    · Take your medicines exactly as prescribed. Call your doctor if you think you are having a problem with your medicine.     · You may be taking medicines such as:  ¨ Bronchodilators. These help open your airways and make breathing easier. Bronchodilators are either short-acting (work for 6 to 9 hours) or long-acting (work for 24 hours). You inhale most bronchodilators, so they start to act quickly. Always carry your quick-relief inhaler with you in case you need it while you are away from home. ¨ Corticosteroids (prednisone, budesonide). These reduce airway inflammation. They come in pill or inhaled form. You must take these medicines every day for them to work well.     · A spacer may help you get more inhaled medicine to your lungs. Ask your doctor or pharmacist if a spacer is right for you. If it is, ask how to use it properly.     · Do not take any vitamins, over-the-counter medicine, or herbal products without talking to your doctor first.     · If your doctor prescribed antibiotics, take them as directed. Do not stop taking them just because you feel better. You need to take the full course of antibiotics.     · Oxygen therapy boosts the amount of oxygen in your blood and helps you breathe easier.  Use the flow rate your doctor has recommended, and do not change it without talking to your doctor first.   Activity    · Get regular exercise. Walking is an easy way to get exercise. Start out slowly, and walk a little more each day.     · Pay attention to your breathing. You are exercising too hard if you cannot talk while you are exercising.     · Take short rest breaks when doing household chores and other activities.     · Learn breathing methods-such as breathing through pursed lips-to help you become less short of breath.     · If your doctor has not set you up with a pulmonary rehabilitation program, talk to him or her about whether rehab is right for you. Rehab includes exercise programs, education about your disease and how to manage it, help with diet and other changes, and emotional support. Diet    · Eat regular, healthy meals. Use bronchodilators about 1 hour before you eat to make it easier to eat. Eat several small meals instead of three large ones. Drink beverages at the end of the meal. Avoid foods that are hard to chew.     · Eat foods that contain protein so that you do not lose muscle mass.     · Talk with your doctor if you gain too much weight or if you lose weight without trying.    Mental health    · Talk to your family, friends, or a therapist about your feelings. It is normal to feel frightened, angry, hopeless, helpless, and even guilty. Talking openly about bad feelings can help you cope. If these feelings last, talk to your doctor. When should you call for help? Call 911 anytime you think you may need emergency care.  For example, call if:    · You have severe trouble breathing.    Call your doctor now or seek immediate medical care if:    · You have new or worse trouble breathing.     · You cough up blood.     · You have a fever.    Watch closely for changes in your health, and be sure to contact your doctor if:    · You cough more deeply or more often, especially if you notice more mucus or a change in the color of your mucus.     · You have new or worse swelling in your legs or belly.     · You are not getting better as expected. Where can you learn more? Go to http://josr-candido.info/. Cristina Goldsmith in the search box to learn more about \"Chronic Obstructive Pulmonary Disease (COPD): Care Instructions. \"  Current as of: December 6, 2017  Content Version: 11.7  © 7615-2374 [employer]. Care instructions adapted under license by [employer] (which disclaims liability or warranty for this information). If you have questions about a medical condition or this instruction, always ask your healthcare professional. [address] any warranty or liability for your use of this information. Gastroesophageal Reflux Disease (GERD): Care Instructions  Your Care Instructions    Gastroesophageal reflux disease (GERD) is the backward flow of stomach acid into the esophagus. The esophagus is the tube that leads from your throat to your stomach. A one-way valve prevents the stomach acid from moving up into this tube. When you have GERD, this valve does not close tightly enough. If you have mild GERD symptoms including heartburn, you may be able to control the problem with antacids or over-the-counter medicine. Changing your diet, losing weight, and making other lifestyle changes can also help reduce symptoms. Follow-up care is a key part of your treatment and safety. Be sure to make and go to all appointments, and call your doctor if you are having problems. It's also a good idea to know your test results and keep a list of the medicines you take. How can you care for yourself at home? · Take your medicines exactly as prescribed. Call your doctor if you think you are having a problem with your medicine. · Your doctor may recommend over-the-counter medicine. For mild or occasional indigestion, antacids, such as Tums, Gaviscon, Mylanta, or Maalox, may help.  Your doctor also may recommend over-the-counter acid reducers, such as Pepcid AC, Tagamet HB, Zantac 75, or Prilosec. Read and follow all instructions on the label. If you use these medicines often, talk with your doctor. · Change your eating habits. ¨ It's best to eat several small meals instead of two or three large meals. ¨ After you eat, wait 2 to 3 hours before you lie down. ¨ Chocolate, mint, and alcohol can make GERD worse. ¨ Spicy foods, foods that have a lot of acid (like tomatoes and oranges), and coffee can make GERD symptoms worse in some people. If your symptoms are worse after you eat a certain food, you may want to stop eating that food to see if your symptoms get better. · Do not smoke or chew tobacco. Smoking can make GERD worse. If you need help quitting, talk to your doctor about stop-smoking programs and medicines. These can increase your chances of quitting for good. · If you have GERD symptoms at night, raise the head of your bed 6 to 8 inches by putting the frame on blocks or placing a foam wedge under the head of your mattress. (Adding extra pillows does not work.)  · Do not wear tight clothing around your middle. · Lose weight if you need to. Losing just 5 to 10 pounds can help. When should you call for help? Call your doctor now or seek immediate medical care if:    · You have new or different belly pain.     · Your stools are black and tarlike or have streaks of blood.    Watch closely for changes in your health, and be sure to contact your doctor if:    · Your symptoms have not improved after 2 days.     · Food seems to catch in your throat or chest.   Where can you learn more? Go to http://josr-candido.info/. Enter Z551 in the search box to learn more about \"Gastroesophageal Reflux Disease (GERD): Care Instructions. \"  Current as of: May 12, 2017  Content Version: 11.7  © 6111-6000 [employer], [employer]. Care instructions adapted under license by [employer] (which disclaims liability or warranty for this information).  If you have questions about a medical condition or this instruction, always ask your healthcare professional. [address] any warranty or liability for your use of this information. Heart Rhythm Problems in Heart Failure: Care Instructions  Your Care Instructions    A heart rhythm problem, or arrhythmia, is a change in the normal rhythm of your heart. Your heart may beat too fast or too slow or beat with an irregular or skipping rhythm. A change in the heart's rhythm may feel like a really strong heartbeat or a fluttering in your chest. A severe heart rhythm problem can keep the body from getting the blood it needs. This can cause shortness of breath, lightheadedness, and fainting. A heart rhythm problem can make your heart failure worse and increase your chance of dying suddenly. You may take medicine to treat your condition. Your doctor may recommend a pacemaker, an implantable cardioverter-defibrillator (ICD), or a procedure called catheter ablation to destroy small parts of the heart that are causing a rhythm problem. Follow-up care is a key part of your treatment and safety. Be sure to make and go to all appointments, and call your doctor if you are having problems. It's also a good idea to know your test results and keep a list of the medicines you take. How can you care for yourself at home? · Take your medicines exactly as prescribed. Talk to your doctor if you have any problems with your medicines. · If you received a pacemaker or a defibrillator, you will get a fact sheet about it. · Wear a medical alert ID bracelet. You can buy one at most drugstores or order it on the Internet. · Make sure you go to your follow-up appointments. To change your lifestyle  · Do not smoke. · Eat a heart-healthy diet. · Do not drink too much alcohol. Also, get enough sleep, and do not overeat. · Ask your doctor whether you can take over-the-counter medicines (such as decongestants).  These can make your heart beat fast.  Be active  · Start light exercise if your doctor says you can. Even a small amount will help you get stronger, have more energy, and manage your stress. · Walk to get exercise easily. Start by walking a little more than you did the day before. Bit by bit, increase the amount you walk. · When you exercise, watch for signs that your heart is working too hard. You are pushing too hard if you cannot talk while you exercise. If you become short of breath or dizzy or have chest pain, sit down and rest.  · If your doctor has not set you up with a cardiac rehabilitation (rehab) program, talk to him or her about whether that is right for you. Cardiac rehab includes exercise, help with diet and lifestyle changes, and emotional support. It may reduce your risk of future heart problems. · Check your pulse daily. Place two fingers on the artery at the palm side of your wrist, in line with your thumb. If your heartbeat seems uneven, talk to your doctor. When should you call for help? Call 911 if you have symptoms of sudden heart failure, such as:    · You have severe trouble breathing.     · You cough up pink, foamy mucus.     · You have a new irregular or rapid heartbeat.    Call 911 if you have symptoms of a heart attack. These may include:    · Chest pain or pressure, or a strange feeling in the chest.     · Sweating.     · Shortness of breath.     · Nausea or vomiting.     · Pain, pressure, or a strange feeling in the back, neck, jaw, or upper belly or in one or both shoulders or arms.     · Lightheadedness or sudden weakness.     · A fast or irregular heartbeat.    After you call 911, the  may tell you to chew 1 adult-strength or 2 to 4 low-dose aspirin. Wait for an ambulance.  Do not try to drive yourself.   Call your doctor now or seek immediate medical care if:    · You have new or increased shortness of breath.     · You are dizzy or lightheaded, or you feel like you may faint.     · You have sudden weight gain, such as more than 2 to 3 pounds in a day or 5 pounds in a week. (Your doctor may suggest a different range of weight gain.)     · You have increased swelling in your legs, ankles, or feet.     · You are suddenly so tired or weak that you cannot do your usual activities.    Watch closely for changes in your health, and be sure to contact your doctor if you develop new symptoms. Where can you learn more? Go to http://josr-candido.info/. Enter J002 in the search box to learn more about \"Heart Rhythm Problems in Heart Failure: Care Instructions. \"  Current as of: December 6, 2017  Content Version: 11.7  © 7356-3196 [employer]. Care instructions adapted under license by [employer] (which disclaims liability or warranty for this information). If you have questions about a medical condition or this instruction, always ask your healthcare professional. [place] [address] any warranty or liability for your use of this information. Learning About ICDs (Implantable Cardioverter-Defibrillators)  What is an ICD (implantable cardioverter-defibrillator)? An ICD (implantable cardioverter-defibrillator) is a small, battery-powered device. It fixes serious changes in your heartbeat. ICDs are used in people who have had a life-threatening heart rhythm or are at high risk of having one. The ICD is placed under the skin of the chest. It's attached to one or two wires (called leads). Most of the time, these leads go into the heart through a vein. How does an ICD work? An ICD is always checking your heart rate and rhythm. If the ICD detects a life-threatening, rapid heart rhythm, it tries to slow the rhythm back to normal using electrical pulses. If the bad rhythm doesn't stop, the ICD sends an electric shock to the heart. This restores a normal rhythm. The device then goes back to its watchful mode.   Some ICDs also can fix a heart rate that is too slow. The ICD does this without using a shock. It can send out electrical pulses to speed up a heart rate that is too slow. Your doctor will check the ICD regularly to make sure it is working right and isn't causing any problems. Your doctor will also check the battery to see if it needs to be replaced. How is an ICD placed? Your doctor will put the ICD under the skin in your chest during minor surgery. You will likely have medicine to make you feel relaxed and sleepy during the surgery. Your doctor makes a small cut (incision) in your upper chest. He or she puts one or two leads (wires) through the cut. Most of the time, the leads go into a large blood vessel in the upper chest. Then your doctor guides the leads through the blood vessel into your heart. Your doctor connects the leads to the ICD and places it in your chest. Then the incision is closed. Your doctor also programs the ICD. Most people spend the night in the hospital, just to make sure that the device is working and that there are no problems from the surgery. How does it feel to get a shock? The shock from an ICD hurts briefly. People feel it in different ways. It's been described as feeling like a punch in the chest. But the shock is a sign that the ICD is doing its job. It's there to save your life. You won't feel any pain if the ICD uses electrical pulses to fix a heart rate that is too fast or too slow. There's no way to know how often a shock might occur. It might never happen. Not knowing when or if a shock might happen may make you nervous. Knowing what to do if you get shocked can help. Ask your doctor for an action plan. This plan will guide you step-by-step if a shock happens. What else should you know? You can live a normal life with your ICD. Here are a few tips for living well with your ICD. · Avoid strong magnetic and electrical fields. These can keep your device from working right.  Most office equipment and home appliances are safe to use. Check with your doctor about which things you should use with caution and which you should stay away from. · Be sure that any doctor, dentist, or other health professional you see knows that you have an ICD. · Always carry a card that tells what kind of device you have. Wear medical alert jewelry that says you have an ICD. You can buy this at most drugstores. · If you do get a shock, follow your action plan for what to do. · You can lead an active life with an ICD. Ask your doctor what sort of activity and intensity is safe for you. As you plan for your future and your end of life, be sure to include plans for your ICD. You can make the decision to turn off your ICD as part of the medical treatment you want at the end of life. Follow-up care is a key part of your treatment and safety. Be sure to make and go to all appointments, and call your doctor if you are having problems. It's also a good idea to know your test results and keep a list of the medicines you take. Where can you learn more? Go to http://josr-candido.info/. Enter F894 in the search box to learn more about \"Learning About ICDs (Implantable Cardioverter-Defibrillators). \"  Current as of: December 6, 2017  Content Version: 11.7  © 7818-3940 [employer]. Care instructions adapted under license by [employer] (which disclaims liability or warranty for this information). If you have questions about a medical condition or this instruction, always ask your healthcare professional. [place] [address] any warranty or liability for your use of this information. Low Sodium Diet (2,000 Milligram): Care Instructions  Your Care Instructions    Too much sodium causes your body to hold on to extra water. This can raise your blood pressure and force your heart and kidneys to work harder.  In very serious cases, this could cause you to be put in the hospital. It might even be life-threatening. By limiting sodium, you will feel better and lower your risk of serious problems. The most common source of sodium is salt. People get most of the salt in their diet from canned, prepared, and packaged foods. Fast food and restaurant meals also are very high in sodium. Your doctor will probably limit your sodium to less than 2,000 milligrams (mg) a day. This limit counts all the sodium in prepared and packaged foods and any salt you add to your food. Follow-up care is a key part of your treatment and safety. Be sure to make and go to all appointments, and call your doctor if you are having problems. It's also a good idea to know your test results and keep a list of the medicines you take. How can you care for yourself at home? Read food labels  · Read labels on cans and food packages. The labels tell you how much sodium is in each serving. Make sure that you look at the serving size. If you eat more than the serving size, you have eaten more sodium. · Food labels also tell you the Percent Daily Value for sodium. Choose products with low Percent Daily Values for sodium. · Be aware that sodium can come in forms other than salt, including monosodium glutamate (MSG), sodium citrate, and sodium bicarbonate (baking soda). MSG is often added to Asian food. When you eat out, you can sometimes ask for food without MSG or added salt. Buy low-sodium foods  · Buy foods that are labeled \"unsalted\" (no salt added), \"sodium-free\" (less than 5 mg of sodium per serving), or \"low-sodium\" (less than 140 mg of sodium per serving). Foods labeled \"reduced-sodium\" and \"light sodium\" may still have too much sodium. Be sure to read the label to see how much sodium you are getting. · Buy fresh vegetables, or frozen vegetables without added sauces. Buy low-sodium versions of canned vegetables, soups, and other canned goods. Prepare low-sodium meals  · Cut back on the amount of salt you use in cooking.  This will help you adjust to the taste. Do not add salt after cooking. One teaspoon of salt has about 2,300 mg of sodium. · Take the salt shaker off the table. · Flavor your food with garlic, lemon juice, onion, vinegar, herbs, and spices. Do not use soy sauce, lite soy sauce, steak sauce, onion salt, garlic salt, celery salt, mustard, or ketchup on your food. · Use low-sodium salad dressings, sauces, and ketchup. Or make your own salad dressings and sauces without adding salt. · Use less salt (or none) when recipes call for it. You can often use half the salt a recipe calls for without losing flavor. Other foods such as rice, pasta, and grains do not need added salt. · Rinse canned vegetables, and cook them in fresh water. This removes some-but not all-of the salt. · Avoid water that is naturally high in sodium or that has been treated with water softeners, which add sodium. Call your local water company to find out the sodium content of your water supply. If you buy bottled water, read the label and choose a sodium-free brand. Avoid high-sodium foods  · Avoid eating:  ¨ Smoked, cured, salted, and canned meat, fish, and poultry. ¨ Ham, monzon, hot dogs, and luncheon meats. ¨ Regular, hard, and processed cheese and regular peanut butter. ¨ Crackers with salted tops, and other salted snack foods such as pretzels, chips, and salted popcorn. ¨ Frozen prepared meals, unless labeled low-sodium. ¨ Canned and dried soups, broths, and bouillon, unless labeled sodium-free or low-sodium. ¨ Canned vegetables, unless labeled sodium-free or low-sodium. ¨ Western Cheryl fries, pizza, tacos, and other fast foods. ¨ Pickles, olives, ketchup, and other condiments, especially soy sauce, unless labeled sodium-free or low-sodium. Where can you learn more? Go to http://josr-candido.info/. Enter W298 in the search box to learn more about \"Low Sodium Diet (2,000 Milligram): Care Instructions. \"  Current as of:  May 12, 2017  Content Version: 11.7  © 5245-8536 [employer], [employer]. Care instructions adapted under license by [facility] (which disclaims liability or warranty for this information). If you have questions about a medical condition or this instruction, always ask your healthcare professional. [address] any warranty or liability for your use of this information. Semmx Activation    Thank you for requesting access to [employer]mx. Please follow the instructions below to securely access and download your online medical record. Semmx allows you to send messages to your doctor, view your test results, renew your prescriptions, schedule appointments, and more. How Do I Sign Up? 1. In your internet browser, go to [email or web address]  2. Click on the First Time User? Click Here link in the Sign In box. You will be redirect to the New Member Sign Up page. 3. Enter your Semmx Access Code exactly as it appears below. You will not need to use this code after youve completed the sign-up process. If you do not sign up before the expiration date, you must request a new code. Semmx Access Code: [email or web address]  Expires: 10/27/2018  2:07 PM (This is the date your Semmx access code will )    4. Enter the last four digits of your Social Security Number (xxxx) and Date of Birth (mm/dd/yyyy) as indicated and click Submit. You will be taken to the next sign-up page. 5. Create a Semmx ID. This will be your Semmx login ID and cannot be changed, so think of one that is secure and easy to remember. 6. Create a Semmx password. You can change your password at any time. 7. Enter your Password Reset Question and Answer. This can be used at a later time if you forget your password. 8. Enter your e-mail address. You will receive e-mail notification when new information is available in [address] 9. Click Sign Up.  You can now view and download portions of your medical record. 10. Click the Download Summary menu link to download a portable copy of your medical information. Additional Information    If you have questions, please visit the Frequently Asked Questions section of the [employer] website at [email or web address] Remember, [employer] is NOT to be used for urgent needs. For medical emergencies, dial 911.     Patient armband removed and shredded

## 2018-07-31 NOTE — PROGRESS NOTES
Reason for Admission:   Ventricular tachyarrhythmia RRAT Score:     12 Plan for utilizing home health:      no 
                 
Likelihood of Readmission:  Green/low Transition of Care Plan:   Pt is AOx4 and ambulating in hallway. Pt states she is independent with ADLs and no DMEs. Her family will pick her up when discharged.

## 2018-07-31 NOTE — DISCHARGE SUMMARY
Discharge Summary        Patient ID:        Eloisa Deal        [id number]        70 y.o.        1945        Admission Date: 7/29/2018      Discharge date and time: 7/31/2018        Inpatient care:   Problem that led to hospitalization: Active Problems:    Hypertension ()      GERD (gastroesophageal reflux disease) ()      Left bundle branch block (8/23/2011)      Biventricular implantable cardioverter-defibrillator in situ (8/23/2011)      PAF (paroxysmal atrial fibrillation) ([facility] [address] (5/14/2013)      Ventricular tachyarrhythmia ([address] (7/29/2018)      Bronchitis (7/31/2018)      GREY (acute kidney injury) ([facility] [address] (7/31/2018)       Key findings and test results:   Renal ultrasound 7/30/18  IMPRESSION:     Unremarkable ultrasound of the kidneys. Nonvisualization of the urinary bladder. pCXR 7/29/18  FINDINGS:      The cardiomediastinal silhouette is within normal limits. . Stable ICD/pacing  leads The pulmonary vascular markings are unremarkable. Mild flat left lateral  lower lung opacity is more focal and dense than on prior. No pleural effusion or  pneumothorax. Evaluation of the osseous structures demonstrates no focal  abnormality.     IMPRESSION  IMPRESSION:     New mild left lateral lung base opacity with configuration suggesting  atelectasis. Infection is not excluded. No other change to prior. EKG 7/29/18  Normal sinus rhythm   Biventricular pacing     Echo 7/30/18  · Technically difficult study with poor endocardial visualization. Definity contrast was given to enhance imaging. · Estimated left ventricular ejection fraction is 56 - 60%. Left ventricular mild concentric hypertrophy observed. Normal left ventricular wall motion, no regional wall motion abnormality noted. Age-appropriate left ventricular diastolic function. · Mild tricuspid valve regurgitation is present. Pulmonary arterial systolic pressure is 40 mmHg. Mild pulmonary hypertension is present.   · Pacer/ICD present in the right ventricle. · Mitral annular calcification. CT Chest without contrast 7/30/18  IMPRESSION:      1. Small wedge-shaped consolidation at lateral left lingular lobe which is  slightly enlarged but favorable for atelectasis.     2. No other significant pulmonary finding.     3. Chronic cystic lesion at posterior medial left breast is slightly decreased  in size, favorable for chronic postop seroma. Consults:Cardiology and Pulmonary/Intensive care    Procedures:  None             Final diagnoses (primary and secondary): <principal problem not specified>                                            Active Problems:    Hypertension ()      GERD (gastroesophageal reflux disease) ()      Left bundle branch block (8/23/2011)      Biventricular implantable cardioverter-defibrillator in situ (8/23/2011)      PAF (paroxysmal atrial fibrillation) ([facility] [address] (5/14/2013)      Ventricular tachyarrhythmia ([address] (7/29/2018)      Bronchitis (7/31/2018)      GREY (acute kidney injury) ([facility] [address] (7/31/2018)            Brief hospital course  HPI:  Sidney Singh is a 67 y.o.  female who has  hx of CAD, COPD, DM, HTN, HLD and Medtronic pacemakerwho presents to ED after acute defibrillator firing        Pt reported that  over the last two weeks her defibrillator has fired twice (once 2 weeks ago on the 16th) and then again before admission while she was sitting on the toilet after urinating.       Pt reports associated sx of coughing with an episode of emesis as well as left sided CP 2 nights before admission  radiating to the central chest while in bed watching tv that spontaneously resolved after 10 minutes.      Pt has chronic cough has been f/u pulmonary Dr Laura Mahan has been on albuterol prn . Pt also has h/o COPD and cardiomyopathy . Pt was treated recently for URI question pneumonia . She was treated with Augmentin and Levaquin she supposed to take last dose of Levaquin .  Pt still feeling congested coughing no significant phlegm production      Pt has H/O breast cancer s/p surgery and has been f/u [facility]   for chronic Seroma.      Pt had Ct scan chest may 2016 after diagnosis of PE and was resolved at this time pt has been on Eliquis by cardiology after her PE due to PAF      Pt seen by Dr Kaylee Medina echo was done this morning .      Discussed with Dr Bernardo Finnegan he will see the patient for her chronic cough and question pneumonia Lt mid lung\"    Hosp Course:  H/O Cardiomyopathy last EJF improved to 55 %. S/p AICD dicharge twice for the last 2 weeks h/o PAF , elevated troponin I on admission possible underlying CAD. PAF. Pt seen by cardiology, noted that patient was switched to cardizem and lopressor was discontinued and should have continued on both medications. She was restarted on both loperssor and cardizem without further event. BP and HR remained in low normal values. Echo with normal EF. Troponin mildly elevated and downtrended likely from AICD discharge.       Chronic cough/ COPD/ PNA ruled out patient with Bronchitis; H/O DVT and PE   Last Ct scan 9/12/161.  No convincing CT evidence of pulmonary embolism. Interval resolution of multifocal right sided PE seen on prior CT. Pt was treated with augmentin for 7 days and Levaquin for 10 days as outpatient, last dose supposed to be 7/30/18. Repeat CXR 7/29 question pneumonia mid Lt lung, pt initiated on zosyn, discussed with pulmonology consult dr Nichelle Rogers, recommended repeat CT chest.  CT chest 7/30/18 without consolidation more consistent with atelectasis. Pulmonology recommending likely more bronchitis. Continue Azithromycin 5 day course for anti-inflammatory properties, continue incruse inhaler, add breo 100/25mcg for at least next month. Continue albuterol 3-4 times daily for next 3 days and then as needed. Continue flutter device at home.   Anticipate slow resolution of cough over next 6-8 weeks.       Acute renal insufficiency most likely due to dehydration. Renal function stable. UA on admission neg for infection, did show hyaline casts more consistent with dehydration. Patient given IVF while hospitalized with renal function improved. Renal ultrasound unremarkable. Avoid nephrotoxins. Renally dose medications as appropriate. Continue to encourage PO hydration, recheck renal function on follow up.     Diabetes type 2  HG A1c 6.7  Hold Glucophage until renal functioning improved     Depression  continue Lexapro      Hyperlipidemia  continue Lipitor      H/O Lt  breast cancer S/P  Lumpectomy surgery  And radiation chronic Seroma   Pt has been f/u surgery and oncology at [facility]   CT chest shows chronic Seroma      Seasonal allergies  Continue Claritin and  And Singulair, added flonase. Discharge Exam:   Visit Vitals    /73 (BP 1 Location: Right arm, BP Patient Position: At rest)    Pulse 60    Temp 97.1 °F (36.2 °C)    Resp 18    Ht 5' 5\" (1.651 m)    Wt 95.3 kg (210 lb)    SpO2 93%    BMI 34.95 kg/m2     General:  Alert, cooperative, no distress, appears stated age. Head:  Normocephalic, without obvious abnormality, atraumatic. Eyes:  Conjunctivae/corneas clear. PERRL, EOMs intact. Nose: Nares normal. Septum midline. Mucosa normal. No drainage or sinus tenderness. Throat: Lips, mucosa, and tongue normal.    Neck: Supple, symmetrical, trachea midline, no adenopathy, no JVD. Back:   Symmetric, no curvature. ROM normal. No CVA tenderness. Lungs:   Slight rhonchi bilaterally. No wheezes or rales. Chest wall:  No tenderness or deformity. Heart:  Regular rate and rhythm, S1, S2 normal, no murmur, click, rub or gallop. Abdomen:   Soft, non-tender. Bowel sounds normal. No masses,  No organomegaly. Extremities: Extremities normal, atraumatic, no cyanosis or edema. Pulses: 2+ and symmetric all extremities. Skin: Skin color, texture, turgor normal. No rashes or lesions.    Neurologic: CNII-XII intact. Normal strength, sensation and reflexes throughout. Postdischarge care/patient self-management          Medications at discharge  including reasons for change and indications for new ones:   Current Discharge Medication List      START taking these medications    Details   fluticasone (FLONASE) 50 mcg/actuation nasal spray 2 Sprays by Both Nostrils route daily. Qty: 1 Bottle, Refills: 0      predniSONE (DELTASONE) 20 mg tablet Take 1 Tab by mouth daily (with breakfast). Start 8/1/18, last dose 8/3/18  Qty: 3 Tab, Refills: 0      umeclidinium (INCRUSE ELLIPTA) 62.5 mcg/actuation inhaler Take 1 Puff by inhalation daily. Qty: 1 Inhaler, Refills: 3      azithromycin (ZITHROMAX) 500 mg tab Take 1 Tab by mouth daily for 5 days. Qty: 5 Tab, Refills: 0      fluticasone-vilanterol (BREO ELLIPTA) 100-25 mcg/dose inhaler Take 1 Puff by inhalation daily. Qty: 1 Inhaler, Refills: 0      albuterol sulfate (PROAIR RESPICLICK) 90 mcg/actuation aepb Take 2 Puffs by inhalation every four (4) hours as needed. Qty: 1 Inhaler, Refills: 3         CONTINUE these medications which have NOT CHANGED    Details   dilTIAZem CD (CARDIZEM CD) 240 mg ER capsule Take 240 mg by mouth daily. loratadine (CLARITIN) 10 mg tablet Take 10 mg by mouth.      montelukast (SINGULAIR) 10 mg tablet Take 10 mg by mouth daily. Indications: Allergic Rhinitis      metoprolol tartrate (LOPRESSOR) 100 mg IR tablet TAKE ONE TABLET BY MOUTH TWICE DAILY  Qty: 180 Tab, Refills: 3      atorvastatin (LIPITOR) 40 mg tablet Take 1 Tab by mouth daily. Qty: 90 Tab, Refills: 3      MULTIVIT-MIN/IRON/FOLIC/LUTEIN (CENTRUM SILVER WOMEN PO) Take  by mouth. ELIQUIS 5 mg tablet Take 1 Tab by mouth two (2) times a day. Qty: 120 Tab, Refills: 3    Comments: Resume evening of 3/16/17  Associated Diagnoses: Chronic obstructive pulmonary disease, unspecified COPD type ([facility] [address]  Mediastinal adenopathy; Other acute pulmonary embolism without acute cor pulmonale (HCC)      escitalopram oxalate (LEXAPRO) 20 mg tablet Take 20 mg by mouth daily. exemestane (AROMASIN) 25 mg tablet Take 25 mg by mouth daily. STOP taking these medications       metFORMIN ER (GLUCOPHAGE XR) 500 mg tablet Comments:   Reason for Stopping:                   Pending laboratory work and tests:  None         Condition at discharge and functional status: improved and stable        Diet at discharge Cardiac Diet and Diabetic Diet        Activities at discharge: as tolerated        Discharge destination: home      Advanced care plan    Full Code    Contact information/plan for follow up care     Follow-up with Dr. Floyd Headley or Dr. Jose Hong in 3-5 days.   Follow-up with Cardiology, Dr. Ml Flores, in 1-2 weeks  Follow-up with Pulmonology, Dr. Patti Bahena, in 2-3 weeks  Follow-up tests/labs:  Cbc, cmp, mg

## 2018-07-31 NOTE — PROGRESS NOTES
[facility] - Progress Note Admit Date: 7/29/2018 Assessment:  
 
Hospital Problems  Date Reviewed: 7/30/2018 Codes Class Noted POA Bronchitis ICD-10-CM: J40 ICD-9-CM: 992  7/31/2018 Yes GREY (acute kidney injury) ([facility] [address] ICD-10-CM: N17.9 ICD-9-CM: 584.9  7/31/2018 Yes Ventricular tachyarrhythmia ([facility] [address] ICD-10-CM: I47.2 ICD-9-CM: 427.1  7/29/2018 Yes PAF (paroxysmal atrial fibrillation) ([facility] ICD-10-CM: I48.0 ICD-9-CM: 427.31  5/14/2013 Yes Left bundle branch block ICD-10-CM: I44.7 ICD-9-CM: 426.3  8/23/2011 Yes Biventricular implantable cardioverter-defibrillator in situ ICD-10-CM: Z95.810 ICD-9-CM: V45.02  8/23/2011 Yes Hypertension ICD-10-CM: I10 
ICD-9-CM: 401.9  Unknown Yes GERD (gastroesophageal reflux disease) ICD-10-CM: K21.9 ICD-9-CM: 530.81  Unknown Yes  
   
  
 
  
-AICD discharge.  Review of device suggests A.fib/RVR.  Similar episode 2 weeks ago. Hero Rocha was switched from lopressor to Cardizem 240 at that time. -h/o paroxysmal atrial fibrillation on Eliquis as well as lopressor 100 mg BID and recently started Cardizem 240 mg daily 
-Indeterminate troponin of 0.2, unlikely ACS 
-Possible bronchitis, recently treated with antbx, h/o recurrent bronchitis in the past followed by Dr. Mynor Rodriguez 
-Clinical dehydration, elevated Cr of 1.55 at admission 
-Medtronic bivaicd. Placed 2008, changeout 2012 and last April 2017.  Initially placed for primary prevention and heart failure at that time. -h/o transient NICM with EF 25-30% in 2008, 50% by echo July 2016. Cath 2008 without critical disease.  Echo this admission with EF 56-60%. -Chronic class II diastolic heart failure, compensated, not on diuretics as outpatient 
-h/o DVT May 2016 on Eliquis 
-Diabetes 
-Remote breast cancer 
-Recent melena, s/p EGD/colonscopy 3/7/17 -Life expectancy > 1 year 
-Baseline LBBB with QRS > 120 msec 
-Chronic BB therapy 
-Unable to tolerate ARB/ACEI due to hypotension 
-Retired nurse 
   
Primary cardiologist Dr. Rylee Smith:  
 
Rhythm stable, hemodynamics stable. Continue lopressor and cardizem. Continue anticoagulation with Eliquis. [id number] Shayy Arechiga for discharge from cardiac standpoint. Subjective: SOB with activity but overall much improved. Objective:  
  
Patient Vitals for the past 8 hrs: 
 Temp Pulse Resp BP SpO2  
07/31/18 1150 97.5 °F (36.4 °C) 60 19 138/79 94 % 07/31/18 0802 97.1 °F (36.2 °C) 60 18 112/73 93 % Patient Vitals for the past 96 hrs: 
 Weight  
07/30/18 0832 95.3 kg (210 lb)  
07/29/18 1408 95.3 kg (210 lb) Intake/Output Summary (Last 24 hours) at 07/31/18 1247 Last data filed at 07/31/18 8385 Gross per 24 hour Intake              419 ml Output             1300 ml Net             -881 ml Physical Exam: 
General:  alert, cooperative, no distress, appears stated age Neck:  no JVD Lungs:  diminished breath sounds Heart:  regular rate and rhythm Abdomen:  abdomen is soft without significant tenderness, masses, organomegaly or guarding Extremities:  extremities normal, atraumatic, no cyanosis or edema Data Review:  
 
Labs: Results:  
   
Chemistry Recent Labs  
   07/31/18 
 0254  07/29/18 [place] GLU  171*  114* NA  141  140  
K  4.6  3.8 CL  106  103 CO2  27  26 BUN  23*  16  
CREA  1.53*  1.55* CA  9.4  9.5 MG  2.3  1.9 AGAP  8  11 BUCR  15  10* AP  97  105  
TP  6.8  7.4 ALB  3.6  3.8 GLOB  3.2  3.6 AGRAT  1.1  1.1  
  
CBC w/Diff Recent Labs  
   07/31/18 
 0254  07/29/18 [place] WBC  8.3  13.1 RBC  4.48  4.99  
HGB  12.4  14.0  
HCT  38.1  41.7 PLT  291  325 GRANS  76*  69  
LYMPH  17*  21 EOS  0  2 Cardiac Enzymes No results found for: CPK, CK, CKMMB, CKMB, RCK3, CKMBT, CKNDX, CKND1, BENSON, TROPT, TROIQ, MULUGETA, TROPT, TNIPOC, BNP, BNPP Coagulation No results for input(s): PTP, INR, APTT in the last 72 hours.  
 
No lab exists for component: INREXT Lipid Panel Lab Results Component Value Date/Time Cholesterol, total 187 10/05/2012 12:00 AM  
 HDL Cholesterol 49 10/05/2012 12:00 AM  
 LDL, calculated 110 (H) 10/05/2012 12:00 AM  
 VLDL, calculated 28 10/05/2012 12:00 AM  
 Triglyceride 140 10/05/2012 12:00 AM  
  
BNP Lab Results Component Value Date/Time  
 B-type Natriuretic Peptide 58.4 04/16/2012 12:00 AM  
  
Liver Enzymes Recent Labs  
   07/31/18 
 0254 TP  6.8 ALB  3.6 AP  97 SGOT  16  
  
Digoxin Thyroid Studies Lab Results Component Value Date/Time TSH 2.060 10/05/2012 12:00 AM  
    
 
Signed By: JULISSA Griffith   
 July 31, 2018

## 2018-08-01 LAB — IGE SERPL-ACNC: 214 IU/ML (ref 0–100)

## 2018-08-28 ENCOUNTER — OFFICE VISIT (OUTPATIENT)
Dept: CARDIOLOGY CLINIC | Age: 73
End: 2018-08-28

## 2018-08-28 VITALS
WEIGHT: 214 LBS | BODY MASS INDEX: 35.65 KG/M2 | DIASTOLIC BLOOD PRESSURE: 62 MMHG | HEIGHT: 65 IN | OXYGEN SATURATION: 97 % | SYSTOLIC BLOOD PRESSURE: 96 MMHG | HEART RATE: 61 BPM

## 2018-08-28 DIAGNOSIS — I48.0 PAF (PAROXYSMAL ATRIAL FIBRILLATION) (HCC): ICD-10-CM

## 2018-08-28 DIAGNOSIS — I42.9 CARDIOMYOPATHY, IDIOPATHIC (HCC): Primary | ICD-10-CM

## 2018-08-28 DIAGNOSIS — Z95.810 BIVENTRICULAR IMPLANTABLE CARDIOVERTER-DEFIBRILLATOR IN SITU: ICD-10-CM

## 2018-08-28 PROBLEM — E66.01 SEVERE OBESITY (BMI 35.0-39.9): Status: ACTIVE | Noted: 2018-08-28

## 2018-08-28 RX ORDER — ATORVASTATIN CALCIUM 20 MG/1
TABLET, FILM COATED ORAL DAILY
COMMUNITY
End: 2019-03-04 | Stop reason: ALTCHOICE

## 2018-08-28 NOTE — MR AVS SNAPSHOT
[address] [place] [address] [phone] Patient: Ariane Carpenter MRN: [id number] :1945 Visit Information Date & Time Provider Department Dept. Phone Encounter #  
 2018  2:00 PM Saúl Julian MD [id number] Your Appointments 10/11/2018  2:00 PM  
PROCEDURE with Pacer Hv Csi [facility] ([address] [place] [address]) Appt Note: w/ Dr Arthur Figueredo appt; device check [place] [address] [phone] [address] [place] [address] [address] Upcoming Health Maintenance Date Due DTaP/Tdap/Td series (1 - Tdap) 1966 BREAST CANCER SCRN MAMMOGRAM 1994 FOBT Q 1 YEAR AGE 50-75 1994 ZOSTER VACCINE AGE 60> 10/27/2004 GLAUCOMA SCREENING Q2Y 2009 Pneumococcal 65+ Low/Medium Risk (2 of 2 - PCV13) 2017 MEDICARE YEARLY EXAM 3/14/2018 Influenza Age 5 to Adult 2018 Allergies as of 2018  Review Complete On: 2018 By: Saúl Julian MD  
  
 Severity Noted Reaction Type Reactions Lisinopril High 2010    Rash Hypertensive crisis Tetracycline High 2012    Anaphylaxis, Rash, Swelling Hibiclens [Chlorhexidine Gluconate]  2017    Other (comments) Turned red and BP drop low. Other Medication  2010    Rash, Swelling  
 -Myacins Current Immunizations  Reviewed on 2017 Name Date Pneumococcal Polysaccharide (PPSV-23) 2016 Not reviewed this visit You Were Diagnosed With   
  
 Codes Comments Cardiomyopathy, idiopathic ([facility] [address]    -  Primary ICD-10-CM: I42.8 ICD-9-CM: 425.4 PAF (paroxysmal atrial fibrillation) (HCC)     ICD-10-CM: I48.0 ICD-9-CM: 427.31  Biventricular implantable cardioverter-defibrillator in situ     ICD-10-CM: Z95.810 
 ICD-9-CM: V45.02 Vitals BP Pulse Height(growth percentile) Weight(growth percentile) SpO2 BMI  
 96/62 61 5' 5\" (1.651 m) 214 lb (97.1 kg) 97% 35.61 kg/m2 OB Status Smoking Status Postmenopausal Former Smoker Vitals History BMI and BSA Data Body Mass Index Body Surface Area  
 35.61 kg/m 2 2.11 m 2 Preferred Pharmacy Pharmacy Name Phone [facility] [address] [place] [address] [phone] Your Updated Medication List  
  
   
This list is accurate as of 8/28/18  2:39 PM.  Always use your most recent med list.  
  
  
  
  
 albuterol sulfate 90 mcg/actuation Aepb Commonly known as:  Quilla Miser Take 2 Puffs by inhalation every four (4) hours as needed. CARDIZEM  mg ER capsule Generic drug:  dilTIAZem CD Take 240 mg by mouth daily. CENTRUM SILVER WOMEN PO Take  by mouth. CLARITIN 10 mg tablet Generic drug:  loratadine Take 10 mg by mouth. ELIQUIS 5 mg tablet Generic drug:  apixaban Take 1 Tab by mouth two (2) times a day. escitalopram oxalate 20 mg tablet Commonly known as:  Colan Big Take 20 mg by mouth daily. exemestane 25 mg tablet Commonly known as:  Morene Chafe Take 25 mg by mouth daily. fluticasone 50 mcg/actuation nasal spray Commonly known as:  Adriel Redo 2 Sprays by Both Nostrils route daily. LIPITOR 20 mg tablet Generic drug:  atorvastatin Take  by mouth daily. metoprolol tartrate 100 mg IR tablet Commonly known as:  LOPRESSOR  
TAKE ONE TABLET BY MOUTH TWICE DAILY  
  
 umeclidinium 62.5 mcg/actuation inhaler Commonly known as:  INCRUSE ELLIPTA Take 1 Puff by inhalation daily. We Performed the Following AMB POC EKG ROUTINE W/ 12 LEADS, INTER & REP [02265 CPT(R)] Introducing [facility]!    
 Brandie Nichols introduces [employer] patient portal. Now you can access parts of your medical record, email your doctor's office, and request medication refills online. 1. In your internet browser, go to [email or web address] 2. Click on the First Time User? Click Here link in the Sign In box. You will see the New Member Sign Up page. 3. Enter your [employer] Access Code exactly as it appears below. You will not need to use this code after youve completed the sign-up process. If you do not sign up before the expiration date, you must request a new code. · [employer] Access Code: [email or web address] Expires: 10/27/2018  2:07 PM 
 
4. Enter the last four digits of your Social Security Number (xxxx) and Date of Birth (mm/dd/yyyy) as indicated and click Submit. You will be taken to the next sign-up page. 5. Create a [employer] ID. This will be your [employer] login ID and cannot be changed, so think of one that is secure and easy to remember. 6. Create a [employer] password. You can change your password at any time. 7. Enter your Password Reset Question and Answer. This can be used at a later time if you forget your password. 8. Enter your e-mail address. You will receive e-mail notification when new information is available in [address] 9. Click Sign Up. You can now view and download portions of your medical record. 10. Click the Download Summary menu link to download a portable copy of your medical information. If you have questions, please visit the Frequently Asked Questions section of the [employer] website. Remember, [employer] is NOT to be used for urgent needs. For medical emergencies, dial 911. Now available from your iPhone and Android! Please provide this summary of care documentation to your next provider. Your primary care clinician is listed as Maty Matthews. If you have any questions after today's visit, please call [phone].

## 2018-08-28 NOTE — PROGRESS NOTES
1. Have you been to the ER, urgent care clinic since your last visit? Hospitalized since your last visit? No    2. Have you seen or consulted any other health care providers outside of the [address] [place] [address] since your last visit? Include any pap smears or colon screening.  No

## 2018-08-28 NOTE — PROGRESS NOTES
HISTORY OF PRESENT ILLNESS  Rowena Aj is a 67 y.o. female. HPI  She presents to the office for a post hospital visit. She is now almost back to baseline. She is still coughing occasionally. She denies chest pain, dyspnea, orthopnea, PND. She denies palpitation, dizziness or syncope. She denies any symptoms of TIA or amaurosis fugax. She started experiencing coughing and respiratory difficulties. She was treated with inhalers and antibiotics without help. She subsequently developed pneumonia and has respiratory difficulties and was hospitalized. She developed rapid atrial fibrillation and subsequently shocked by her ICD because of the fast heart rate. In the hospital, there was no evidence of decompensated congestive heart failure. She had an echocardiogram on 7/30/18 which demonstrated normal left ventricular systolic function with EF in the 55-60% range. PA pressure was estimated at 40 mmHg. There was no significant valvular pathology. The left atrial dimension was normal with volume index of 31 ml/m sq. The interrogation of the ICD demonstrated once again the episode of left atrial fibrillation and ICD shock on 7/29/18. Her OptiVol is normal indicating normal volume status at this time. She has history of a left bundle branch block, hypertension, dyslipidemia and glucose intolerance. She has a 20 pack a year cigarette smoking history until 1988. She was told that she had a heart murmur and hole in her heart as a child, but this has not been clearly documented by echocardiogram or other diagnostic workup. She was seen by Dr. Ayaka Cedeno in 2003, for the evaluation of left bundle branch block and had negative stress nuclear cardiac imaging. She was told that everything was okay by Dr. Ayaka Cedeno at that time.  She had repeat stress nuclear cardiac imaging on September 17, 2007, which demonstrated a moderate, fixed perfusion defect in the basal inferior and mid inferior wall, involving the inferior apex as well, with no significant ischemia. There was also moderately severe scarring in the anterior wall, anterior apex and anterior septum, with very mild associated ischemia. There was an akinetic interventricular septum and anterior apex, and mild hypokinesis of the proximal anterior wall, with moderate hypokinesis of the entire inferior wall, with overall EF in the 25% range. She subsequently underwent cardiac catheterization on February 27, 2008 which demonstrated patent coronary arteries, but severe LV dysfunction with EF in the 25-30% range. She then went on to have biventricular ICD implantation on May 12, 2008. Her EF has improved to 56% by repeat echocardiogram in 2009. She had a repeat echocardiogram on August 13, 2010, which demonstrated once again improved LV function, with EF in the 55% range. Left atrial volume was normal at 23 ml/m². PA pressure was estimated normal as well.    Because of shortness of breath she had repeat echocardiogram on 4/16/2012 with demonstrated normal LV function with the EF in the 60 to 65% range. There was grade 1 diastolic dysfunction. PA pressure was estimated in the range of 25 to 30 mmHg. There was no significant valvular pathology.    She had an ultrasound examination of the aorta, which demonstrated possible aneurysm; however, the CT scan demonstrated no evidence of aneurysm whatsoever. She does not feel the palpitations much herself; however, her ICD interrogation demonstrated frequent episodes of atrial fibrillation, at times, very prolonged for hours. She had problems with worsening shortness of breath and she developed a cough and fever, and was seen in the emergency room on 05/30/16. She was ultimately diagnosed of DVT and pulmonary emboli by CT angiogram and has been placed on Eliquis.   Her echocardiogram on 07/07/2016 demonstrated normal left ventricular size and lower limit of normal left ventricular systolic function with EF in the 50% range.  There was no significant valvular pathology.  Left atrial dimension was normal with a volume index of 25 mL/meter2.  There was no significant pulmonary hypertension.       Review of Systems   Constitutional: Negative for malaise/fatigue and weight loss. HENT: Negative for hearing loss. Eyes: Negative for blurred vision and double vision. Respiratory: Positive for shortness of breath. Cardiovascular: Positive for palpitations. Negative for chest pain, orthopnea, claudication, leg swelling and PND. Gastrointestinal: Negative for blood in stool, heartburn and melena. Genitourinary: Negative for dysuria, frequency, hematuria and urgency. Musculoskeletal: Negative for back pain and joint pain. Skin: Negative for itching and rash. Neurological: Negative for dizziness, loss of consciousness and weakness. Psychiatric/Behavioral: Negative for depression and memory loss. Physical Exam   Constitutional: She is oriented to person, place, and time. She appears well-developed and well-nourished. HENT:   Head: Normocephalic and atraumatic. Eyes: Conjunctivae are normal. Pupils are equal, round, and reactive to light. Neck: Normal range of motion. Neck supple. No JVD present. Cardiovascular: Normal rate, regular rhythm, S1 normal and S2 normal.   No extrasystoles are present. PMI is not displaced. Exam reveals no gallop and no friction rub. Murmur heard. Harsh early systolic murmur is present with a grade of 1/6  at the upper right sternal border  Pulses:       Carotid pulses are 3+ on the right side, and 3+ on the left side. Pulmonary/Chest: Effort normal. She has no rales. Abdominal: Soft. There is no tenderness. Musculoskeletal: She exhibits no edema. Neurological: She is alert and oriented to person, place, and time. No cranial nerve deficit. Skin: Skin is warm and dry. Psychiatric: She has a normal mood and affect.  Her behavior is normal.     Visit Vitals    BP 96/62    Pulse 61    Ht 5' 5\" (1.651 m)    Wt 97.1 kg (214 lb)    SpO2 97%    BMI 35.61 kg/m2       Past Medical History:   Diagnosis Date    CAD (coronary artery disease)     cardiomyopathy,CHF,,Cardiac Cath, pacemaker/defib.  Cancer [facility]) 2013    breast    Cardiac catheterization 02/27/2008    Patent coronary arteries. LVEDP 13 mmHg. EF 25-30%. Global hypk.  Cardiac echocardiogram 07/07/2016    EF 50% (prev 60-65% on study of 4/16/12). No WMA. Gr 1 DDfx. Normal RVSP.  Cardiac nuclear imaging test 05/31/2013    No evidence of ongoing ischemia or prior infarction. EF 60%. No RWMA. Mild dyssynchrony of inferior base & mid/distal septum likely c/w pacemaker. Nondiagnostic EKG on pharm stress test due to V-pacing.  Cardiovascular abdominal aorta duplex 09/23/2015    Fusiform AAA in prox & mid portions of abdom Ao.  Chronic lung disease     COPD (chronic obstructive pulmonary disease) (HCC) chronic bronchitis    Coronary artery disease Feb. 2008    Possible CAD with nonischemic dilated cardiomyopathy/EF 25%/ improved EF up to 56% by echocardiogram.    Coronary artery disease     Diabetes mellitus ([address]     GERD (gastroesophageal reflux disease)     Heart failure (HCC)     Heart murmur     Hemangioma of liver     Hx of cardiomyopathy ([address]     Hypercholesterolemia     Hypertension     Left bundle branch block     Phlebitis     PVD (peripheral vascular disease) ([address]     Renal calculi 1999    Thromboembolus ([address]     below knee in R leg       Social History     Social History    Marital status:      Spouse name: N/A    Number of children: N/A    Years of education: N/A     Occupational History    Not on file.      Social History Main Topics    Smoking status: Former Smoker     Packs/day: 1.50     Years: 20.00     Types: Cigarettes     Quit date: 2/23/1988    Smokeless tobacco: Never Used    Alcohol use No    Drug use: No    Sexual activity: Not on file     Other Topics Concern    Not on file     Social History Narrative       Family History   Problem Relation Age of Onset    Cancer Mother      Cancer of the breast    Stroke Mother     Cancer Father     Heart Disease Father      CHF    COPD Brother     Heart Attack Brother      Multiple    Heart Disease Brother      CHF       Past Surgical History:   Procedure Laterality Date    COLONOSCOPY N/A 3/7/2017    COLONOSCOPY, SURVEILLANCE with polypectomy performed by Savanna Locke MD at [facility] HX BREAST LUMPECTOMY Left 8/2013    cancerous lesion    HX CATARACT REMOVAL Bilateral 1980s    w/ lens implants    HX CATARACT REMOVAL Left     re vised due to implant repositioning    HX HEART CATHETERIZATION  02/27/08    Patent coronary arteries, but severe LV dysfunction w/ EF in the 25-30% range.  HX KNEE ARTHROSCOPY Right     HX MASTECTOMY Left 9/24/2014    LEFT PARTIAL MASTECTOMY WITH NEEDLE LOCALIZATION MAMMOGRAM TIMES TWO performed by Denver Chowdary MD at [facility] HX PACEMAKER  May 2008    Biventricular ICD implantation     HX PACEMAKER  2012    replacement - Medtronic    HX PACEMAKER PLACEMENT      HX PELVIC LAPAROSCOPY      diagnostic - varicosities       Current Outpatient Prescriptions   Medication Sig Dispense Refill    atorvastatin (LIPITOR) 20 mg tablet Take  by mouth daily.  fluticasone (FLONASE) 50 mcg/actuation nasal spray 2 Sprays by Both Nostrils route daily. 1 Bottle 0    umeclidinium (INCRUSE ELLIPTA) 62.5 mcg/actuation inhaler Take 1 Puff by inhalation daily. 1 Inhaler 3    albuterol sulfate (PROAIR RESPICLICK) 90 mcg/actuation aepb Take 2 Puffs by inhalation every four (4) hours as needed. 1 Inhaler 3    dilTIAZem CD (CARDIZEM CD) 240 mg ER capsule Take 240 mg by mouth daily.  loratadine (CLARITIN) 10 mg tablet Take 10 mg by mouth.       metoprolol tartrate (LOPRESSOR) 100 mg IR tablet TAKE ONE TABLET BY MOUTH TWICE DAILY 180 Tab 3    MULTIVIT-MIN/IRON/FOLIC/LUTEIN (CENTRUM SILVER WOMEN PO) Take  by mouth.  ELIQUIS 5 mg tablet Take 1 Tab by mouth two (2) times a day. 120 Tab 3    escitalopram oxalate (LEXAPRO) 20 mg tablet Take 20 mg by mouth daily.  exemestane (AROMASIN) 25 mg tablet Take 25 mg by mouth daily. EK:1 Biv pacing,HR slower than 18  . ASSESSMENT and PLAN  Encounter Diagnoses   Name Primary?  Cardiomyopathy, idiopathic, EF now up to 55% Yes    PAF (paroxysmal atrial fibrillation) (HCC)     Biventricular implantable cardioverter-defibrillator in situ    She apparently had a primary pulmonary event with pneumonia which was complicated with the rapid atrial fibrillation and ICD shock for the rapid heart rate. There was no evidence of decompensated congestive heart failure and her LV function remains adequate with EF in the 55-60% range. With the episode, she did have moderate pulmonary hypertension. She is quite stable at the present time with no signs of cardiac decompensation with a normal OptiVol. For now, continue current medical regimen.

## 2018-09-12 ENCOUNTER — OFFICE VISIT (OUTPATIENT)
Dept: PULMONOLOGY | Age: 73
End: 2018-09-12

## 2018-09-12 VITALS
SYSTOLIC BLOOD PRESSURE: 120 MMHG | DIASTOLIC BLOOD PRESSURE: 70 MMHG | HEIGHT: 65 IN | RESPIRATION RATE: 18 BRPM | BODY MASS INDEX: 35.49 KG/M2 | HEART RATE: 60 BPM | TEMPERATURE: 97.4 F | OXYGEN SATURATION: 97 % | WEIGHT: 213 LBS

## 2018-09-12 DIAGNOSIS — J44.9 ASTHMA-COPD OVERLAP SYNDROME (HCC): ICD-10-CM

## 2018-09-12 DIAGNOSIS — I26.99 OTHER PULMONARY EMBOLISM WITHOUT ACUTE COR PULMONALE, UNSPECIFIED CHRONICITY (HCC): ICD-10-CM

## 2018-09-12 DIAGNOSIS — J47.9 BRONCHIECTASIS WITHOUT COMPLICATION (HCC): Primary | ICD-10-CM

## 2018-09-12 DIAGNOSIS — I48.0 PAF (PAROXYSMAL ATRIAL FIBRILLATION) (HCC): ICD-10-CM

## 2018-09-12 RX ORDER — AA/PROT/LYSINE/METHIO/VIT C/B6 50-12.5 MG
TABLET ORAL
COMMUNITY
End: 2022-03-17

## 2018-09-12 RX ORDER — METFORMIN HYDROCHLORIDE 500 MG/1
500 TABLET, FILM COATED, EXTENDED RELEASE ORAL DAILY
COMMUNITY
End: 2022-03-17

## 2018-09-12 NOTE — PROGRESS NOTES
Ms. Andrew Thomas has a reminder for a \"due or due soon\" health maintenance. I have asked that she contact her primary care provider for follow-up on this health maintenance. Chief Complaint Patient presents with  Cough Chief Complaint Patient presents with  Cough 1. Have you been to the ER, urgent care clinic since your last visit? Hospitalized since your last visit? Yes When: 7/29/18 Where: [facility] Reason for visit: Pneumonia 2. Have you seen or consulted any other health care providers outside of the [address] [place] [address] since your last visit? Include any pap smears or colon screening.  No

## 2018-09-12 NOTE — LETTER
9/12/2018 12:29 PM 
 
Patient:  Maria C Johnson YOB: 1945 Date of Visit: 9/12/2018 Dear Lesley Vital MD 
[address] [place] [id number] VIA Facsimile: [phone] 
 : Thank you for referring Ms. Yusuf Vora to me for evaluation/treatment. Below are the relevant portions of my assessment and plan of care. Progress Note:Follow up Visit 
 
09/12/18 Patient feels better. Got sick in July- started with bronchitis and progressed to AICD firing and eventual hospitalization. She was treated and discharged and has gradually improved. Upset about several interactions with staff and discussed at length her concerns and addressed as best as possible. Uses Albuterol PRN prior to season changes/weather changes and activity. C/o sinus congestion- planning to see ENT and allergist 
Denies increase in cough or productive expectoration at present. Denies chest pain. C/o RAMOS which limits her ADL's- usually when she is rushing Cannot walk distance/ climb stairs. Not able to exercise due to SOB. 
 
  
HPI: Ms. Wandy Langley who carries history of COPD, recurrent bronchitis requiring visit to Urgent care, Diagnosis of PE on NOAC , Cardiomyopathy S/P pacemaker and defibrillator, and history of left breast cancer is evaluated for COPD/Chronic bronchitis work up. Last CT performed in MAY 2016 demonstrated PE, nonspecific mediastinal adenopathy, minimal atelectasis/small effusion, and hyperinflation. We have obtained follow up CT chest performed on 09/12/16 did not reveal any finding concerning PE or Mediastinal adenopathy. . During this interval time patient has not noticed any symptoms concerning COPD exacerbation. Her  RAMOS remains stable. 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 Subsequent follow ups- never has had TB. Allergies Allergen Reactions  Lisinopril Rash Hypertensive crisis  Tetracycline Anaphylaxis, Rash and Swelling  Hibiclens [Chlorhexidine Gluconate] Other (comments) Turned red and BP drop low.  Other Medication Rash and Swelling  
  -Myacins Current Outpatient Prescriptions Medication Sig Dispense Refill  metFORMIN (GLUMETZA ER) 500 mg TG24 24 hour tablet Take  by mouth.  coenzyme q10 (CO Q-10) 10 mg cap Take  by mouth.  atorvastatin (LIPITOR) 20 mg tablet Take  by mouth daily.  fluticasone (FLONASE) 50 mcg/actuation nasal spray 2 Sprays by Both Nostrils route daily. 1 Bottle 0  
 umeclidinium (INCRUSE ELLIPTA) 62.5 mcg/actuation inhaler Take 1 Puff by inhalation daily. 1 Inhaler 3  
 albuterol sulfate (PROAIR RESPICLICK) 90 mcg/actuation aepb Take 2 Puffs by inhalation every four (4) hours as needed. 1 Inhaler 3  
 dilTIAZem CD (CARDIZEM CD) 240 mg ER capsule Take 240 mg by mouth daily.  loratadine (CLARITIN) 10 mg tablet Take 10 mg by mouth.  metoprolol tartrate (LOPRESSOR) 100 mg IR tablet TAKE ONE TABLET BY MOUTH TWICE DAILY 180 Tab 3  MULTIVIT-MIN/IRON/FOLIC/LUTEIN (CENTRUM SILVER WOMEN PO) Take  by mouth.  ELIQUIS 5 mg tablet Take 1 Tab by mouth two (2) times a day. 120 Tab 3  
 escitalopram oxalate (LEXAPRO) 20 mg tablet Take 20 mg by mouth daily.  exemestane (AROMASIN) 25 mg tablet Take 25 mg by mouth daily. Review of Systems Constitutional: Negative. HENT: Negative. Eyes: Negative. Respiratory: cough, SOB Cardiovascular: Negative. Gastrointestinal: Negative. Genitourinary: Negative. Musculoskeletal: Negative. Skin: Negative. Neurological: Negative. Endo/Heme/Allergies: Negative. Psychiatric/Behavioral: depression Blood pressure 120/70, pulse 60, temperature 97.4 °F (36.3 °C), temperature source Oral, resp. rate 18, height 5' 5\" (1.651 m), weight 96.6 kg (213 lb), SpO2 97 %.  
 
Physical Exam  
Constitutional: She is oriented to person, place, and time and well-developed, well-nourished, and in no distress. HENT:  
Head: Normocephalic and atraumatic. Eyes: EOM are normal. Pupils are equal, round, and reactive to light. Neck: Normal range of motion. Neck supple. No thyromegaly present. Cardiovascular: Normal rate and regular rhythm. Pulmonary/Chest: Effort normal. She has no wheezes. She has no rales. Abdominal: Soft. Bowel sounds are normal.  
Musculoskeletal: Normal range of motion. She exhibits no edema. Neurological: She is alert and oriented to person, place, and time. Skin: Skin is warm and dry. Psychiatric: Affect and judgment normal.  
 
 
Investigation: 
 
 
Results from Hospital Encounter encounter on 07/29/18 CT CHEST WO CONT Narrative CT chest without contrast  
 
HISTORY: Persistent cough COMPARISON: CT 9/12/16 TECHNIQUE: Helical scans through the chest is obtained from the thoracic inlet 
to the diaphragm without IV contrast administration. All CT scans at this facility performed using dose optimization techniques as 
appreciated to a performed exam, to include automated exposure control, 
adjustment of the mA and or KU according to patient size (including appropriate 
matching for site specific examination), or use of iterative reconstruction 
technique. FINDINGS: The study is suboptimal due to lack of IV contrast.  Subtle 
abnormality can be under detected. Lung Parenchyma: There is a small wedge-shaped pleural-based consolidation at 
lateral left lingular lobe. This shows interval enlargement as more likely 
enlarged atelectasis by appearance. The lungs are otherwise clear. No acute 
infiltration or focal pulmonary finding. Thyroid: Unremarkable. Mediastinum: No adenopathy. Pleural Space And Chest Wall: No significant pleural pathology.   There is 
chronic cystic lesion identified in the medial aspect of left breast abutting 
the pectoralis muscle with slightly decreased in size compared to the prior CT 
 which was 2.7 x 4.0 cm and now 3.1 x 3.7 cm. Small surgical staples again noted 
along the posterior wall of the lesion. The superior left chest wall pacemaker 
again noted. Heart: The heart and the pericardium appear normal. 
 
Vasculature: The aorta and the great vessels are unremarkable. Imaged Upper Abdomen: Unremarkable. Osseous Structures: Unremarkable for age. Impression IMPRESSION:  
 
1. Small wedge-shaped consolidation at lateral left lingular lobe which is 
slightly enlarged but favorable for atelectasis. 2. No other significant pulmonary finding. 3. Chronic cystic lesion at posterior medial left breast is slightly decreased 
in size, favorable for chronic postop seroma. Thank you for your referral.     
 
 
CT chest (05/10/16): There are filling defects in the upper, middle, and lower lobe branches of the right pulmonary artery. No evidence of embolus in the left pulmonary artery branches or in the central trunks. The main pulmonary artery measures 3.3 cm in diameter. The thoracic aorta is not aneurysmal.  No evidence for dissection. 
   
Lymph nodes: Mediastinal lymph nodes are mildly prominent. A right paratracheal lymph node measures 1.2 x 0.9 cm. Previously, this lymph node measured 1.8 x 0.4 cm. There is no evidence of hilar lymphadenopathy. 
   
Thyroid: Normal in size without mass in the visualized parenchyma. . 
   
Mediastinum: There is a pacemaker in the left chest wall. A fluid collection in the inferior left breast measures about 3.3 x 4.8 cm (previously, 2.8 x 4.4 cm). There is no evidence of peripheral enhancement. 2 adjacent biopsy clips are redemonstrated. There is a small hiatal hernia. 
   
Lungs:   
The lungs are diffusely hyperinflated with mild tracheobronchomalacia. Right Lung: There is a small posterior effusion with overlying atelectasis. No confluent infiltrate to suggest infarct. 
   
Left Lung:  No evidence of infiltrate.  There is a small focus of atelectasis in the posterior lingula. This is similar to previous exam. No pleural effusion. 
   
Abdomen: There is diffuse fatty atrophy of the pancreas. Visualized portions of the liver, spleen, pancreas, adrenal glands, and kidneys are unremarkable. The gallbladder is normal. 
   
Bones: A fracture deformity in the left anterior fifth rib is unchanged. Degenerative changes of the spine are stable. 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 There are no destructive lesions. 
   
IMPRESSION: 
   
1.  Multiple right pulmonary emboli. No evidence of left pulmonary emboli or pulmonary infarcts. Ethelene Gauss 2.  Small right pleural effusion. 3. Prominence of the main portal artery is suggestive of pulmonary artery hypertension. 4. COPD and mild tracheobronchomalacia. 5. Similar size of seroma in the left chest wall. 
  .      
  
PFT(2008): mild obstructive changes DATE 4/19/2017  Pre bronchodilator ( 2008) Post Bronchodilator (2008) FVC 2. 11( 69)  2.12(67) 2.13(67) FEV1 1.60( 69)  1.69(73) 1.64(71) FEV1/FVC 76  79(73) 76 TLC 4.04(78)  4.32(84)    
RV 2.14(79)  2.12(107)    
RV/TLC   49(38)    
DLCO 13.76( 61)  12.47(66)    
  
PFT 4/19/17: 
Mild to moderate restrictive impairment with reduced DLCO. Assessment: 
 
Bronchiectasis: Recurrent bronchitis/COPD exacerbation with Mild-Moderate Restrictive impairment ? sequelae of prior inflammatory process. Recurrant bronchitis- lower respiratory tract infections treated with antibiotics. Elevated IgE raises ? Allergic triggers and possible role for additional therapy History of COPD - exertional hypoxemia in past has now improved. Nonspecific Mediastinal adenopathy- S/P follow up CT chest demonstrated interval resolution  9/2016 CT. PE on NOAC ,5/206. Resolved on follow up CT in 9/2016 Atrial fibrillation- on anticoagulation, cardizem for rate control H/o breast biopsy- 2010. H/o radiation Depression 
  
  
Plan: Will check sputum eosinophils and sputum culture. Will consider addition therapy accordingly Discussed elevated IgE level Action plan for acute exacerbations discussed Continue Incruse daily- prescription refilled Continue Albuterol two puffs, every six hours as needed Continue ELIQUIS as prescribed . Cardiology following for LAVERN Harkins AICD Encouraged active lifestyle and endurance activities. Preventive vaccinations-  
RTC in 3  months time and sooner if needed. 
  
 
[facility]  
  
 
 
 
 
If you have questions, please do not hesitate to call me. I look forward to following Ms. Drew Millan along with you.  
 
 
 
Sincerely, 
 
 
John Joy MD

## 2018-09-12 NOTE — COMMUNICATION BODY
Progress Note:Follow up Visit 
 
09/12/18 Patient feels better. Got sick in July- started with bronchitis and progressed to AICD firing and eventual hospitalization. She was treated and discharged and has gradually improved. Upset about several interactions with staff and discussed at length her concerns and addressed as best as possible. Uses Albuterol PRN prior to season changes/weather changes and activity. C/o sinus congestion- planning to see ENT and allergist 
Denies increase in cough or productive expectoration at present. Denies chest pain. C/o RAMOS which limits her ADL's- usually when she is rushing Cannot walk distance/ climb stairs. Not able to exercise due to SOB. 
 
  
HPI: Ms. Wandy Langley who carries history of COPD, recurrent bronchitis requiring visit to Urgent care, Diagnosis of PE on NOAC , Cardiomyopathy S/P pacemaker and defibrillator, and history of left breast cancer is evaluated for COPD/Chronic bronchitis work up. Last CT performed in MAY 2016 demonstrated PE, nonspecific mediastinal adenopathy, minimal atelectasis/small effusion, and hyperinflation. We have obtained follow up CT chest performed on 09/12/16 did not reveal any finding concerning PE or Mediastinal adenopathy. . During this interval time patient has not noticed any symptoms concerning COPD exacerbation. Her  RAMOS remains stable. 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 Subsequent follow ups- never has had TB. Allergies Allergen Reactions  Lisinopril Rash Hypertensive crisis  Tetracycline Anaphylaxis, Rash and Swelling  Hibiclens [Chlorhexidine Gluconate] Other (comments) Turned red and BP drop low.  Other Medication Rash and Swelling  
  -Myacins Current Outpatient Prescriptions Medication Sig Dispense Refill  metFORMIN (GLUMETZA ER) 500 mg TG24 24 hour tablet Take  by mouth.  coenzyme q10 (CO Q-10) 10 mg cap Take  by mouth.  atorvastatin (LIPITOR) 20 mg tablet Take  by mouth daily.  fluticasone (FLONASE) 50 mcg/actuation nasal spray 2 Sprays by Both Nostrils route daily. 1 Bottle 0  
 umeclidinium (INCRUSE ELLIPTA) 62.5 mcg/actuation inhaler Take 1 Puff by inhalation daily. 1 Inhaler 3  
 albuterol sulfate (PROAIR RESPICLICK) 90 mcg/actuation aepb Take 2 Puffs by inhalation every four (4) hours as needed. 1 Inhaler 3  
 dilTIAZem CD (CARDIZEM CD) 240 mg ER capsule Take 240 mg by mouth daily.  loratadine (CLARITIN) 10 mg tablet Take 10 mg by mouth.  metoprolol tartrate (LOPRESSOR) 100 mg IR tablet TAKE ONE TABLET BY MOUTH TWICE DAILY 180 Tab 3  MULTIVIT-MIN/IRON/FOLIC/LUTEIN (CENTRUM SILVER WOMEN PO) Take  by mouth.  ELIQUIS 5 mg tablet Take 1 Tab by mouth two (2) times a day. 120 Tab 3  
 escitalopram oxalate (LEXAPRO) 20 mg tablet Take 20 mg by mouth daily.  exemestane (AROMASIN) 25 mg tablet Take 25 mg by mouth daily. Review of Systems Constitutional: Negative. HENT: Negative. Eyes: Negative. Respiratory: cough, SOB Cardiovascular: Negative. Gastrointestinal: Negative. Genitourinary: Negative. Musculoskeletal: Negative. Skin: Negative. Neurological: Negative. Endo/Heme/Allergies: Negative. Psychiatric/Behavioral: depression Blood pressure 120/70, pulse 60, temperature 97.4 °F (36.3 °C), temperature source Oral, resp. rate 18, height 5' 5\" (1.651 m), weight 96.6 kg (213 lb), SpO2 97 %. Physical Exam  
Constitutional: She is oriented to person, place, and time and well-developed, well-nourished, and in no distress. HENT:  
Head: Normocephalic and atraumatic. Eyes: EOM are normal. Pupils are equal, round, and reactive to light. Neck: Normal range of motion. Neck supple. No thyromegaly present. Cardiovascular: Normal rate and regular rhythm. Pulmonary/Chest: Effort normal. She has no wheezes. She has no rales. Abdominal: Soft. Bowel sounds are normal.  
Musculoskeletal: Normal range of motion. She exhibits no edema. Neurological: She is alert and oriented to person, place, and time. Skin: Skin is warm and dry. Psychiatric: Affect and judgment normal.  
 
 
Investigation: 
 
 
Results from Hospital Encounter encounter on 07/29/18 CT CHEST WO CONT Narrative CT chest without contrast  
 
HISTORY: Persistent cough COMPARISON: CT 9/12/16 TECHNIQUE: Helical scans through the chest is obtained from the thoracic inlet 
to the diaphragm without IV contrast administration. All CT scans at this facility performed using dose optimization techniques as 
appreciated to a performed exam, to include automated exposure control, 
adjustment of the mA and or KU according to patient size (including appropriate 
matching for site specific examination), or use of iterative reconstruction 
technique. FINDINGS: The study is suboptimal due to lack of IV contrast.  Subtle 
abnormality can be under detected. Lung Parenchyma: There is a small wedge-shaped pleural-based consolidation at 
lateral left lingular lobe. This shows interval enlargement as more likely 
enlarged atelectasis by appearance. The lungs are otherwise clear. No acute 
infiltration or focal pulmonary finding. Thyroid: Unremarkable. Mediastinum: No adenopathy. Pleural Space And Chest Wall: No significant pleural pathology. There is 
chronic cystic lesion identified in the medial aspect of left breast abutting 
the pectoralis muscle with slightly decreased in size compared to the prior CT 
which was 2.7 x 4.0 cm and now 3.1 x 3.7 cm. Small surgical staples again noted 
along the posterior wall of the lesion. The superior left chest wall pacemaker 
again noted. Heart: The heart and the pericardium appear normal. 
 
Vasculature: The aorta and the great vessels are unremarkable. Imaged Upper Abdomen: Unremarkable. Osseous Structures: Unremarkable for age. Impression IMPRESSION:  
 
1. Small wedge-shaped consolidation at lateral left lingular lobe which is 
slightly enlarged but favorable for atelectasis. 2. No other significant pulmonary finding. 3. Chronic cystic lesion at posterior medial left breast is slightly decreased 
in size, favorable for chronic postop seroma. Thank you for your referral.     
 
 
CT chest (05/10/16): There are filling defects in the upper, middle, and lower lobe branches of the right pulmonary artery. No evidence of embolus in the left pulmonary artery branches or in the central trunks. The main pulmonary artery measures 3.3 cm in diameter. The thoracic aorta is not aneurysmal.  No evidence for dissection. 
   
Lymph nodes: Mediastinal lymph nodes are mildly prominent. A right paratracheal lymph node measures 1.2 x 0.9 cm. Previously, this lymph node measured 1.8 x 0.4 cm. There is no evidence of hilar lymphadenopathy. 
   
Thyroid: Normal in size without mass in the visualized parenchyma. . 
   
Mediastinum: There is a pacemaker in the left chest wall. A fluid collection in the inferior left breast measures about 3.3 x 4.8 cm (previously, 2.8 x 4.4 cm). There is no evidence of peripheral enhancement. 2 adjacent biopsy clips are redemonstrated. There is a small hiatal hernia. 
   
Lungs:   
The lungs are diffusely hyperinflated with mild tracheobronchomalacia. Right Lung: There is a small posterior effusion with overlying atelectasis. No confluent infiltrate to suggest infarct. 
   
Left Lung:  No evidence of infiltrate. There is a small focus of atelectasis in the posterior lingula. This is similar to previous exam. No pleural effusion. 
   
Abdomen: There is diffuse fatty atrophy of the pancreas. Visualized portions of the liver, spleen, pancreas, adrenal glands, and kidneys are unremarkable.  The gallbladder is normal. 
   
 Bones: A fracture deformity in the left anterior fifth rib is unchanged. Degenerative changes of the spine are stable. 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 There are no destructive lesions. 
   
IMPRESSION: 
   
1.  Multiple right pulmonary emboli. No evidence of left pulmonary emboli or pulmonary infarcts. Sudie Mar 2.  Small right pleural effusion. 3. Prominence of the main portal artery is suggestive of pulmonary artery hypertension. 4. COPD and mild tracheobronchomalacia. 5. Similar size of seroma in the left chest wall. 
  .      
  
PFT(2008): mild obstructive changes DATE 4/19/2017  Pre bronchodilator ( 2008) Post Bronchodilator (2008) FVC 2. 11( 69)  2.12(67) 2.13(67) FEV1 1.60( 69)  1.69(73) 1.64(71) FEV1/FVC 76  79(73) 76 TLC 4.04(78)  4.32(84)    
RV 2.14(79)  2.12(107)    
RV/TLC   49(38)    
DLCO 13.76( 61)  12.47(66)    
  
PFT 4/19/17: 
Mild to moderate restrictive impairment with reduced DLCO. Assessment: 
 
Bronchiectasis: Recurrent bronchitis/COPD exacerbation with Mild-Moderate Restrictive impairment ? sequelae of prior inflammatory process. Recurrant bronchitis- lower respiratory tract infections treated with antibiotics. Elevated IgE raises ? Allergic triggers and possible role for additional therapy History of COPD - exertional hypoxemia in past has now improved. Nonspecific Mediastinal adenopathy- S/P follow up CT chest demonstrated interval resolution  9/2016 CT. PE on NOAC ,5/206. Resolved on follow up CT in 9/2016 Atrial fibrillation- on anticoagulation, cardizem for rate control H/o breast biopsy- 2010. H/o radiation Depression 
  
  
Plan: Will check sputum eosinophils and sputum culture. Will consider addition therapy accordingly Discussed elevated IgE level Action plan for acute exacerbations discussed Continue Incruse daily- prescription refilled Continue Albuterol two puffs, every six hours as needed Continue ELIQUIS as prescribed . Cardiology following for A. Fib, AICD Encouraged active lifestyle and endurance activities. Preventive vaccinations-  
RTC in 3  months time and sooner if needed. 
  
 
[facility]

## 2018-09-12 NOTE — MR AVS SNAPSHOT
[address] [facility], [address] [address] 
[phone] Patient: Maria C Johnson MRN: [id number] :1945 Visit Information Date & Time Provider Department Dept. Phone Encounter #  
 2018 10:30 AM MD Brandie Mcclelland [facility] [phone] [id number] Follow-up Instructions Return in about 3 months (around 2018). Your Appointments 10/11/2018  2:00 PM  
PROCEDURE with Pacer Seamus [facility]\"" ([facility]) Appt Note: w/ Dr Alicia Mealing appt; device check [place] [phone] [place] [phone]  
  
    
 2019  1:40 PM  
Follow Up with Maribell Montoya MD  
[facility] \[facility]\"" ([address] [place] [address] Road) Appt Note: 6 mo f/u  
 [address] [place] [address] [phone]  
[phone] [facility] [address] Upcoming Health Maintenance Date Due DTaP/Tdap/Td series (1 - Tdap) 1965 BREAST CANCER SCRN MAMMOGRAM 1994 FOBT Q 1 YEAR AGE 50-75 1994 ZOSTER VACCINE AGE 60> 10/27/2004 GLAUCOMA SCREENING Q2Y 2009 Pneumococcal 65+ Low/Medium Risk (2 of 2 - PCV13) 2017 MEDICARE YEARLY EXAM 3/14/2018 Influenza Age 5 to Adult 2018 Allergies as of 2018  Review Complete On: 2018 By: Sigifredo Lindsay LPN Severity Noted Reaction Type Reactions Lisinopril High 2010    Rash Hypertensive crisis Tetracycline High 2012    Anaphylaxis, Rash, Swelling Hibiclens [Chlorhexidine Gluconate]  2017    Other (comments) Turned red and BP drop low. Other Medication  2010    Rash, Swelling  
 -Myacins Current Immunizations  Reviewed on 2017 Name Date Pneumococcal Polysaccharide (PPSV-23) 11/8/2016 Not reviewed this visit You Were Diagnosed With   
  
 Codes Comments Bronchiectasis without complication ([facility]    -  Primary ICD-10-CM: J47.9 ICD-9-CM: 494.0 Asthma-COPD overlap syndrome ([facility].)     ICD-10-CM: J44.9 ICD-9-CM: 493.20 Other pulmonary embolism without acute cor pulmonale, unspecified chronicity (HCC)     ICD-10-CM: I26.99 
ICD-9-CM: 415.19 PAF (paroxysmal atrial fibrillation) (HCC)     ICD-10-CM: I48.0 ICD-9-CM: 427.31 Vitals BP Pulse Temp Resp Height(growth percentile) Weight(growth percentile) 120/70 (BP 1 Location: Left arm, BP Patient Position: Sitting) 60 97.4 °F (36.3 °C) (Oral) 18 5' 5\" (1.651 m) 213 lb (96.6 kg) SpO2 BMI OB Status Smoking Status 97% 35.45 kg/m2 Postmenopausal Former Smoker BMI and BSA Data Body Mass Index Body Surface Area  
 35.45 kg/m 2 2.1 m 2 Preferred Pharmacy Pharmacy Name Phone [address] [phone] Your Updated Medication List  
  
   
This list is accurate as of 9/12/18 11:29 AM.  Always use your most recent med list.  
  
  
  
  
 albuterol sulfate 90 mcg/actuation Aepb Commonly known as:  Lowella Odilon Take 2 Puffs by inhalation every four (4) hours as needed. CARDIZEM  mg ER capsule Generic drug:  dilTIAZem CD Take 240 mg by mouth daily. CENTRUM SILVER WOMEN PO Take  by mouth. CLARITIN 10 mg tablet Generic drug:  loratadine Take 10 mg by mouth. CO Q-10 10 mg Cap Generic drug:  coenzyme q10 Take  by mouth. ELIQUIS 5 mg tablet Generic drug:  apixaban Take 1 Tab by mouth two (2) times a day. escitalopram oxalate 20 mg tablet Commonly known as:  Arty [place] Take 20 mg by mouth daily. exemestane 25 mg tablet Commonly known as:  Suri Slipper Take 25 mg by mouth daily. fluticasone 50 mcg/actuation nasal spray Commonly known as:  Laina Fetch 2 Sprays by Both Nostrils route daily. LIPITOR 20 mg tablet Generic drug:  atorvastatin Take  by mouth daily. metFORMIN 500 mg Tg24 24 hour tablet Commonly known asNickolas Nettle ER Take  by mouth.  
  
 metoprolol tartrate 100 mg IR tablet Commonly known as:  LOPRESSOR  
TAKE ONE TABLET BY MOUTH TWICE DAILY  
  
 umeclidinium 62.5 mcg/actuation inhaler Commonly known as:  INCRUSE ELLIPTA Take 1 Puff by inhalation daily. Prescriptions Sent to Pharmacy Refills  
 umeclidinium (INCRUSE ELLIPTA) 62.5 mcg/actuation inhaler 6 Sig: Take 1 Puff by inhalation daily. Class: Normal  
 Pharmacy: [address] [facility] [address] #: [phone] Route: Inhalation Follow-up Instructions Return in about 3 months (around 12/12/2018). To-Do List   
 09/12/2018 Microbiology:  CULTURE, RESPIRATORY/SPUTUM/BRONCH Verneice Gasmen STAIN   
  
 09/12/2018 Lab:  EOSINOPHIL SMEAR, SPUTUM Introducing [facility]! [address] [place] [address] introduces [facility] patient portal. Now you can access parts of your medical record, email your doctor's office, and request medication refills online. 1. In your internet browser, go to [email or web address] 2. Click on the First Time User? Click Here link in the Sign In box. You will see the New Member Sign Up page. 3. Enter your [facility] Access Code exactly as it appears below. You will not need to use this code after youve completed the sign-up process. If you do not sign up before the expiration date, you must request a new code. · [facility] Access Code: [email or web address] Expires: 10/27/2018  2:07 PM 
 
4. Enter the last four digits of your Social Security Number (xxxx) and Date of Birth (mm/dd/yyyy) as indicated and click Submit. You will be taken to the next sign-up page. 5. Create a [facility] ID.  This will be your [facility] login ID and cannot be changed, so think of one that is secure and easy to remember. 6. Create a [employer] password. You can change your password at any time. 7. Enter your Password Reset Question and Answer. This can be used at a later time if you forget your password. 8. Enter your e-mail address. You will receive e-mail notification when new information is available in [address] 9. Click Sign Up. You can now view and download portions of your medical record. 10. Click the Download Summary menu link to download a portable copy of your medical information. If you have questions, please visit the Frequently Asked Questions section of the [employer] website. Remember, [employer] is NOT to be used for urgent needs. For medical emergencies, dial 911. Now available from your iPhone and Android! Please provide this summary of care documentation to your next provider. Your primary care clinician is listed as Chago Schreiber. If you have any questions after today's visit, please call [phone].

## 2018-09-12 NOTE — PROGRESS NOTES
Progress Note:Follow up Visit 
 
09/12/18 Patient feels better. Got sick in July- started with bronchitis and progressed to AICD firing and eventual hospitalization. She was treated and discharged and has gradually improved. Upset about several interactions with staff and discussed at length her concerns and addressed as best as possible. Uses Albuterol PRN prior to season changes/weather changes and activity. C/o sinus congestion- planning to see ENT and allergist 
Denies increase in cough or productive expectoration at present. Denies chest pain. C/o RAMOS which limits her ADL's- usually when she is rushing Cannot walk distance/ climb stairs. Not able to exercise due to SOB. 
 
  
HPI: Ms. Drew Millan who carries history of COPD, recurrent bronchitis requiring visit to Urgent care, Diagnosis of PE on NOAC , Cardiomyopathy S/P pacemaker and defibrillator, and history of left breast cancer is evaluated for COPD/Chronic bronchitis work up. Last CT performed in MAY 2016 demonstrated PE, nonspecific mediastinal adenopathy, minimal atelectasis/small effusion, and hyperinflation. We have obtained follow up CT chest performed on 09/12/16 did not reveal any finding concerning PE or Mediastinal adenopathy. . During this interval time patient has not noticed any symptoms concerning COPD exacerbation. Her  RAMOS remains stable. 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 Subsequent follow ups- never has had TB. Allergies Allergen Reactions  Lisinopril Rash Hypertensive crisis  Tetracycline Anaphylaxis, Rash and Swelling  Hibiclens [Chlorhexidine Gluconate] Other (comments) Turned red and BP drop low.  Other Medication Rash and Swelling  
  -Myacins Current Outpatient Prescriptions Medication Sig Dispense Refill  metFORMIN (GLUMETZA ER) 500 mg TG24 24 hour tablet Take  by mouth.  coenzyme q10 (CO Q-10) 10 mg cap Take  by mouth.  atorvastatin (LIPITOR) 20 mg tablet Take  by mouth daily.  fluticasone (FLONASE) 50 mcg/actuation nasal spray 2 Sprays by Both Nostrils route daily. 1 Bottle 0  
 umeclidinium (INCRUSE ELLIPTA) 62.5 mcg/actuation inhaler Take 1 Puff by inhalation daily. 1 Inhaler 3  
 albuterol sulfate (PROAIR RESPICLICK) 90 mcg/actuation aepb Take 2 Puffs by inhalation every four (4) hours as needed. 1 Inhaler 3  
 dilTIAZem CD (CARDIZEM CD) 240 mg ER capsule Take 240 mg by mouth daily.  loratadine (CLARITIN) 10 mg tablet Take 10 mg by mouth.  metoprolol tartrate (LOPRESSOR) 100 mg IR tablet TAKE ONE TABLET BY MOUTH TWICE DAILY 180 Tab 3  MULTIVIT-MIN/IRON/FOLIC/LUTEIN (CENTRUM SILVER WOMEN PO) Take  by mouth.  ELIQUIS 5 mg tablet Take 1 Tab by mouth two (2) times a day. 120 Tab 3  
 escitalopram oxalate (LEXAPRO) 20 mg tablet Take 20 mg by mouth daily.  exemestane (AROMASIN) 25 mg tablet Take 25 mg by mouth daily. Review of Systems Constitutional: Negative. HENT: Negative. Eyes: Negative. Respiratory: cough, SOB Cardiovascular: Negative. Gastrointestinal: Negative. Genitourinary: Negative. Musculoskeletal: Negative. Skin: Negative. Neurological: Negative. Endo/Heme/Allergies: Negative. Psychiatric/Behavioral: depression Blood pressure 120/70, pulse 60, temperature 97.4 °F (36.3 °C), temperature source Oral, resp. rate 18, height 5' 5\" (1.651 m), weight 96.6 kg (213 lb), SpO2 97 %. Physical Exam  
Constitutional: She is oriented to person, place, and time and well-developed, well-nourished, and in no distress. HENT:  
Head: Normocephalic and atraumatic. Eyes: EOM are normal. Pupils are equal, round, and reactive to light. Neck: Normal range of motion. Neck supple. No thyromegaly present. Cardiovascular: Normal rate and regular rhythm. Pulmonary/Chest: Effort normal. She has no wheezes. She has no rales. Abdominal: Soft. Bowel sounds are normal.  
Musculoskeletal: Normal range of motion. She exhibits no edema. Neurological: She is alert and oriented to person, place, and time. Skin: Skin is warm and dry. Psychiatric: Affect and judgment normal.  
 
 
Investigation: 
 
 
Results from Hospital Encounter encounter on 07/29/18 CT CHEST WO CONT Narrative CT chest without contrast  
 
HISTORY: Persistent cough COMPARISON: CT 9/12/16 TECHNIQUE: Helical scans through the chest is obtained from the thoracic inlet 
to the diaphragm without IV contrast administration. All CT scans at this facility performed using dose optimization techniques as 
appreciated to a performed exam, to include automated exposure control, 
adjustment of the mA and or KU according to patient size (including appropriate 
matching for site specific examination), or use of iterative reconstruction 
technique. FINDINGS: The study is suboptimal due to lack of IV contrast.  Subtle 
abnormality can be under detected. Lung Parenchyma: There is a small wedge-shaped pleural-based consolidation at 
lateral left lingular lobe. This shows interval enlargement as more likely 
enlarged atelectasis by appearance. The lungs are otherwise clear. No acute 
infiltration or focal pulmonary finding. Thyroid: Unremarkable. Mediastinum: No adenopathy. Pleural Space And Chest Wall: No significant pleural pathology. There is 
chronic cystic lesion identified in the medial aspect of left breast abutting 
the pectoralis muscle with slightly decreased in size compared to the prior CT 
which was 2.7 x 4.0 cm and now 3.1 x 3.7 cm. Small surgical staples again noted 
along the posterior wall of the lesion. The superior left chest wall pacemaker 
again noted. Heart: The heart and the pericardium appear normal. 
 
Vasculature: The aorta and the great vessels are unremarkable. Imaged Upper Abdomen: Unremarkable. Osseous Structures: Unremarkable for age. Impression IMPRESSION:  
 
1. Small wedge-shaped consolidation at lateral left lingular lobe which is 
slightly enlarged but favorable for atelectasis. 2. No other significant pulmonary finding. 3. Chronic cystic lesion at posterior medial left breast is slightly decreased 
in size, favorable for chronic postop seroma. Thank you for your referral.     
 
 
CT chest (05/10/16): There are filling defects in the upper, middle, and lower lobe branches of the right pulmonary artery. No evidence of embolus in the left pulmonary artery branches or in the central trunks. The main pulmonary artery measures 3.3 cm in diameter. The thoracic aorta is not aneurysmal.  No evidence for dissection. 
   
Lymph nodes: Mediastinal lymph nodes are mildly prominent. A right paratracheal lymph node measures 1.2 x 0.9 cm. Previously, this lymph node measured 1.8 x 0.4 cm. There is no evidence of hilar lymphadenopathy. 
   
Thyroid: Normal in size without mass in the visualized parenchyma. . 
   
Mediastinum: There is a pacemaker in the left chest wall. A fluid collection in the inferior left breast measures about 3.3 x 4.8 cm (previously, 2.8 x 4.4 cm). There is no evidence of peripheral enhancement. 2 adjacent biopsy clips are redemonstrated. There is a small hiatal hernia. 
   
Lungs:   
The lungs are diffusely hyperinflated with mild tracheobronchomalacia. Right Lung: There is a small posterior effusion with overlying atelectasis. No confluent infiltrate to suggest infarct. 
   
Left Lung:  No evidence of infiltrate. There is a small focus of atelectasis in the posterior lingula. This is similar to previous exam. No pleural effusion. 
   
Abdomen: There is diffuse fatty atrophy of the pancreas. Visualized portions of the liver, spleen, pancreas, adrenal glands, and kidneys are unremarkable.  The gallbladder is normal. 
   
 Bones: A fracture deformity in the left anterior fifth rib is unchanged. Degenerative changes of the spine are stable. 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 There are no destructive lesions. 
   
IMPRESSION: 
   
1.  Multiple right pulmonary emboli. No evidence of left pulmonary emboli or pulmonary infarcts. Cale Iyer 2.  Small right pleural effusion. 3. Prominence of the main portal artery is suggestive of pulmonary artery hypertension. 4. COPD and mild tracheobronchomalacia. 5. Similar size of seroma in the left chest wall. 
  .      
  
PFT(2008): mild obstructive changes DATE 4/19/2017  Pre bronchodilator ( 2008) Post Bronchodilator (2008) FVC 2. 11( 69)  2.12(67) 2.13(67) FEV1 1.60( 69)  1.69(73) 1.64(71) FEV1/FVC 76  79(73) 76 TLC 4.04(78)  4.32(84)    
RV 2.14(79)  2.12(107)    
RV/TLC   49(38)    
DLCO 13.76( 61)  12.47(66)    
  
PFT 4/19/17: 
Mild to moderate restrictive impairment with reduced DLCO. Assessment: 
 
Bronchiectasis: Recurrent bronchitis/COPD exacerbation with Mild-Moderate Restrictive impairment ? sequelae of prior inflammatory process. Recurrant bronchitis- lower respiratory tract infections treated with antibiotics. Elevated IgE raises ? Allergic triggers and possible role for additional therapy History of COPD - exertional hypoxemia in past has now improved. Nonspecific Mediastinal adenopathy- S/P follow up CT chest demonstrated interval resolution  9/2016 CT. PE on NOAC ,5/206. Resolved on follow up CT in 9/2016 Atrial fibrillation- on anticoagulation, cardizem for rate control H/o breast biopsy- 2010. H/o radiation Depression 
  
  
Plan: Will check sputum eosinophils and sputum culture. Will consider addition therapy accordingly Discussed elevated IgE level Action plan for acute exacerbations discussed Continue Incruse daily- prescription refilled Continue Albuterol two puffs, every six hours as needed Continue ELIQUIS as prescribed . Cardiology following for A. Fib, AICD Encouraged active lifestyle and endurance activities. Preventive vaccinations-  
RTC in 3  months time and sooner if needed. 
  
 
[facility]

## 2018-10-18 ENCOUNTER — CLINICAL SUPPORT (OUTPATIENT)
Dept: CARDIOLOGY CLINIC | Age: 73
End: 2018-10-18

## 2018-10-18 DIAGNOSIS — Z95.810 BIVENTRICULAR IMPLANTABLE CARDIOVERTER-DEFIBRILLATOR IN SITU: ICD-10-CM

## 2018-10-18 DIAGNOSIS — I42.9 CARDIOMYOPATHY, IDIOPATHIC (HCC): Primary | ICD-10-CM

## 2018-10-19 NOTE — PROGRESS NOTES
I have personally seen and evaluated the device findings. Interrogation reviewed and I agree with assessment.     Katie Kevin

## 2019-02-21 ENCOUNTER — CLINICAL SUPPORT (OUTPATIENT)
Dept: CARDIOLOGY CLINIC | Age: 74
End: 2019-02-21

## 2019-02-21 DIAGNOSIS — Z95.810 BIVENTRICULAR IMPLANTABLE CARDIOVERTER-DEFIBRILLATOR IN SITU: ICD-10-CM

## 2019-02-21 DIAGNOSIS — I42.9 CARDIOMYOPATHY, IDIOPATHIC (HCC): Primary | ICD-10-CM

## 2019-03-04 ENCOUNTER — OFFICE VISIT (OUTPATIENT)
Dept: CARDIOLOGY CLINIC | Age: 74
End: 2019-03-04

## 2019-03-04 VITALS
DIASTOLIC BLOOD PRESSURE: 88 MMHG | WEIGHT: 213 LBS | HEART RATE: 68 BPM | BODY MASS INDEX: 35.49 KG/M2 | OXYGEN SATURATION: 97 % | HEIGHT: 65 IN | SYSTOLIC BLOOD PRESSURE: 134 MMHG

## 2019-03-04 DIAGNOSIS — I42.9 CARDIOMYOPATHY, IDIOPATHIC (HCC): Primary | ICD-10-CM

## 2019-03-04 DIAGNOSIS — I48.0 PAF (PAROXYSMAL ATRIAL FIBRILLATION) (HCC): ICD-10-CM

## 2019-03-04 DIAGNOSIS — Z95.810 BIVENTRICULAR IMPLANTABLE CARDIOVERTER-DEFIBRILLATOR IN SITU: ICD-10-CM

## 2019-03-04 RX ORDER — PRAVASTATIN SODIUM 80 MG/1
80 TABLET ORAL
COMMUNITY
End: 2021-03-24 | Stop reason: SDUPTHER

## 2019-03-04 NOTE — PROGRESS NOTES
Khurram Schultz presents today for   Chief Complaint   Patient presents with    Follow-up     6 month     Shortness of Breath     everyday mostly with exertion     Leg Swelling     both legs mostly on the right        Khurram Ma preferred language for health care discussion is english/other. Is someone accompanying this pt? No     Is the patient using any DME equipment during OV? No     Depression Screening:  3 most recent PHQ Screens 9/12/2018   Little interest or pleasure in doing things More than half the days   Feeling down, depressed, irritable, or hopeless More than half the days   Total Score PHQ 2 4       Learning Assessment:  Learning Assessment 10/7/2015   PRIMARY LEARNER Patient   PRIMARY LANGUAGE ENGLISH   LEARNER PREFERENCE PRIMARY LISTENING   ANSWERED BY patient   RELATIONSHIP SELF       Abuse Screening:  No flowsheet data found. Fall Risk  Fall Risk Assessment, last 12 mths 9/12/2018   Able to walk? Yes   Fall in past 12 months? No       Pt currently taking Antiplatelet therapy? Eliquis     Coordination of Care:  1. Have you been to the ER, urgent care clinic since your last visit? Hospitalized since your last visit? No     2. Have you seen or consulted any other health care providers outside of the [address] [place] [address] since your last visit? Include any pap smears or colon screening.  NO

## 2019-03-04 NOTE — PROGRESS NOTES
HISTORY OF PRESENT ILLNESS  Hortencia Ceballos is a 75 y.o. female. HPI    She has been feeling well. She denies chest pain, dyspnea, orthopnea, PND. She denies palpitation, dizziness or syncope. She denies any symptoms of TIA or amaurosis fugax. Interrogation of her ICD demonstrated normal OptiVol and no arrhythmia. There was no recurrent atrial fibrillation. She has history of a left bundle branch block, hypertension, dyslipidemia and glucose intolerance. She has a 20 pack a year cigarette smoking history until 1988. She was told that she had a heart murmur and hole in her heart as a child, but this has not been clearly documented by echocardiogram or other diagnostic workup. She was seen by Dr. Александр Vallejo in 2003, for the evaluation of left bundle branch block and had negative stress nuclear cardiac imaging. She was told that everything was okay by Dr. Александр Vallejo at that time. She had repeat stress nuclear cardiac imaging on September 17, 2007, which demonstrated a moderate, fixed perfusion defect in the basal inferior and mid inferior wall, involving the inferior apex as well, with no significant ischemia. There was also moderately severe scarring in the anterior wall, anterior apex and anterior septum, with very mild associated ischemia. There was an akinetic interventricular septum and anterior apex, and mild hypokinesis of the proximal anterior wall, with moderate hypokinesis of the entire inferior wall, with overall EF in the 25% range. She subsequently underwent cardiac catheterization on February 27, 2008 which demonstrated patent coronary arteries, but severe LV dysfunction with EF in the 25-30% range. She then went on to have biventricular ICD implantation on May 12, 2008. Her EF has improved to 56% by repeat echocardiogram in 2009. She had a repeat echocardiogram on August 13, 2010, which demonstrated once again improved LV function, with EF in the 55% range. Left atrial volume was normal at 23 ml/m².  PA pressure was estimated normal as well.    Because of shortness of breath she had repeat echocardiogram on 4/16/2012 with demonstrated normal LV function with the EF in the 60 to 65% range. There was grade 1 diastolic dysfunction. PA pressure was estimated in the range of 25 to 30 mmHg. There was no significant valvular pathology.    She had an ultrasound examination of the aorta, which demonstrated possible aneurysm; however, the CT scan demonstrated no evidence of aneurysm whatsoever. She does not feel the palpitations much herself; however, her ICD interrogation demonstrated frequent episodes of atrial fibrillation, at times, very prolonged for hours. She had problems with worsening shortness of breath and she developed a cough and fever, and was seen in the emergency room on 05/30/16. She was ultimately diagnosed of DVT and pulmonary emboli by CT angiogram and has been placed on Eliquis. Her echocardiogram on 07/07/2016 demonstrated normal left ventricular size and lower limit of normal left ventricular systolic function with EF in the 50% range.  There was no significant valvular pathology.  Left atrial dimension was normal with a volume index of 25 mL/meter2.  There was no significant pulmonary hypertension.       She developed rapid atrial fibrillation associated with pneuminia and subsequently shocked by her ICD because of the fast heart rate in July 2018. In the hospital, there was no evidence of decompensated congestive heart failure. She had an echocardiogram on 7/30/18 which demonstrated normal left ventricular systolic function with EF in the 55-60% range. PA pressure was estimated at 40 mmHg. There was no significant valvular pathology. The left atrial dimension was normal with volume index of 31 ml/m sq. The interrogation of the ICD demonstrated once again the episode of left atrial fibrillation and ICD shock on 7/29/18. Her OptiVol was normal indicating normal volume status at that time.     Review of Systems   Constitutional: Negative for malaise/fatigue and weight loss. HENT: Negative for hearing loss. Eyes: Negative for blurred vision and double vision. Respiratory: Negative for shortness of breath. Cardiovascular: Negative for chest pain, palpitations, orthopnea, claudication, leg swelling and PND. Gastrointestinal: Negative for blood in stool, heartburn and melena. Genitourinary: Negative for dysuria, frequency, hematuria and urgency. Musculoskeletal: Negative for back pain and joint pain. Skin: Negative for itching and rash. Neurological: Negative for dizziness, loss of consciousness and weakness. Psychiatric/Behavioral: Negative for depression and memory loss. Physical Exam   Constitutional: She is oriented to person, place, and time. She appears well-developed and well-nourished. HENT:   Head: Normocephalic and atraumatic. Eyes: Conjunctivae are normal. Pupils are equal, round, and reactive to light. Neck: Normal range of motion. Neck supple. No JVD present. Cardiovascular: Normal rate, regular rhythm, S1 normal and S2 normal.  No extrasystoles are present. PMI is not displaced. Exam reveals no gallop and no friction rub. Murmur heard. Harsh midsystolic murmur is present with a grade of 1/6 at the upper right sternal border. Pulses:       Carotid pulses are 3+ on the right side, and 3+ on the left side. Pulmonary/Chest: Effort normal. She has no rales. Abdominal: Soft. There is no tenderness. Musculoskeletal: She exhibits no edema. Neurological: She is alert and oriented to person, place, and time. No cranial nerve deficit. Skin: Skin is warm and dry. Psychiatric: She has a normal mood and affect.  Her behavior is normal.     Visit Vitals  /88   Pulse 68   Ht 5' 5\" (1.651 m)   Wt 96.6 kg (213 lb)   SpO2 97%   BMI 35.45 kg/m²       Past Medical History:   Diagnosis Date    CAD (coronary artery disease)     cardiomyopathy,CHF,,Cardiac Cath, pacemaker/defib.  Cancer [facility]) 2013    breast    Cardiac catheterization 02/27/2008    Patent coronary arteries. LVEDP 13 mmHg. EF 25-30%. Global hypk.  Cardiac echocardiogram 07/07/2016    EF 50% (prev 60-65% on study of 4/16/12). No WMA. Gr 1 DDfx. Normal RVSP.  Cardiac nuclear imaging test 05/31/2013    No evidence of ongoing ischemia or prior infarction. EF 60%. No RWMA. Mild dyssynchrony of inferior base & mid/distal septum likely c/w pacemaker. Nondiagnostic EKG on pharm stress test due to V-pacing.  Cardiovascular abdominal aorta duplex 09/23/2015    Fusiform AAA in prox & mid portions of abdom Ao.     Chronic lung disease     COPD (chronic obstructive pulmonary disease) (HCC) chronic bronchitis    Coronary artery disease Feb. 2008    Possible CAD with nonischemic dilated cardiomyopathy/EF 25%/ improved EF up to 56% by echocardiogram.    Coronary artery disease     Diabetes mellitus ([address]     GERD (gastroesophageal reflux disease)     Heart failure (HCC)     Heart murmur     Hemangioma of liver     Hx of cardiomyopathy ([address]     Hypercholesterolemia     Hypertension     Left bundle branch block     Phlebitis     PVD (peripheral vascular disease) ([address]     Renal calculi 1999    Thromboembolus ([address]     below knee in R leg       Social History     Socioeconomic History    Marital status:      Spouse name: Not on file    Number of children: Not on file    Years of education: Not on file    Highest education level: Not on file   Social Needs    Financial resource strain: Not on file    Food insecurity - worry: Not on file    Food insecurity - inability: Not on file   Flossonic needs - medical: Not on file   Fl[employer]ic needs - non-medical: Not on file   Occupational History    Not on file   Tobacco Use    Smoking status: Former Smoker     Packs/day: 1.50     Years: 20.00     Pack years: 30.00     Types: Cigarettes     Last attempt to quit: 1988     Years since quittin.0    Smokeless tobacco: Never Used   Substance and Sexual Activity    Alcohol use: No    Drug use: No    Sexual activity: Not on file   Other Topics Concern    Not on file   Social History Narrative    Not on file       Family History   Problem Relation Age of Onset    Cancer Mother         Cancer of the breast    Stroke Mother     Cancer Father     Heart Disease Father         CHF    COPD Brother     Heart Attack Brother         Multiple    Heart Disease Brother         CHF       Past Surgical History:   Procedure Laterality Date    COLONOSCOPY N/A 3/7/2017    COLONOSCOPY, SURVEILLANCE with polypectomy performed by Claude Shore, MD at [facility] HX BREAST LUMPECTOMY Left 2013    cancerous lesion    HX CATARACT REMOVAL Bilateral 1980s    w/ lens implants    HX CATARACT REMOVAL Left     re vised due to implant repositioning    HX HEART CATHETERIZATION  08    Patent coronary arteries, but severe LV dysfunction w/ EF in the 25-30% range.  HX KNEE ARTHROSCOPY Right     HX MASTECTOMY Left 2014    LEFT PARTIAL MASTECTOMY WITH NEEDLE LOCALIZATION MAMMOGRAM TIMES TWO performed by Adrian Simons MD at [address] [place] [address] HX PACEMAKER  May 2008    Biventricular ICD implantation     HX PACEMAKER      replacement - Medtronic    HX PACEMAKER PLACEMENT      HX PELVIC LAPAROSCOPY      diagnostic - varicosities       Current Outpatient Medications   Medication Sig Dispense Refill    pravastatin (PRAVACHOL) 40 mg tablet Take 40 mg by mouth nightly.  metFORMIN (GLUMETZA ER) 500 mg TG24 24 hour tablet Take  by mouth two (2) times a day.  coenzyme q10 (CO Q-10) 10 mg cap Take  by mouth.  umeclidinium (INCRUSE ELLIPTA) 62.5 mcg/actuation inhaler Take 1 Puff by inhalation daily. 1 Inhaler 6    fluticasone (FLONASE) 50 mcg/actuation nasal spray 2 Sprays by Both Nostrils route daily.  1 Bottle 0    albuterol sulfate (PROAIR RESPICLICK) 90 mcg/actuation aepb Take 2 Puffs by inhalation every four (4) hours as needed. 1 Inhaler 3    dilTIAZem CD (CARDIZEM CD) 240 mg ER capsule Take 240 mg by mouth daily.  loratadine (CLARITIN) 10 mg tablet Take 10 mg by mouth.  metoprolol tartrate (LOPRESSOR) 100 mg IR tablet TAKE ONE TABLET BY MOUTH TWICE DAILY 180 Tab 3    MULTIVIT-MIN/IRON/FOLIC/LUTEIN (CENTRUM SILVER WOMEN PO) Take  by mouth.  ELIQUIS 5 mg tablet Take 1 Tab by mouth two (2) times a day. 120 Tab 3    escitalopram oxalate (LEXAPRO) 20 mg tablet Take 20 mg by mouth daily.  exemestane (AROMASIN) 25 mg tablet Take 25 mg by mouth daily. EKG: unchanged from previous tracings, 1:1 Biv.pacing  . ASSESSMENT and PLAN  Encounter Diagnoses   Name Primary?  Cardiomyopathy, idiopathic, EF now up to 55% Yes    PAF (paroxysmal atrial fibrillation) (HCC)     Biventricular implantable cardioverter-defibrillator in situ    She has been doing well. She has had no symptoms to indicate angina or cardiac decompensation. She has had no recurrent atrial fibrillation. For now, continue on current medical regimen.

## 2019-03-08 RX ORDER — DILTIAZEM HYDROCHLORIDE 240 MG/1
240 CAPSULE, COATED, EXTENDED RELEASE ORAL DAILY
Qty: 30 CAP | Refills: 11 | Status: SHIPPED | OUTPATIENT
Start: 2019-03-08 | End: 2020-03-25

## 2019-03-25 ENCOUNTER — TELEPHONE (OUTPATIENT)
Dept: CARDIOLOGY CLINIC | Age: 74
End: 2019-03-25

## 2019-03-25 NOTE — TELEPHONE ENCOUNTER
Tried to schedule appt with Dr. Yenifer Myrick and he had nothing for a couple months so they suggested Tonia Gupta. I accepted and appt. Then called the patient to see if it was ok with her. She said absolutly not! She wanted Yenifer Myrick because Dr. Mariza Sol thought he was the best.  She cursed me and told me what  Did not have available openings for sick people? I told her I would Call Dr. Yenifer Myrick back and see if they can do something about getting her in. She said forget it and cursed again and hung up on me. I called Dr Jamin Medrano. Office and cx appt for Jose Francisco Felton. And left msg. For Dr. Guillermo Chavez nurse to call me Tue. To see if we can get her in.   Boo Negron

## 2019-04-04 RX ORDER — METOPROLOL TARTRATE 100 MG/1
TABLET ORAL
Qty: 180 TAB | Refills: 3 | Status: SHIPPED | OUTPATIENT
Start: 2019-04-04 | End: 2020-06-26

## 2019-06-03 ENCOUNTER — HOSPITAL ENCOUNTER (OUTPATIENT)
Dept: VASCULAR SURGERY | Age: 74
Discharge: HOME | End: 2019-06-03
Attending: INTERNAL MEDICINE
Payer: MEDICARE

## 2019-06-03 DIAGNOSIS — M79.604 RIGHT LEG PAIN: ICD-10-CM

## 2019-06-03 PROCEDURE — 93971 EXTREMITY STUDY: CPT

## 2019-06-06 ENCOUNTER — CLINICAL SUPPORT (OUTPATIENT)
Dept: CARDIOLOGY CLINIC | Age: 74
End: 2019-06-06

## 2019-06-06 DIAGNOSIS — Z95.810 BIVENTRICULAR IMPLANTABLE CARDIOVERTER-DEFIBRILLATOR IN SITU: ICD-10-CM

## 2019-06-06 DIAGNOSIS — I42.9 CARDIOMYOPATHY, IDIOPATHIC (HCC): Primary | ICD-10-CM

## 2019-06-06 DIAGNOSIS — E78.5 DYSLIPIDEMIA: ICD-10-CM

## 2019-06-10 NOTE — PROGRESS NOTES
I have personally seen and evaluated the device findings. Interrogation reviewed and I agree with assessment.     Keena Jiang

## 2019-08-27 ENCOUNTER — CLINICAL SUPPORT (OUTPATIENT)
Dept: CARDIOLOGY CLINIC | Age: 74
End: 2019-08-27

## 2019-08-27 ENCOUNTER — OFFICE VISIT (OUTPATIENT)
Dept: CARDIOLOGY CLINIC | Age: 74
End: 2019-08-27

## 2019-08-27 VITALS
HEART RATE: 60 BPM | BODY MASS INDEX: 35.65 KG/M2 | SYSTOLIC BLOOD PRESSURE: 100 MMHG | DIASTOLIC BLOOD PRESSURE: 62 MMHG | HEIGHT: 65 IN | WEIGHT: 214 LBS | OXYGEN SATURATION: 96 %

## 2019-08-27 DIAGNOSIS — R07.89 OTHER CHEST PAIN: ICD-10-CM

## 2019-08-27 DIAGNOSIS — I42.9 CARDIOMYOPATHY, IDIOPATHIC (HCC): Primary | ICD-10-CM

## 2019-08-27 DIAGNOSIS — I48.0 PAF (PAROXYSMAL ATRIAL FIBRILLATION) (HCC): ICD-10-CM

## 2019-08-27 DIAGNOSIS — Z95.810 BIVENTRICULAR IMPLANTABLE CARDIOVERTER-DEFIBRILLATOR IN SITU: ICD-10-CM

## 2019-08-27 NOTE — PROGRESS NOTES
HISTORY OF PRESENT ILLNESS  Lorenzo Franklin is a 75 y.o. female. HPI  She is complaining of a fleeting type chest pain in the upper chest which has been a migratory type. It lasts only for a few seconds at a time. She has had no exertional chest discomfort. She denies dyspnea, orthopnea, PND. She denies palpitations, dizziness or syncope. She has had no symptoms to indicate TIA or amaurosis fugax. The interrogation of her ICD demonstrated normal OptiVol and no tachyarrhythmia. There is no atrial fibrillation. She does have a great deal of knee pain and contemplating knee surgery in the future. She has history of a left bundle branch block, hypertension, dyslipidemia and glucose intolerance. She has a 20 pack a year cigarette smoking history until 1988. She was told that she had a heart murmur and hole in her heart as a child, but this has not been clearly documented by echocardiogram or other diagnostic workup. She was seen by Dr. Margorie Cranker in 2003, for the evaluation of left bundle branch block and had negative stress nuclear cardiac imaging. She was told that everything was okay by Dr. Margorie Cranker at that time. She had repeat stress nuclear cardiac imaging on September 17, 2007, which demonstrated a moderate, fixed perfusion defect in the basal inferior and mid inferior wall, involving the inferior apex as well, with no significant ischemia. There was also moderately severe scarring in the anterior wall, anterior apex and anterior septum, with very mild associated ischemia. There was an akinetic interventricular septum and anterior apex, and mild hypokinesis of the proximal anterior wall, with moderate hypokinesis of the entire inferior wall, with overall EF in the 25% range. She subsequently underwent cardiac catheterization on February 27, 2008 which demonstrated patent coronary arteries, but severe LV dysfunction with EF in the 25-30% range.  She then went on to have biventricular ICD implantation on May 12, 2008. Her EF has improved to 56% by repeat echocardiogram in 2009. She had a repeat echocardiogram on August 13, 2010, which demonstrated once again improved LV function, with EF in the 55% range. Left atrial volume was normal at 23 ml/m². PA pressure was estimated normal as well.    Because of shortness of breath she had repeat echocardiogram on 4/16/2012 with demonstrated normal LV function with the EF in the 60 to 65% range. There was grade 1 diastolic dysfunction. PA pressure was estimated in the range of 25 to 30 mmHg. There was no significant valvular pathology.    She had an ultrasound examination of the aorta, which demonstrated possible aneurysm; however, the CT scan demonstrated no evidence of aneurysm whatsoever. She does not feel the palpitations much herself; however, her ICD interrogation demonstrated frequent episodes of atrial fibrillation, at times, very prolonged for hours. She had problems with worsening shortness of breath and she developed a cough and fever, and was seen in the emergency room on 05/30/16. She was ultimately diagnosed of DVT and pulmonary emboli by CT angiogram and has been placed on Eliquis. Her echocardiogram on 07/07/2016 demonstrated normal left ventricular size and lower limit of normal left ventricular systolic function with EF in the 50% range.  There was no significant valvular pathology.  Left atrial dimension was normal with a volume index of 25 mL/meter2.  There was no significant pulmonary hypertension.       She developed rapid atrial fibrillation associated with pneuminia and subsequently shocked by her ICD because of the fast heart rate in July 2018.  In the hospital, there was no evidence of decompensated congestive heart failure. She had an echocardiogram on 7/30/18 which demonstrated normal left ventricular systolic function with EF in the 55-60% range. PA pressure was estimated at 40 mmHg. There was no significant valvular pathology.  The left atrial dimension was normal with volume index of 31 ml/m sq. The interrogation of the ICD demonstrated once again the episode of left atrial fibrillation and ICD shock on 7/29/18. Her OptiVol was normal indicating normal volume status at that time.     Review of Systems   Constitutional: Negative for malaise/fatigue and weight loss. HENT: Negative for hearing loss. Eyes: Negative for blurred vision and double vision. Respiratory: Negative for shortness of breath. Cardiovascular: Positive for chest pain. Negative for palpitations, orthopnea, claudication, leg swelling and PND. Gastrointestinal: Negative for blood in stool, heartburn and melena. Genitourinary: Negative for dysuria, frequency, hematuria and urgency. Musculoskeletal: Positive for back pain and joint pain. Skin: Negative for itching and rash. Neurological: Negative for dizziness and loss of consciousness. Psychiatric/Behavioral: Negative for depression and memory loss. Physical Exam   Constitutional: She is oriented to person, place, and time. She appears well-developed and well-nourished. HENT:   Head: Normocephalic and atraumatic. Eyes: Pupils are equal, round, and reactive to light. Conjunctivae are normal.   Neck: Normal range of motion. Neck supple. No JVD present. Cardiovascular: Normal rate, regular rhythm, S1 normal and S2 normal.  No extrasystoles are present. PMI is not displaced. Exam reveals no gallop and no friction rub. Murmur heard. Harsh midsystolic murmur is present with a grade of 2/6 at the upper right sternal border. Pulses:       Carotid pulses are 3+ on the right side, and 3+ on the left side. Pulmonary/Chest: Effort normal. She has no rales. Abdominal: Soft. There is no tenderness. Musculoskeletal: She exhibits no edema. Neurological: She is alert and oriented to person, place, and time. No cranial nerve deficit. Skin: Skin is warm and dry. Psychiatric: She has a normal mood and affect.  Her behavior is normal.     Visit Vitals  /62   Pulse 60   Ht 5' 5\" (1.651 m)   Wt 97.1 kg (214 lb)   SpO2 96%   BMI 35.61 kg/m²       Past Medical History:   Diagnosis Date    CAD (coronary artery disease)     cardiomyopathy,CHF,,Cardiac Cath, pacemaker/defib.  Cancer [facility]) 2013    breast    Cardiac catheterization 02/27/2008    Patent coronary arteries. LVEDP 13 mmHg. EF 25-30%. Global hypk.  Cardiac echocardiogram 07/07/2016    EF 50% (prev 60-65% on study of 4/16/12). No WMA. Gr 1 DDfx. Normal RVSP.  Cardiac nuclear imaging test 05/31/2013    No evidence of ongoing ischemia or prior infarction. EF 60%. No RWMA. Mild dyssynchrony of inferior base & mid/distal septum likely c/w pacemaker. Nondiagnostic EKG on pharm stress test due to V-pacing.  Cardiovascular abdominal aorta duplex 09/23/2015    Fusiform AAA in prox & mid portions of abdom Ao.     Chronic lung disease     COPD (chronic obstructive pulmonary disease) (HCC) chronic bronchitis    Coronary artery disease Feb. 2008    Possible CAD with nonischemic dilated cardiomyopathy/EF 25%/ improved EF up to 56% by echocardiogram.    Coronary artery disease     Diabetes mellitus ([address]     GERD (gastroesophageal reflux disease)     Heart failure (HCC)     Heart murmur     Hemangioma of liver     Hx of cardiomyopathy ([address]     Hypercholesterolemia     Hypertension     Left bundle branch block     Phlebitis     PVD (peripheral vascular disease) ([address]     Renal calculi 1999    Thromboembolus ([address]     below knee in R leg       Social History     Socioeconomic History    Marital status:      Spouse name: Not on file    Number of children: Not on file    Years of education: Not on file    Highest education level: Not on file   Occupational History    Not on file   Social Needs    Financial resource strain: Not on file    Food insecurity:     Worry: Not on file     Inability: Not on file    Transportation needs: Medical: Not on file     Non-medical: Not on file   Tobacco Use    Smoking status: Former Smoker     Packs/day: 1.50     Years: 20.00     Pack years: 30.00     Types: Cigarettes     Last attempt to quit: 1988     Years since quittin.5    Smokeless tobacco: Never Used   Substance and Sexual Activity    Alcohol use: No    Drug use: No    Sexual activity: Not on file   Lifestyle    Physical activity:     Days per week: Not on file     Minutes per session: Not on file    Stress: Not on file   Relationships    Social connections:     Talks on phone: Not on file     Gets together: Not on file     Attends Orthodox service: Not on file     Active member of club or organization: Not on file     Attends meetings of clubs or organizations: Not on file     Relationship status: Not on file    Intimate partner violence:     Fear of current or ex partner: Not on file     Emotionally abused: Not on file     Physically abused: Not on file     Forced sexual activity: Not on file   Other Topics Concern    Not on file   Social History Narrative    Not on file       Family History   Problem Relation Age of Onset    Cancer Mother         Cancer of the breast    Stroke Mother     Cancer Father     Heart Disease Father         CHF    COPD Brother     Heart Attack Brother         Multiple    Heart Disease Brother         CHF       Past Surgical History:   Procedure Laterality Date    COLONOSCOPY N/A 3/7/2017    COLONOSCOPY, SURVEILLANCE with polypectomy performed by Tenzin Markham MD at [address] [place] [address] HX BREAST LUMPECTOMY Left 2013    cancerous lesion    HX CATARACT REMOVAL Bilateral 1980s    w/ lens implants    HX CATARACT REMOVAL Left     re vised due to implant repositioning    HX HEART CATHETERIZATION  08    Patent coronary arteries, but severe LV dysfunction w/ EF in the 25-30% range.     HX KNEE ARTHROSCOPY Right     HX MASTECTOMY Left 2014    LEFT PARTIAL MASTECTOMY WITH NEEDLE LOCALIZATION MAMMOGRAM TIMES TWO performed by Ezell Schaumann, MD at [address] [place] [address] HX PACEMAKER  May 2008    Biventricular ICD implantation     HX PACEMAKER  2012    replacement - Medtronic    HX PACEMAKER PLACEMENT      HX PELVIC LAPAROSCOPY      diagnostic - varicosities       Current Outpatient Medications   Medication Sig Dispense Refill    metoprolol tartrate (LOPRESSOR) 100 mg IR tablet TAKE ONE TABLET BY MOUTH TWICE DAILY 180 Tab 3    dilTIAZem CD (CARDIZEM CD) 240 mg ER capsule Take 1 Cap by mouth daily. 30 Cap 11    pravastatin (PRAVACHOL) 40 mg tablet Take 40 mg by mouth nightly.  metFORMIN (GLUMETZA ER) 500 mg TG24 24 hour tablet Take  by mouth two (2) times a day.  coenzyme q10 (CO Q-10) 10 mg cap Take  by mouth.  fluticasone (FLONASE) 50 mcg/actuation nasal spray 2 Sprays by Both Nostrils route daily. 1 Bottle 0    albuterol sulfate (PROAIR RESPICLICK) 90 mcg/actuation aepb Take 2 Puffs by inhalation every four (4) hours as needed. 1 Inhaler 3    loratadine (CLARITIN) 10 mg tablet Take 10 mg by mouth.  MULTIVIT-MIN/IRON/FOLIC/LUTEIN (CENTRUM SILVER WOMEN PO) Take  by mouth.  ELIQUIS 5 mg tablet Take 1 Tab by mouth two (2) times a day. 120 Tab 3    escitalopram oxalate (LEXAPRO) 20 mg tablet Take 20 mg by mouth daily.  exemestane (AROMASIN) 25 mg tablet Take 25 mg by mouth daily.  umeclidinium (INCRUSE ELLIPTA) 62.5 mcg/actuation inhaler Take 1 Puff by inhalation daily. 1 Inhaler 6       EKG: unchanged from previous tracings, 1:1 Biv.pacing  . ASSESSMENT and PLAN  Encounter Diagnoses   Name Primary?  Cardiomyopathy, idiopathic, EF now up to 55% Yes    PAF (paroxysmal atrial fibrillation) (HCC)     Biventricular implantable cardioverter-defibrillator in situ     Other chest pain    She has been doing well from a cardiac standpoint. She has had no symptoms of angina or cardiac decompensation. She has had no recurrent tachyarrhythmia.  She is complaining of knee pain and contemplating knee surgery and from a cardiac standpoint there appears to be no contraindication for knee surgery. Her chest pain sounds clearly noncardiac in nature and I would not recommend further diagnostic workup.

## 2019-08-27 NOTE — PROGRESS NOTES
Severo Bounds presents today for   Chief Complaint   Patient presents with    Irregular Heart Beat     6 month     Surgical Clearance     RTK on 10/29/2019 at [facility] [place] with Dr. Brady Ramirez Pacemaker Check    Chest Pain (Angina)     sharp pain on/off 3 months     Shortness of Breath     with exertion    Leg Swelling     both legs on/off       Severo Bounds preferred language for health care discussion is english/other. Is someone accompanying this pt? no    Is the patient using any DME equipment during [address]? no    Depression Screening:  3 most recent PHQ Screens 9/12/2018   Little interest or pleasure in doing things More than half the days   Feeling down, depressed, irritable, or hopeless More than half the days   Total Score PHQ 2 4       Learning Assessment:  Learning Assessment 10/7/2015   PRIMARY LEARNER Patient   PRIMARY LANGUAGE ENGLISH   LEARNER PREFERENCE PRIMARY LISTENING   ANSWERED BY patient   RELATIONSHIP SELF       Abuse Screening:  No flowsheet data found. Fall Risk  Fall Risk Assessment, last 12 mths 9/12/2018   Able to walk? Yes   Fall in past 12 months? No       Pt currently taking Anticoagulant therapy? yes    Coordination of Care:  1. Have you been to the ER, urgent care clinic since your last visit? Hospitalized since your last visit? no    2. Have you seen or consulted any other health care providers outside of the [address] [place] [address] since your last visit? Include any pap smears or colon screening.  no

## 2019-08-27 NOTE — PROGRESS NOTES
I have personally seen and evaluated the device findings. Interrogation reviewed and I agree with assessment.     Jamas Meter

## 2019-11-22 PROBLEM — M81.0 SENILE OSTEOPOROSIS: Status: ACTIVE | Noted: 2019-11-22

## 2019-11-22 RX ORDER — DIPHENHYDRAMINE HYDROCHLORIDE 50 MG/ML
50 INJECTION, SOLUTION INTRAMUSCULAR; INTRAVENOUS AS NEEDED
Status: CANCELLED
Start: 2019-12-02

## 2019-11-22 RX ORDER — EPINEPHRINE 1 MG/ML
0.3 INJECTION, SOLUTION, CONCENTRATE INTRAVENOUS AS NEEDED
Status: CANCELLED | OUTPATIENT
Start: 2019-12-02

## 2019-11-22 RX ORDER — HEPARIN 100 UNIT/ML
300-500 SYRINGE INTRAVENOUS AS NEEDED
Status: CANCELLED
Start: 2019-12-02

## 2019-11-22 RX ORDER — HYDROCORTISONE SODIUM SUCCINATE 100 MG/2ML
100 INJECTION, POWDER, FOR SOLUTION INTRAMUSCULAR; INTRAVENOUS AS NEEDED
Status: CANCELLED | OUTPATIENT
Start: 2019-12-02

## 2019-11-22 RX ORDER — ALBUTEROL SULFATE 0.83 MG/ML
2.5 SOLUTION RESPIRATORY (INHALATION) AS NEEDED
Status: CANCELLED
Start: 2019-12-02

## 2019-11-22 RX ORDER — ACETAMINOPHEN 325 MG/1
650 TABLET ORAL AS NEEDED
Status: CANCELLED
Start: 2019-12-02

## 2019-11-22 RX ORDER — SODIUM CHLORIDE 0.9 % (FLUSH) 0.9 %
10 SYRINGE (ML) INJECTION AS NEEDED
Status: CANCELLED
Start: 2019-12-02

## 2019-11-22 RX ORDER — SODIUM CHLORIDE 9 MG/ML
10 INJECTION INTRAMUSCULAR; INTRAVENOUS; SUBCUTANEOUS AS NEEDED
Status: CANCELLED | OUTPATIENT
Start: 2019-12-02

## 2019-11-22 RX ORDER — ONDANSETRON 2 MG/ML
8 INJECTION INTRAMUSCULAR; INTRAVENOUS AS NEEDED
Status: CANCELLED | OUTPATIENT
Start: 2019-12-02

## 2019-12-02 ENCOUNTER — HOSPITAL ENCOUNTER (OUTPATIENT)
Dept: INFUSION THERAPY | Age: 74
Discharge: HOME | End: 2019-12-02
Payer: MEDICARE

## 2019-12-02 VITALS
RESPIRATION RATE: 16 BRPM | SYSTOLIC BLOOD PRESSURE: 157 MMHG | DIASTOLIC BLOOD PRESSURE: 87 MMHG | OXYGEN SATURATION: 95 % | TEMPERATURE: 97.8 F

## 2019-12-02 DIAGNOSIS — M81.0 SENILE OSTEOPOROSIS: Primary | ICD-10-CM

## 2019-12-02 LAB
ANION GAP BLD CALC-SCNC: 17 MMOL/L (ref 10–20)
BUN BLD-MCNC: 15 MG/DL (ref 7–18)
CA-I BLD-MCNC: 1.23 MMOL/L (ref 1.12–1.32)
CHLORIDE BLD-SCNC: 104 MMOL/L (ref 100–108)
CO2 BLD-SCNC: 25 MMOL/L (ref 19–24)
CREAT UR-MCNC: 0.9 MG/DL (ref 0.6–1.3)
GLUCOSE BLD STRIP.AUTO-MCNC: 120 MG/DL (ref 74–106)
HCT VFR BLD CALC: 44 % (ref 36–49)
HGB BLD-MCNC: 15 G/DL (ref 12–16)
POTASSIUM BLD-SCNC: 3.8 MMOL/L (ref 3.5–5.5)
SODIUM BLD-SCNC: 141 MMOL/L (ref 136–145)

## 2019-12-02 PROCEDURE — 74011250636 HC RX REV CODE- 250/636: Performed by: INTERNAL MEDICINE

## 2019-12-02 PROCEDURE — 80047 BASIC METABLC PNL IONIZED CA: CPT

## 2019-12-02 PROCEDURE — 96365 THER/PROPH/DIAG IV INF INIT: CPT

## 2019-12-02 RX ORDER — SODIUM CHLORIDE 0.9 % (FLUSH) 0.9 %
10 SYRINGE (ML) INJECTION AS NEEDED
Status: CANCELLED
Start: 2020-11-30

## 2019-12-02 RX ORDER — ONDANSETRON 2 MG/ML
8 INJECTION INTRAMUSCULAR; INTRAVENOUS AS NEEDED
Status: CANCELLED | OUTPATIENT
Start: 2020-11-30

## 2019-12-02 RX ORDER — SODIUM CHLORIDE 9 MG/ML
25 INJECTION, SOLUTION INTRAVENOUS CONTINUOUS
Status: DISCONTINUED | OUTPATIENT
Start: 2019-12-02 | End: 2019-12-03 | Stop reason: HOSPADM

## 2019-12-02 RX ORDER — DIPHENHYDRAMINE HYDROCHLORIDE 50 MG/ML
50 INJECTION, SOLUTION INTRAMUSCULAR; INTRAVENOUS AS NEEDED
Status: CANCELLED
Start: 2020-11-30

## 2019-12-02 RX ORDER — SODIUM CHLORIDE 9 MG/ML
10 INJECTION INTRAMUSCULAR; INTRAVENOUS; SUBCUTANEOUS AS NEEDED
Status: CANCELLED | OUTPATIENT
Start: 2020-11-30

## 2019-12-02 RX ORDER — ZOLEDRONIC ACID 5 MG/100ML
5 INJECTION, SOLUTION INTRAVENOUS ONCE
Status: CANCELLED | OUTPATIENT
Start: 2020-11-30

## 2019-12-02 RX ORDER — HEPARIN 100 UNIT/ML
300-500 SYRINGE INTRAVENOUS AS NEEDED
Status: CANCELLED
Start: 2020-11-30

## 2019-12-02 RX ORDER — ZOLEDRONIC ACID 5 MG/100ML
5 INJECTION, SOLUTION INTRAVENOUS ONCE
Status: COMPLETED | OUTPATIENT
Start: 2019-12-02 | End: 2019-12-02

## 2019-12-02 RX ORDER — ACETAMINOPHEN 325 MG/1
650 TABLET ORAL AS NEEDED
Status: CANCELLED
Start: 2020-11-30

## 2019-12-02 RX ORDER — SODIUM CHLORIDE 9 MG/ML
25 INJECTION, SOLUTION INTRAVENOUS CONTINUOUS
Status: CANCELLED | OUTPATIENT
Start: 2020-11-30

## 2019-12-02 RX ORDER — ALBUTEROL SULFATE 0.83 MG/ML
2.5 SOLUTION RESPIRATORY (INHALATION) AS NEEDED
Status: CANCELLED
Start: 2020-11-30

## 2019-12-02 RX ORDER — HYDROCORTISONE SODIUM SUCCINATE 100 MG/2ML
100 INJECTION, POWDER, FOR SOLUTION INTRAMUSCULAR; INTRAVENOUS AS NEEDED
Status: CANCELLED | OUTPATIENT
Start: 2020-11-30

## 2019-12-02 RX ORDER — EPINEPHRINE 1 MG/ML
0.3 INJECTION, SOLUTION, CONCENTRATE INTRAVENOUS AS NEEDED
Status: CANCELLED | OUTPATIENT
Start: 2020-11-30

## 2019-12-02 RX ADMIN — SODIUM CHLORIDE 25 ML/HR: 9 INJECTION, SOLUTION INTRAVENOUS at 13:10

## 2019-12-02 RX ADMIN — ZOLEDRONIC ACID 5 MG: 5 INJECTION, SOLUTION INTRAVENOUS at 13:10

## 2019-12-04 NOTE — PROGRESS NOTES
[facility] Progress Note    Date:     Name: Con Bowman    MRN: [id number]         : 1945      Ms. Shakila Timmons arrived to [facility] at 1140 ambulatory. Ms. Shakila Timmons was assessed and education was provided. Ms. Garay's vitals were reviewed. Visit Vitals  /87 (BP 1 Location: Right arm, BP Patient Position: At rest)   Temp 97.8 °F (36.6 °C)   Resp 16   SpO2 95%   Breastfeeding No       Blood drawn for labs via Right v antecubital right, condition patent and no redness venipuncture x1 attempt. Renal panel was done on Istat. Ionized Calcium 1.23    22 G started in patient right Ac x 1 attempted. Flushed with brisk blood return. Reclast 5 mg  was administer over 15 mins IVP. Ms. Shakila Timmons tolerated well without complaints. Patient refused to stay for observation, patient has received reclast  A year ago,  with no complications. Ms. Shakila Timmons was discharged from [place] [address] in stable condition at 1335. She is to follow up with her PCP for further treatment.      Ann Palma RN  2019

## 2020-02-13 ENCOUNTER — OFFICE VISIT (OUTPATIENT)
Dept: ORTHOPEDIC SURGERY | Facility: CLINIC | Age: 75
End: 2020-02-13

## 2020-02-13 VITALS
HEART RATE: 65 BPM | SYSTOLIC BLOOD PRESSURE: 112 MMHG | WEIGHT: 210.2 LBS | TEMPERATURE: 97.3 F | RESPIRATION RATE: 18 BRPM | OXYGEN SATURATION: 96 % | BODY MASS INDEX: 35.02 KG/M2 | HEIGHT: 65 IN | DIASTOLIC BLOOD PRESSURE: 61 MMHG

## 2020-02-13 DIAGNOSIS — M25.561 CHRONIC PAIN OF RIGHT KNEE: ICD-10-CM

## 2020-02-13 DIAGNOSIS — G89.29 CHRONIC PAIN OF RIGHT KNEE: ICD-10-CM

## 2020-02-13 DIAGNOSIS — M17.11 PRIMARY OSTEOARTHRITIS OF RIGHT KNEE: Primary | ICD-10-CM

## 2020-02-13 DIAGNOSIS — M81.0 OSTEOPOROSIS WITHOUT CURRENT PATHOLOGICAL FRACTURE, UNSPECIFIED OSTEOPOROSIS TYPE: ICD-10-CM

## 2020-02-13 RX ORDER — BETAMETHASONE SODIUM PHOSPHATE AND BETAMETHASONE ACETATE 3; 3 MG/ML; MG/ML
6 INJECTION, SUSPENSION INTRA-ARTICULAR; INTRALESIONAL; INTRAMUSCULAR; SOFT TISSUE ONCE
Qty: 0.5 ML | Refills: 0
Start: 2020-02-13 | End: 2020-02-13

## 2020-02-13 NOTE — PROGRESS NOTES
Patient: Roberto Allred                MRN: [id number]       SSN: [id number]  YOB: 1945        AGE: 75 y.o. SEX: female    PCP: Wayne Burk MD  02/13/20    Chief Complaint   Patient presents with    Knee Pain     Right     HISTORY:  Roberto Allred is a 75 y.o. female who is seen for right knee pain. She has been experiencing knee pain for the past 20+ years. She does not recall any injury. She saw Dr. Cassie Denny in 2005 for right knee pain--she received a cortisone injection. She is s/p right knee arthroscopy 15 years ago but she cannot remember her surgeon's name--still feeling pain. She was recently scheduled for a right TKR with Dr. Brittany Luna. She cancelled her surgery due to conflicts and issues with Dr. Gerhardt Kingdom office and she now presents for a second orthopedic opinion. She notes pain with standing, walking and stair climbing. She experiences startup pain after sitting. She feels like her knee is on fire. She has a h/o breast cancer. She has a pacemaker/defibrillator for cardiomyopathy. 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 She has 1 daughter and 1 son. She is a metformin controlled borderline diabetic. Her highest HA1c was 6.1 and her most recent is 5.9. She lost 10 pounds recently  Ms. Emilia Salazar weighs 210 lbs and is 5'5\" tall.      Lab Results   Component Value Date/Time    Hemoglobin A1c 5.9 (H) 12/20/2019 12:44 PM     Weight Metrics 2/13/2020 8/27/2019 3/4/2019 9/12/2018 8/28/2018 7/30/2018 7/29/2018   Weight 210 lb 3.2 oz 214 lb 213 lb 213 lb 214 lb 210 lb -   BMI 34.98 kg/m2 35.61 kg/m2 35.45 kg/m2 35.45 kg/m2 35.61 kg/m2 - 34.95 kg/m2       Patient Active Problem List   Diagnosis Code    Hypertension I10    GERD (gastroesophageal reflux disease) K21.9    Coronary artery disease I25.10    Cardiomyopathy, idiopathic, EF now up to 55% I42.8    Left bundle branch block I44.7    Dyslipidemia E78.5    Biventricular implantable cardioverter-defibrillator in situ Z95.810    Glucose intolerance (impaired glucose tolerance) R73.02    Orthostatic hypotension I95.1    SOB (shortness of breath) R06.02    PAF (paroxysmal atrial fibrillation) ([facility]) I48.0    Chest pain R07.9    Pulmonary embolism ([facility]) I26.99    Pulmonary emboli ([facility]) I26.99    AICD at end of battery life Z45.02    Bronchiectasis with acute lower respiratory infection ([facility] [address] J47.0    Ventricular tachyarrhythmia ([facility]) I47.2    Bronchitis J40    GREY (acute kidney injury) ([facility]) N17.9    Severe obesity (BMI 35.0-39. 9) E66.01    Senile osteoporosis M81.0     REVIEW OF SYSTEMS: All Below are Negative except: See HPI   Constitutional: negative for fever, chills, and weight loss. Cardiovascular: negative for chest pain, claudication, leg swelling, SOB, RAMOS   Gastrointestinal: Negative for pain, N/V/C/D, Blood in stool or urine, dysuria, hematuria, incontinence, pelvic pain. Musculoskeletal: See HPI   Neurological: Negative for dizziness and weakness. Negative for headaches, Visual changes, confusion, seizures   Phychiatric/Behavioral: Negative for depression, memory loss, substance abuse. Extremities: Negative for hair changes, rash, or skin lesion changes. Hematologic: Negative for bleeding problems, bruising, pallor or swollen lymph nodes   Peripheral Vascular: No calf pain, no circulation deficits.     Social History     Socioeconomic History    Marital status:      Spouse name: Not on file    Number of children: Not on file    Years of education: Not on file    Highest education level: Not on file   Occupational History    Not on file   Social Needs    Financial resource strain: Not on file    Food insecurity:     Worry: Not on file     Inability: Not on file    Transportation needs:     Medical: Not on file     Non-medical: Not on file   Tobacco Use    Smoking status: Former Smoker     Packs/day: 1.50     Years: 20.00     Pack years: 30.00     Types: Cigarettes     Last attempt to quit: 1988     Years since quittin.9    Smokeless tobacco: Never Used   Substance and Sexual Activity    Alcohol use: No    Drug use: No    Sexual activity: Not on file   Lifestyle    Physical activity:     Days per week: Not on file     Minutes per session: Not on file    Stress: Not on file   Relationships    Social connections:     Talks on phone: Not on file     Gets together: Not on file     Attends Congregational service: Not on file     Active member of club or organization: Not on file     Attends meetings of clubs or organizations: Not on file     Relationship status: Not on file    Intimate partner violence:     Fear of current or ex partner: Not on file     Emotionally abused: Not on file     Physically abused: Not on file     Forced sexual activity: Not on file   Other Topics Concern    Not on file   Social History Narrative    Not on file      Allergies   Allergen Reactions    Lisinopril Rash     Hypertensive crisis    Tetracycline Anaphylaxis, Rash and Swelling    Hibiclens [Chlorhexidine Gluconate] Other (comments)     Turned red and BP drop low.  Other Medication Rash and Swelling     -Myacins      Current Outpatient Medications   Medication Sig    metoprolol tartrate (LOPRESSOR) 100 mg IR tablet TAKE ONE TABLET BY MOUTH TWICE DAILY    dilTIAZem CD (CARDIZEM CD) 240 mg ER capsule Take 1 Cap by mouth daily.  metFORMIN (GLUMETZA ER) 500 mg TG24 24 hour tablet Take  by mouth two (2) times a day.  coenzyme q10 (CO Q-10) 10 mg cap Take  by mouth.     umeclidinium (INCRUSE ELLIPTA) 62.5 mcg/actuation inhaler Take 1 Puff by inhalation daily.  fluticasone (FLONASE) 50 mcg/actuation nasal spray 2 Sprays by Both Nostrils route daily.  albuterol sulfate (PROAIR RESPICLICK) 90 mcg/actuation aepb Take 2 Puffs by inhalation every four (4) hours as needed.  loratadine (CLARITIN) 10 mg tablet Take 10 mg by mouth.  MULTIVIT-MIN/IRON/FOLIC/LUTEIN (CENTRUM SILVER WOMEN PO) Take  by mouth.  ELIQUIS 5 mg tablet Take 1 Tab by mouth two (2) times a day.  escitalopram oxalate (LEXAPRO) 20 mg tablet Take 20 mg by mouth daily.  exemestane (AROMASIN) 25 mg tablet Take 25 mg by mouth daily.  pravastatin (PRAVACHOL) 40 mg tablet Take 40 mg by mouth nightly. No current facility-administered medications for this visit.        PHYSICAL EXAMINATION:  Visit Vitals  /61   Pulse 65   Temp 97.3 °F (36.3 °C) (Oral)   Resp 18   Ht 5' 5\" (1.651 m)   Wt 210 lb 3.2 oz (95.3 kg)   SpO2 96%   BMI 34.98 kg/m²      ORTHO EXAMINATION:  Examination Right knee Left knee   Skin Intact Intact   Range of motion 110-0 120-0   Effusion - -   Medial joint line tenderness + -   Lateral joint line tenderness - -   Popliteal tenderness - -   Osteophytes palpable + -   Rembertos - -   Patella crepitus + -   Anterior drawer - -   Lateral laxity - -   Medial laxity - -   Varus deformity - -   Valgus deformity - -   Pretibial edema - -   Calf tenderness - -       TIME OUT:  Chart reviewed for the following:   IEun MD, have reviewed the History, Physical and updated the Allergic reactions for [address] [place] [address] performed immediately prior to start of procedure:  Stanley Garcia MD, have performed the following reviews on [facility] prior to the start of the procedure:          * Patient was identified by name and date of birth   * Agreement on procedure being performed was verified  * Risks and Benefits explained to the patient  * Procedure site verified and marked as necessary  * Patient was positioned for comfort  * Consent was obtained     Time: 2:13 PM     Date of procedure: 2/13/2020  Procedure performed by:  Oz Wright MD  Ms. Garay tolerated the procedure well with no complications. RADIOGRAPHS:  XR RIGHT KNEE 2/13/20 NILAM  IMPRESSION:  Three views - No fractures, no effusion, severe medial left, moderate lateral left joint space narrowing, + osteophytes present. Kellgren Scot grade 4     IMPRESSION:      ICD-10-CM ICD-9-CM    1. Primary osteoarthritis of right knee M17.11 715.16 PROCEDURE AUTHORIZATION TO       betamethasone (CELESTONE SOLUSPAN) 6 mg/mL injection      BETAMETHASONE ACETATE & SODIUM PHOSPHATE INJECTION 3 MG EA.      DRAIN/INJECT LARGE JOINT/BURSA   2. Chronic pain of right knee M25.561 719.46 AMB POC X-RAY KNEE 3 VIEW    G89.29 338.29 PROCEDURE AUTHORIZATION TO       betamethasone (CELESTONE SOLUSPAN) 6 mg/mL injection      BETAMETHASONE ACETATE & SODIUM PHOSPHATE INJECTION 3 MG EA.      DRAIN/INJECT LARGE JOINT/BURSA   3. Osteoporosis without current pathological fracture, unspecified osteoporosis type M81.0 733.00      PLAN:  After discussing treatment options, patient's right knee was injected with 4 cc Marcaine and 1/2 cc Celestone. Consider visco supplementation if pain continues. We discussed possible need for right knee arthroplasty at some time in the future if pain continues pending weight loss. Dietary counseling provided today. Start weight loss with low carb diet and intermittent fasting. She will follow up as needed.       Scribed by Carmenza Irizarry) as dictated by Oz Wright MD

## 2020-02-13 NOTE — PROGRESS NOTES
Verbal order given by Dr. Nikki Osei to draw up 4 mL 0.25% Sensorcaine and 0.5 mL 30 mg/5mL Betamethasone.

## 2020-02-21 ENCOUNTER — DOCUMENTATION ONLY (OUTPATIENT)
Dept: ORTHOPEDIC SURGERY | Facility: CLINIC | Age: 75
End: 2020-02-21

## 2020-03-31 ENCOUNTER — VIRTUAL VISIT (OUTPATIENT)
Dept: CARDIOLOGY CLINIC | Age: 75
End: 2020-03-31

## 2020-03-31 VITALS — BODY MASS INDEX: 34.98 KG/M2 | HEIGHT: 65 IN

## 2020-03-31 DIAGNOSIS — Z95.810 BIVENTRICULAR ICD (IMPLANTABLE CARDIOVERTER-DEFIBRILLATOR) IN PLACE: ICD-10-CM

## 2020-03-31 DIAGNOSIS — I48.0 PAF (PAROXYSMAL ATRIAL FIBRILLATION) (HCC): ICD-10-CM

## 2020-03-31 DIAGNOSIS — I42.8 OTHER CARDIOMYOPATHY (HCC): Primary | ICD-10-CM

## 2020-03-31 NOTE — PROGRESS NOTES
Wojciech Amino presents today for   Chief Complaint   Patient presents with    Dizziness       Wojciech Shultz preferred language for health care discussion is english/other. Is someone accompanying this pt? no    Is the patient using any DME equipment during [address]? no    Depression Screening:  3 most recent PHQ Screens 2/13/2020   PHQ Not Done Active Diagnosis of Depression or Bipolar Disorder   Little interest or pleasure in doing things Not at all   Feeling down, depressed, irritable, or hopeless Not at all   Total Score PHQ 2 0       Learning Assessment:  Learning Assessment 10/7/2015   PRIMARY LEARNER Patient   PRIMARY LANGUAGE ENGLISH   LEARNER PREFERENCE PRIMARY LISTENING   ANSWERED BY patient   RELATIONSHIP SELF       Abuse Screening:  No flowsheet data found. Fall Risk  Fall Risk Assessment, last 12 mths 2/13/2020   Able to walk? Yes   Fall in past 12 months? Yes   Fall with injury? No   Number of falls in past 12 months 1   Fall Risk Score 1       Pt currently taking Anticoagulant therapy? yes    Coordination of Care:  1. Have you been to the ER, urgent care clinic since your last visit? Hospitalized since your last visit? no    2. Have you seen or consulted any other health care providers outside of the [address] [place] [address] since your last visit? Include any pap smears or colon screening.  no

## 2020-03-31 NOTE — PROGRESS NOTES
Tj Griffinipes  75 y.o. who was seen by synchronous (real-time) audio-video technology on 3/31/2020. Consent:  She and/or her healthcare decision maker is aware that this patient-initiated Telehealth encounter is a billable service, with coverage as determined by her insurance carrier. She is aware that she may receive a bill and has provided verbal consent to proceed: YES    I was in the office while conducting this encounter. HPI:  She has been feeling quite well lately. Other than occasional dizziness as a spinning, she has no complaints. She has had no chest pain, dyspnea, orthopnea, PND. She has not had any palpitation. She has had no syncope. Her dizziness is more of a spinning rather than feeling like passing out. She has had no symptoms to indicate TIA or amaurosis fugax. She has decided not to have knee surgery and her knee pain is somewhat better but still limiting her activities. We went over her medication and she has been taking all of her medications. She has not been checking her blood pressure because of the breakdown of her blood pressure equipment and she will try to get a new machine. She has history of a left bundle branch block, hypertension, dyslipidemia and glucose intolerance. She has a 20 pack a year cigarette smoking history until 1988. She was told that she had a heart murmur and hole in her heart as a child, but this has not been clearly documented by echocardiogram or other diagnostic workup. She was seen by Dr. Froilan Guadalupe in 2003, for the evaluation of left bundle branch block and had negative stress nuclear cardiac imaging. She was told that everything was okay by Dr. Froilan Guadalupe at that time. She had repeat stress nuclear cardiac imaging on September 17, 2007, which demonstrated a moderate, fixed perfusion defect in the basal inferior and mid inferior wall, involving the inferior apex as well, with no significant ischemia.  There was also moderately severe scarring in the anterior wall, anterior apex and anterior septum, with very mild associated ischemia. There was an akinetic interventricular septum and anterior apex, and mild hypokinesis of the proximal anterior wall, with moderate hypokinesis of the entire inferior wall, with overall EF in the 25% range. She subsequently underwent cardiac catheterization on February 27, 2008 which demonstrated patent coronary arteries, but severe LV dysfunction with EF in the 25-30% range. She then went on to have biventricular ICD implantation on May 12, 2008. Her EF has improved to 56% by repeat echocardiogram in 2009. She had a repeat echocardiogram on August 13, 2010, which demonstrated once again improved LV function, with EF in the 55% range. Left atrial volume was normal at 23 ml/m². PA pressure was estimated normal as well.    Because of shortness of breath she had repeat echocardiogram on 4/16/2012 with demonstrated normal LV function with the EF in the 60 to 65% range. There was grade 1 diastolic dysfunction. PA pressure was estimated in the range of 25 to 30 mmHg. There was no significant valvular pathology.    She had an ultrasound examination of the aorta, which demonstrated possible aneurysm; however, the CT scan demonstrated no evidence of aneurysm whatsoever. She does not feel the palpitations much herself; however, her ICD interrogation demonstrated frequent episodes of atrial fibrillation, at times, very prolonged for hours. She had problems with worsening shortness of breath and she developed a cough and fever, and was seen in the emergency room on 05/30/16. She was ultimately diagnosed of DVT and pulmonary emboli by CT angiogram and has been placed on Eliquis.   Her echocardiogram on 07/07/2016 demonstrated normal left ventricular size and lower limit of normal left ventricular systolic function with EF in the 50% range.  There was no significant valvular pathology.  Left atrial dimension was normal with a volume index of 25 mL/meter2. Mally Reyes was no significant pulmonary hypertension.       She developed rapid atrial fibrillation associated with pneuminia and subsequently shocked by her ICD because of the fast heart rate in July 2018.  In the hospital, there was no evidence of decompensated congestive heart failure. She had an echocardiogram on 7/30/18 which demonstrated normal left ventricular systolic function with EF in the 55-60% range. PA pressure was estimated at 40 mmHg. There was no significant valvular pathology. The left atrial dimension was normal with volume index of 31 ml/m sq. The interrogation of the ICD demonstrated once again the episode of left atrial fibrillation and ICD shock on 7/29/18. Her OptiVol was normal indicating normal volume status at that time.       Coding Help - Use CPT Codes 48046-48420, 42359-41708 for Established and New Patients respectively, either employing EM elements or Time rules. Other codes (example consult codes) may also apply. I spent at least minutes 25 with this established patient, and >50% of the time was spent counseling and/or coordinating care regarding current and future cardiac care. 712    Current Outpatient Medications   Medication Sig    Cartia  mg ER capsule Take 1 capsule by mouth once daily    metoprolol tartrate (LOPRESSOR) 100 mg IR tablet TAKE ONE TABLET BY MOUTH TWICE DAILY    pravastatin (PRAVACHOL) 40 mg tablet Take 40 mg by mouth nightly.  metFORMIN (GLUMETZA ER) 500 mg TG24 24 hour tablet Take  by mouth two (2) times a day.  coenzyme q10 (CO Q-10) 10 mg cap Take  by mouth.  umeclidinium (INCRUSE ELLIPTA) 62.5 mcg/actuation inhaler Take 1 Puff by inhalation daily.  fluticasone (FLONASE) 50 mcg/actuation nasal spray 2 Sprays by Both Nostrils route daily.  albuterol sulfate (PROAIR RESPICLICK) 90 mcg/actuation aepb Take 2 Puffs by inhalation every four (4) hours as needed.  loratadine (CLARITIN) 10 mg tablet Take 10 mg by mouth.     MULTIVIT-MIN/IRON/FOLIC/LUTEIN (CENTRUM SILVER WOMEN PO) Take  by mouth.  ELIQUIS 5 mg tablet Take 1 Tab by mouth two (2) times a day.  escitalopram oxalate (LEXAPRO) 20 mg tablet Take 20 mg by mouth daily.  exemestane (AROMASIN) 25 mg tablet Take 25 mg by mouth daily. No current facility-administered medications for this visit. Allergies   Allergen Reactions    Lisinopril Rash     Hypertensive crisis    Tetracycline Anaphylaxis, Rash and Swelling    Hibiclens [Chlorhexidine Gluconate] Other (comments)     Turned red and BP drop low.  Other Medication Rash and Swelling     -Myacins          Review of Systems   Constitutional: Negative for malaise/fatigue and weight loss. HENT: Negative for hearing loss. Eyes: Negative for blurred vision and double vision. Respiratory: Negative for shortness of breath. Cardiovascular: Negative for chest pain, palpitations, orthopnea, claudication, leg swelling and PND. Gastrointestinal: Negative for blood in stool, heartburn and melena. Genitourinary: Negative for dysuria, frequency, hematuria and urgency. Musculoskeletal: Positive for back pain and joint pain. Skin: Negative for itching and rash. Neurological: Positive for dizziness. Negative for loss of consciousness. Psychiatric/Behavioral: Negative for depression and memory loss. Vital Signs: (As obtained by patient/caregiver at home)  There were no vitals taken for this visit. Assessment & Plan:       ICD-10-CM ICD-9-CM    1. Other cardiomyopathy (HCC),EF now back to 55% I42.8 425.4    2. PAF (paroxysmal atrial fibrillation) ([facility]) I48.0 427.31    3. Biventricular ICD (implantable cardioverter-defibrillator) in place Z95.810 V45.02        DISCUSSION:  She has been doing very well from a cardiac standpoint. She has had no symptoms to indicate angina or cardiac decompensation. Symptomatically it does not appear that she has had recurrent atrial fibrillation. However, I would continue on anticoagulation unless she is atrial fibrillation-free for at least two years. For now, she will be continued on current regimen and her future cardiac follow up will be continued with Dr. Pamela Garcia starting in June 2020 upon my [facility]. We discussed the expected course, resolution and complications of the diagnosis(es) in detail. Medication risks, benefits, costs, interactions, and alternatives were discussed as indicated. I advised her to contact the office if her condition worsens, changes or fails to improve as anticipated. She expressed understanding with the diagnosis(es) and plan. Pursuant to the emergency declaration under the [facility], [facility] waiver authority and the [employer] Act, this Virtual  Visit was conducted, with patient's consent, to reduce the patient's risk of exposure to COVID-19 and provide continuity of care for an established patient. Services were provided through a video synchronous discussion virtually to substitute for in-person clinic visit.     Jayy Joyce MD

## 2020-06-26 DIAGNOSIS — I10 HYPERTENSION, UNSPECIFIED TYPE: Primary | ICD-10-CM

## 2020-06-26 RX ORDER — METOPROLOL TARTRATE 100 MG/1
TABLET ORAL
Qty: 180 TAB | Refills: 3 | Status: SHIPPED | OUTPATIENT
Start: 2020-06-26 | End: 2020-09-25 | Stop reason: SDUPTHER

## 2020-09-15 ENCOUNTER — OFFICE VISIT (OUTPATIENT)
Dept: PULMONOLOGY | Age: 75
End: 2020-09-15

## 2020-09-15 VITALS
SYSTOLIC BLOOD PRESSURE: 120 MMHG | HEART RATE: 74 BPM | RESPIRATION RATE: 20 BRPM | TEMPERATURE: 98.2 F | BODY MASS INDEX: 34.66 KG/M2 | DIASTOLIC BLOOD PRESSURE: 70 MMHG | OXYGEN SATURATION: 96 % | WEIGHT: 208 LBS | HEIGHT: 65 IN

## 2020-09-15 DIAGNOSIS — J47.0 BRONCHIECTASIS WITH ACUTE LOWER RESPIRATORY INFECTION (HCC): Primary | ICD-10-CM

## 2020-09-15 DIAGNOSIS — I48.0 PAF (PAROXYSMAL ATRIAL FIBRILLATION) (HCC): ICD-10-CM

## 2020-09-15 DIAGNOSIS — I26.94 MULTIPLE SUBSEGMENTAL PULMONARY EMBOLI WITHOUT ACUTE COR PULMONALE (HCC): ICD-10-CM

## 2020-09-15 NOTE — PATIENT INSTRUCTIONS
Bronchiectasis: Care Instructions Your Care Instructions Bronchiectasis (say \"asgzy-fsl-IHX-tuh-heraclio\") is a lung problem in which the breathing tubes are stretched and become larger. The buildup of mucus causes the stretching and can lead to swelling and infections. You may find it harder to breathe and cough up mucus out of your lungs. Some people are born with it. Other people get it because of another health problem, usually cystic fibrosis or a lung infection such as pneumonia or tuberculosis. Symptoms are often different for everyone. But a cough that brings up mucus, or sputum, is common. The condition is usually treated with antibiotics, medicines to relax the airways (bronchodilators), and medicines to make it easier to cough up mucus (expectorants). Follow-up care is a key part of your treatment and safety. Be sure to make and go to all appointments, and call your doctor if you are having problems. It's also a good idea to know your test results and keep a list of the medicines you take. How can you care for yourself at home? · Take your antibiotics as directed. Do not stop taking them just because you feel better. You need to take the full course of antibiotics. · Take your medicines exactly as prescribed. Call your doctor if you think you are having a problem with your medicine. · If you have cystic fibrosis, follow your treatment plan. · If you or your child has bronchiectasis, follow directions from your doctor or respiratory therapist for moving your or your child's body into different positions to help drain fluid. This is called postural drainage, and it helps to ease breathing and prevent infections. · You also may do chest percussion on your child. This is strong clapping of the chest with a cupped hand to vibrate the airways in the lungs. The vibration helps your child cough up mucus. You may see a respiratory therapist to learn how to do chest percussion. · Use an airway clearance device, such as a flutter valve, as directed to help remove mucus from the lungs. · Avoid lung infections. ? Get shots to protect against the flu and pneumococcal disease. ? Wash your hands frequently. ? Avoid close contact with people who have colds or the flu. ? Do not smoke or allow others to smoke around you. If you need help quitting, talk to your doctor about stop-smoking programs and medicines. These can increase your chances of quitting for good. ? Stay inside, if you can, on days when the pollution level is high. When should you call for help? Call 911 anytime you think you may need emergency care. For example, call if: 
  · You have severe trouble breathing.  
  · You cough up more than 1 cup of blood. Call your doctor now or seek immediate medical care if: 
  · You have chest pain.  
  · You have shortness of breath.  
  · You have a fever.  
  · Your mucus (sputum) is bloody or changes color. Watch closely for changes in your health, and be sure to contact your doctor if: 
  · You are coughing up more sputum than before.  
  · Your symptoms get worse or do not get better with treatment. Where can you learn more? Go to http://www.gray.com/ Enter C667 in the search box to learn more about \"Bronchiectasis: Care Instructions. \" Current as of: February 24, 2020               Content Version: 12.6 © 7670-9292 [employer]. Care instructions adapted under license by [employer] (which disclaims liability or warranty for this information). If you have questions about a medical condition or this instruction, always ask your healthcare professional. [place] [address] any warranty or liability for your use of this information.

## 2020-09-15 NOTE — LETTER
9/15/20 Patient: Mary Brantley YOB: 1945 Date of Visit: 9/15/2020 Adriana Roth MD 
[facility] [address] [facility] [address] VIA Facsimile: [phone] Dear Adriana Roth MD, Thank you for referring Ms. Tc Oviedo to [address] [place] [address] for evaluation. My notes for this consultation are attached. If you have questions, please do not hesitate to call me. I look forward to following your patient along with you.  
 
 
Sincerely, 
 
Angela Anthony MD

## 2020-09-15 NOTE — PROGRESS NOTES
Progress Note:Follow up Visit    09/15/20   Patient was last seen in September 2018. She states that since then she has been doing relatively well but recently over the past few months she has noticed a daily occurrence of cough productive of green mucus with brown stringy change especially in the morning. After she coughs and clears she feels better. She also mentions that this is the first winter when she did not get a pneumonia. She has been following up with her primary care physician Dr. Beau Harris. Has intermittently gotten antibiotics but not frequently   she denies any fever or chills. Denies any night sweats  Denies weight loss  Denies any shortness of breath other than her baseline-she is trying to stay active taking care of her  who has dementia. Also her son and daughter-in-law have moved in with them-she has 4 dogs in the house. She has not used any inhalers. Has not had any ER visits. Has been following up with cardiology and is now on Fayetta Blain and continues to remain on Eliquis  Last imaging studies were in July 2018  Last PFT in April 2017  No interim PFTs     HPI:   Ms. Truong Mendes who carries history of COPD, recurrent bronchitis requiring visit to Urgent care, Diagnosis of PE on NOAC , Cardiomyopathy S/P pacemaker and defibrillator, and history of left breast cancer is evaluated for COPD/Chronic bronchitis work up. Last CT performed in MAY 2016 demonstrated PE, nonspecific mediastinal adenopathy, minimal atelectasis/small effusion, and hyperinflation. We have obtained follow up CT chest performed on 09/12/16 did not reveal any finding concerning PE or Mediastinal adenopathy. . During this interval time patient has not noticed any symptoms concerning COPD exacerbation. Her  RAMOS remains stable. 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 Subsequent follow ups- never has had TB.     Allergies   Allergen Reactions    Lisinopril Rash     Hypertensive crisis    Tetracycline Anaphylaxis, Rash and Swelling    Hibiclens [Chlorhexidine Gluconate] Other (comments)     Turned red and BP drop low.  Other Medication Rash and Swelling     -Myacins     Current Outpatient Medications   Medication Sig Dispense Refill    metoprolol tartrate (LOPRESSOR) 100 mg IR tablet Take 1 tablet by mouth twice daily 180 Tab 3    Cartia  mg ER capsule Take 1 capsule by mouth once daily 30 Cap 11    pravastatin (PRAVACHOL) 40 mg tablet Take 40 mg by mouth nightly.  metFORMIN (GLUMETZA ER) 500 mg TG24 24 hour tablet Take 500 mg by mouth daily.  coenzyme q10 (CO Q-10) 10 mg cap Take  by mouth.  albuterol sulfate (PROAIR RESPICLICK) 90 mcg/actuation aepb Take 2 Puffs by inhalation every four (4) hours as needed. 1 Inhaler 3    loratadine (CLARITIN) 10 mg tablet Take 10 mg by mouth.  MULTIVIT-MIN/IRON/FOLIC/LUTEIN (CENTRUM SILVER WOMEN PO) Take  by mouth.  ELIQUIS 5 mg tablet Take 1 Tab by mouth two (2) times a day. 120 Tab 3    escitalopram oxalate (LEXAPRO) 20 mg tablet Take 20 mg by mouth daily.  umeclidinium (INCRUSE ELLIPTA) 62.5 mcg/actuation inhaler Take 1 Puff by inhalation daily. 1 Inhaler 6    fluticasone (FLONASE) 50 mcg/actuation nasal spray 2 Sprays by Both Nostrils route daily. 1 Bottle 0    exemestane (AROMASIN) 25 mg tablet Take 25 mg by mouth daily. Review of Systems   Constitutional: Negative. HENT: Negative. Eyes: Negative. Respiratory: cough, SOB  Cardiovascular: Negative. Gastrointestinal: Negative. Genitourinary: Negative. Musculoskeletal: Negative. Skin: Negative. Neurological: Negative. Endo/Heme/Allergies: Negative. Psychiatric/Behavioral: depression      Blood pressure 120/70, pulse 74, temperature 98.2 °F (36.8 °C), resp. rate 20, height 5' 5\" (1.651 m), weight 94.3 kg (208 lb), SpO2 96 %.     Physical Exam   Constitutional: She is oriented to person, place, and time and well-developed, well-nourished, and in no distress. HENT:   Head: Normocephalic and atraumatic. Eyes: EOM are normal. Pupils are equal, round, and reactive to light. Neck: Normal range of motion. Neck supple. No thyromegaly present. Chest-kyphotic curvature  Cardiovascular: Normal rate and regular rhythm. Pulmonary/Chest: Effort normal. She has no wheezes. She has no rales. Abdominal: Soft. Bowel sounds are normal.   Musculoskeletal: Normal range of motion. She exhibits no edema. Neurological: She is alert and oriented to person, place, and time. Skin: Skin is warm and dry. Psychiatric: Affect and judgment normal.       Investigation:    Results from Hospital Encounter encounter on 07/29/18   CT CHEST WO CONT    Narrative CT chest without contrast     HISTORY: Persistent cough    COMPARISON: CT 9/12/16    TECHNIQUE: Helical scans through the chest is obtained from the thoracic inlet  to the diaphragm without IV contrast administration. All CT scans at this facility performed using dose optimization techniques as  appreciated to a performed exam, to include automated exposure control,  adjustment of the mA and or KU according to patient size (including appropriate  matching for site specific examination), or use of iterative reconstruction  technique. FINDINGS: The study is suboptimal due to lack of IV contrast.  Subtle  abnormality can be under detected. Lung Parenchyma: There is a small wedge-shaped pleural-based consolidation at  lateral left lingular lobe. This shows interval enlargement as more likely  enlarged atelectasis by appearance. The lungs are otherwise clear. No acute  infiltration or focal pulmonary finding. Thyroid: Unremarkable. Mediastinum: No adenopathy. Pleural Space And Chest Wall: No significant pleural pathology.   There is  chronic cystic lesion identified in the medial aspect of left breast abutting  the pectoralis muscle with slightly decreased in size compared to the prior CT  which was 2.7 x 4.0 cm and now 3.1 x 3.7 cm. Small surgical staples again noted  along the posterior wall of the lesion. The superior left chest wall pacemaker  again noted. Heart: The heart and the pericardium appear normal.    Vasculature: The aorta and the great vessels are unremarkable. Imaged Upper Abdomen: Unremarkable. Osseous Structures: Unremarkable for age. Impression IMPRESSION:     1. Small wedge-shaped consolidation at lateral left lingular lobe which is  slightly enlarged but favorable for atelectasis. 2. No other significant pulmonary finding. 3. Chronic cystic lesion at posterior medial left breast is slightly decreased  in size, favorable for chronic postop seroma. Thank you for your referral.         CT chest (05/10/16): There are filling defects in the upper, middle, and lower lobe branches of the right pulmonary artery. No evidence of embolus in the left pulmonary artery branches or in the central trunks. The main pulmonary artery measures 3.3 cm in diameter. The thoracic aorta is not aneurysmal.  No evidence for dissection.      Lymph nodes: Mediastinal lymph nodes are mildly prominent. A right paratracheal lymph node measures 1.2 x 0.9 cm. Previously, this lymph node measured 1.8 x 0.4 cm. There is no evidence of hilar lymphadenopathy.      Thyroid: Normal in size without mass in the visualized parenchyma. .      Mediastinum: There is a pacemaker in the left chest wall. A fluid collection in the inferior left breast measures about 3.3 x 4.8 cm (previously, 2.8 x 4.4 cm). There is no evidence of peripheral enhancement. 2 adjacent biopsy clips are redemonstrated. There is a small hiatal hernia.      Lungs:    The lungs are diffusely hyperinflated with mild tracheobronchomalacia. Right Lung: There is a small posterior effusion with overlying atelectasis. No confluent infiltrate to suggest infarct.      Left Lung:  No evidence of infiltrate.  There is a small focus of atelectasis in the posterior lingula. This is similar to previous exam. No pleural effusion.      Abdomen: There is diffuse fatty atrophy of the pancreas. Visualized portions of the liver, spleen, pancreas, adrenal glands, and kidneys are unremarkable. The gallbladder is normal.      Bones: A fracture deformity in the left anterior fifth rib is unchanged. Degenerative changes of the spine are stable. 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 There are no destructive lesions.      IMPRESSION:      1.  Multiple right pulmonary emboli. No evidence of left pulmonary emboli or pulmonary infarcts. .   2.  Small right pleural effusion. 3. Prominence of the main portal artery is suggestive of pulmonary artery hypertension. 4. COPD and mild tracheobronchomalacia. 5. Similar size of seroma in the left chest wall.    .          PFT(2008): mild obstructive changes   DATE 4/19/2017  Pre bronchodilator ( 2008) Post Bronchodilator (2008)   FVC 2. 11( 69)  2.12(67) 2.13(67)   FEV1 1.60( 69)  1.69(73) 1.64(71)   FEV1/FVC 76  79(73) 76   TLC 4.04(78)  4.32(84)     RV 2.14(79)  2.12(107)     RV/TLC   49(38)     DLCO 13.76( 61)  12.47(66)        PFT 4/19/17:  Mild to moderate restrictive impairment with reduced DLCO. Assessment:    Bronchiectasis: Recurrent bronchitis/COPD exacerbation with Mild-Moderate Restrictive impairment ? sequelae of prior inflammatory process. Recurrant bronchitis- lower respiratory tract infections treated with antibiotics. Elevated IgE raises ? Allergic triggers and possible role for additional therapy. Current symptoms are more consistent with chronic persistent bronchitis and will need further evaluation for microbiologic garret to guide appropriate therapy with antibiotics. History of COPD - exertional hypoxemia in past has now improved.   PFTs with predominant restrictive ventilatory impairment likely from combination of bronchiectasis and kyphosis  Nonspecific Mediastinal adenopathy- S/P follow up CT chest demonstrated interval resolution  9/2016 CT. PE on NOAC ,5/206. Resolved on follow up CT in 9/2016. Atrial fibrillation- on anticoagulation, cardizem for rate control  H/o breast biopsy- 2010. H/o radiation           Plan:  Ordering sputum for Gram stain and culture prior to starting antibiotics. We will also obtain HRCT for follow-up  Action plan for acute exacerbations discussed  If continues to remain symptomatic and fails to improve will obtain pulmonary function test and consider adjusting bronchodilator therapy  Continue Albuterol two puffs, every six hours as needed -predominantly for mucociliary clearance  Continue ELIQUIS as prescribed . Cardiology following for A. Fib, AICD  Encouraged active lifestyle and endurance activities. Preventive vaccinations-Will need influenza vaccine  RTC in 3  months time and sooner if needed.   Discussed at length current condition, pathophysiology, care plan including testing and treatment options with patient    Chantale Arvizu  Pulmonary and Critical Care Medicine

## 2020-09-25 ENCOUNTER — OFFICE VISIT (OUTPATIENT)
Dept: CARDIOLOGY CLINIC | Age: 75
End: 2020-09-25
Payer: MEDICARE

## 2020-09-25 VITALS
HEART RATE: 67 BPM | OXYGEN SATURATION: 95 % | WEIGHT: 208 LBS | BODY MASS INDEX: 34.66 KG/M2 | SYSTOLIC BLOOD PRESSURE: 100 MMHG | DIASTOLIC BLOOD PRESSURE: 60 MMHG | HEIGHT: 65 IN

## 2020-09-25 DIAGNOSIS — I10 HYPERTENSION, UNSPECIFIED TYPE: ICD-10-CM

## 2020-09-25 PROCEDURE — 99214 OFFICE O/P EST MOD 30 MIN: CPT | Performed by: INTERNAL MEDICINE

## 2020-09-25 PROCEDURE — G8754 DIAS BP LESS 90: HCPCS | Performed by: INTERNAL MEDICINE

## 2020-09-25 PROCEDURE — G8510 SCR DEP NEG, NO PLAN REQD: HCPCS | Performed by: INTERNAL MEDICINE

## 2020-09-25 PROCEDURE — G8417 CALC BMI ABV UP PARAM F/U: HCPCS | Performed by: INTERNAL MEDICINE

## 2020-09-25 PROCEDURE — 1090F PRES/ABSN URINE INCON ASSESS: CPT | Performed by: INTERNAL MEDICINE

## 2020-09-25 PROCEDURE — G8536 NO DOC ELDER MAL SCRN: HCPCS | Performed by: INTERNAL MEDICINE

## 2020-09-25 PROCEDURE — 3017F COLORECTAL CA SCREEN DOC REV: CPT | Performed by: INTERNAL MEDICINE

## 2020-09-25 PROCEDURE — 3288F FALL RISK ASSESSMENT DOCD: CPT | Performed by: INTERNAL MEDICINE

## 2020-09-25 PROCEDURE — 1100F PTFALLS ASSESS-DOCD GE2>/YR: CPT | Performed by: INTERNAL MEDICINE

## 2020-09-25 PROCEDURE — G8752 SYS BP LESS 140: HCPCS | Performed by: INTERNAL MEDICINE

## 2020-09-25 PROCEDURE — G8427 DOCREV CUR MEDS BY ELIG CLIN: HCPCS | Performed by: INTERNAL MEDICINE

## 2020-09-25 RX ORDER — DILTIAZEM HYDROCHLORIDE 240 MG/1
CAPSULE, COATED, EXTENDED RELEASE ORAL
Qty: 90 CAP | Refills: 3 | Status: SHIPPED | OUTPATIENT
Start: 2020-09-25 | End: 2021-03-24 | Stop reason: SDUPTHER

## 2020-09-25 RX ORDER — METOPROLOL TARTRATE 100 MG/1
TABLET ORAL
Qty: 180 TAB | Refills: 3 | Status: SHIPPED | OUTPATIENT
Start: 2020-09-25 | End: 2021-03-24 | Stop reason: SDUPTHER

## 2020-09-25 NOTE — PROGRESS NOTES
Jada Constantino    Chief Complaint   Patient presents with    Follow-up     6month follow up - no cardiac complaints        HPI    Jada Constantino is a 75 y.o. with h/o cardiomyopathy, ICD, pAFib here for routine follow up care. She feels fine from a CV standpoint. The interrogation of her ICD demonstrated normal OptiVol and no tachyarrhythmia. There is no atrial fibrillation. She does have a great deal of knee pain and contemplating knee surgery in the future.      She has history of a left bundle branch block, hypertension, dyslipidemia and glucose intolerance. She has a 20 pack a year cigarette smoking history until 1988. She was told that she had a heart murmur and hole in her heart as a child, but this has not been clearly documented by echocardiogram or other diagnostic workup. She was seen by Dr. LISA COELLO [facility] in 2003, for the evaluation of left bundle branch block and had negative stress nuclear cardiac imaging. She was told that everything was okay by Dr. [facility] at that time. She had repeat stress nuclear cardiac imaging on September 17, 2007, which demonstrated a moderate, fixed perfusion defect in the basal inferior and mid inferior wall, involving the inferior apex as well, with no significant ischemia. There was also moderately severe scarring in the anterior wall, anterior apex and anterior septum, with very mild associated ischemia. There was an akinetic interventricular septum and anterior apex, and mild hypokinesis of the proximal anterior wall, with moderate hypokinesis of the entire inferior wall, with overall EF in the 25% range. She subsequently underwent cardiac catheterization on February 27, 2008 which demonstrated patent coronary arteries, but severe LV dysfunction with EF in the 25-30% range. She then went on to have biventricular ICD implantation on May 12, 2008. Her EF has improved to 56% by repeat echocardiogram in 2009.  She had a repeat echocardiogram on August 13, 2010, which demonstrated once again improved LV function, with EF in the 55% range. Left atrial volume was normal at 23 ml/m². PA pressure was estimated normal as well.    Because of shortness of breath she had repeat echocardiogram on 4/16/2012 with demonstrated normal LV function with the EF in the 60 to 65% range. There was grade 1 diastolic dysfunction. PA pressure was estimated in the range of 25 to 30 mmHg. There was no significant valvular pathology.    She had an ultrasound examination of the aorta, which demonstrated possible aneurysm; however, the CT scan demonstrated no evidence of aneurysm whatsoever. She does not feel the palpitations much herself; however, her ICD interrogation demonstrated frequent episodes of atrial fibrillation, at times, very prolonged for hours. She had problems with worsening shortness of breath and she developed a cough and fever, and was seen in the emergency room on 05/30/16. She was ultimately diagnosed of DVT and pulmonary emboli by CT angiogram and has been placed on Eliquis. Her echocardiogram on 07/07/2016 demonstrated normal left ventricular size and lower limit of normal left ventricular systolic function with EF in the 50% range.  There was no significant valvular pathology.  Left atrial dimension was normal with a volume index of 25 mL/meter2.  There was no significant pulmonary hypertension.       She developed rapid atrial fibrillation associated with pneuminia and subsequently shocked by her ICD because of the fast heart rate in July 2018.  In the hospital, there was no evidence of decompensated congestive heart failure. She had an echocardiogram on 7/30/18 which demonstrated normal left ventricular systolic function with EF in the 55-60% range. PA pressure was estimated at 40 mmHg. There was no significant valvular pathology. The left atrial dimension was normal with volume index of 31 ml/m sq.  The interrogation of the ICD demonstrated once again the episode of left atrial fibrillation and ICD shock on 7/29/18. Her OptiVol was normal indicating normal volume status at that time. Past Medical History:   Diagnosis Date    CAD (coronary artery disease)     cardiomyopathy,CHF,,Cardiac Cath, pacemaker/defib.  Cancer [facility]) 2013    breast    Cardiac catheterization 02/27/2008    Patent coronary arteries. LVEDP 13 mmHg. EF 25-30%. Global hypk.  Cardiac echocardiogram 07/07/2016    EF 50% (prev 60-65% on study of 4/16/12). No WMA. Gr 1 DDfx. Normal RVSP.  Cardiac nuclear imaging test 05/31/2013    No evidence of ongoing ischemia or prior infarction. EF 60%. No RWMA. Mild dyssynchrony of inferior base & mid/distal septum likely c/w pacemaker. Nondiagnostic EKG on pharm stress test due to V-pacing.  Cardiovascular abdominal aorta duplex 09/23/2015    Fusiform AAA in prox & mid portions of abdom Ao.     Chronic lung disease     COPD (chronic obstructive pulmonary disease) (HCC) chronic bronchitis    Coronary artery disease Feb. 2008    Possible CAD with nonischemic dilated cardiomyopathy/EF 25%/ improved EF up to 56% by echocardiogram.    Coronary artery disease     Diabetes mellitus ([address]     GERD (gastroesophageal reflux disease)     Heart failure (HCC)     Heart murmur     Hemangioma of liver     Hx of cardiomyopathy ([address]     Hypercholesterolemia     Hypertension     Left bundle branch block     Phlebitis     PVD (peripheral vascular disease) ([address]     Renal calculi 1999    Thromboembolus ([address]     below knee in R leg       Past Surgical History:   Procedure Laterality Date    COLONOSCOPY N/A 3/7/2017    COLONOSCOPY, SURVEILLANCE with polypectomy performed by Rodolfo Sandra MD at [address] [place] [address] HX BREAST LUMPECTOMY Left 8/2013    cancerous lesion    HX CATARACT REMOVAL Bilateral 1980s    w/ lens implants    HX CATARACT REMOVAL Left     re vised due to implant repositioning    HX HEART CATHETERIZATION  02/27/08    Patent coronary arteries, but severe LV dysfunction w/ EF in the 25-30% range.  HX KNEE ARTHROSCOPY Right     HX MASTECTOMY Left 9/24/2014    LEFT PARTIAL MASTECTOMY WITH NEEDLE LOCALIZATION MAMMOGRAM TIMES TWO performed by Vanna Webb MD at [address] HX PACEMAKER  May 2008    Biventricular ICD implantation     HX PACEMAKER  2012    replacement - Medtronic    HX PACEMAKER PLACEMENT      HX PELVIC LAPAROSCOPY      diagnostic - varicosities       Current Outpatient Medications   Medication Sig Dispense Refill    metoprolol tartrate (LOPRESSOR) 100 mg IR tablet Take 1 tablet by mouth twice daily 180 Tab 3    dilTIAZem ER (Cartia XT) 240 mg capsule Take 1 capsule by mouth once daily 90 Cap 3    pravastatin (PRAVACHOL) 80 mg tablet Take 80 mg by mouth nightly.  metFORMIN (GLUMETZA ER) 500 mg TG24 24 hour tablet Take 500 mg by mouth daily.  coenzyme q10 (CO Q-10) 10 mg cap Take  by mouth.  umeclidinium (INCRUSE ELLIPTA) 62.5 mcg/actuation inhaler Take 1 Puff by inhalation daily. 1 Inhaler 6    fluticasone (FLONASE) 50 mcg/actuation nasal spray 2 Sprays by Both Nostrils route daily. 1 Bottle 0    albuterol sulfate (PROAIR RESPICLICK) 90 mcg/actuation aepb Take 2 Puffs by inhalation every four (4) hours as needed. 1 Inhaler 3    loratadine (CLARITIN) 10 mg tablet Take 10 mg by mouth.  MULTIVIT-MIN/IRON/FOLIC/LUTEIN (CENTRUM SILVER WOMEN PO) Take  by mouth.  ELIQUIS 5 mg tablet Take 1 Tab by mouth two (2) times a day. 120 Tab 3    escitalopram oxalate (LEXAPRO) 20 mg tablet Take 20 mg by mouth daily.  exemestane (AROMASIN) 25 mg tablet Take 25 mg by mouth daily. Allergies   Allergen Reactions    Lisinopril Rash     Hypertensive crisis    Tetracycline Anaphylaxis, Rash and Swelling    Hibiclens [Chlorhexidine Gluconate] Other (comments)     Turned red and BP drop low.     Other Medication Rash and Swelling     -Myacins       Social History     Socioeconomic History    Marital status:      Spouse name: Not on file    Number of children: Not on file    Years of education: Not on file    Highest education level: Not on file   Occupational History    Not on file   Social Needs    Financial resource strain: Not on file    Food insecurity     Worry: Not on file     Inability: Not on file    Transportation needs     Medical: Not on file     Non-medical: Not on file   Tobacco Use    Smoking status: Former Smoker     Packs/day: 1.50     Years: 20.00     Pack years: 30.00     Types: Cigarettes     Start date: 4/15/1963     Last attempt to quit: 1988     Years since quittin.6    Smokeless tobacco: Never Used   Substance and Sexual Activity    Alcohol use: No    Drug use: No    Sexual activity: Not on file   Lifestyle    Physical activity     Days per week: Not on file     Minutes per session: Not on file    Stress: Not on file   Relationships    Social connections     Talks on phone: Not on file     Gets together: Not on file     Attends Spiritism service: Not on file     Active member of club or organization: Not on file     Attends meetings of clubs or organizations: Not on file     Relationship status: Not on file    Intimate partner violence     Fear of current or ex partner: Not on file     Emotionally abused: Not on file     Physically abused: Not on file     Forced sexual activity: Not on file   Other Topics Concern    Not on file   Social History Narrative    Not on file    retired [address],  is also my pt, long time [employer] pts    Review of Systems    14 pt Review of Systems is negative unless otherwise mentioned in the HPI.     Wt Readings from Last 3 Encounters:   20 94.3 kg (208 lb)   09/15/20 94.3 kg (208 lb)   20 95.3 kg (210 lb 3.2 oz)     Temp Readings from Last 3 Encounters:   09/15/20 98.2 °F (36.8 °C)   20 97.3 °F (36.3 °C) (Oral)   19 97.8 °F (36.6 °C)     BP Readings from Last 3 Encounters:   09/15/20 120/70   20 112/61   12/02/19 157/87     Pulse Readings from Last 3 Encounters:   09/25/20 67   09/15/20 74   02/13/20 65       07/29/18   ECHO ADULT COMPLETE 07/30/2018 7/30/2018    Narrative · Technically difficult study with poor endocardial visualization. Definity contrast was given to enhance imaging. · Estimated left ventricular ejection fraction is 56 - 60%. Left   ventricular mild concentric hypertrophy observed. Normal left ventricular   wall motion, no regional wall motion abnormality noted. Age-appropriate   left ventricular diastolic function. · Mild tricuspid valve regurgitation is present. Pulmonary arterial   systolic pressure is 40 mmHg. Mild pulmonary hypertension is present. · Pacer/ICD present in the right ventricle. · Mitral annular calcification. Signed by: Trice Hammer MD       Physical Exam:    Visit Vitals  Pulse 67   Ht 5' 5\" (1.651 m)   Wt 94.3 kg (208 lb)   SpO2 95%   BMI 34.61 kg/m²      Physical Exam  HENT:      Head: Normocephalic and atraumatic. Eyes:      Pupils: Pupils are equal, round, and reactive to light. Cardiovascular:      Rate and Rhythm: Normal rate and regular rhythm. Heart sounds: Normal heart sounds. No murmur. No friction rub. No gallop. Pulmonary:      Effort: Pulmonary effort is normal. No respiratory distress. Breath sounds: Normal breath sounds. No wheezing or rales. Chest:      Chest wall: No tenderness. Abdominal:      General: Bowel sounds are normal.      Palpations: Abdomen is soft. Musculoskeletal:         General: No tenderness. Skin:     General: Skin is warm and dry. Neurological:      Mental Status: She is alert and oriented to person, place, and time.    Psychiatric:      Comments: Appropriate, but pressured tangential speech, can be very \"picky\" opinionated with some splitting behavior exhibited       Device check    Impression and Plan:  Sarah Rodriguez is a 75 y.o. with:    1.) Cardiomyopathy, more recently back to normal 55%  2.) pAFib, with h/o inapprop ICD shocks, known  3.) ICD, known  4.) DVT/ PE    1.) Doing well on med tx, no changes  2.) RTC 6 months    >60 mins spent, reviewing her hx since prior to 2004  Doing well from CV standpoint, no changes made  Was skeptical about changing Drs and knew \"who she didn't want\" those who have tried putting her on meds she refused etc... Thank you for allowing me to participate in the care of your patient, please do not hesitate to call with questions or concerns.     Kindest Regards,    Deanna Salas, DO

## 2020-09-25 NOTE — PROGRESS NOTES
Padmini Milligan presents today for   Chief Complaint   Patient presents with    Follow-up     6month follow up - no cardiac complaints        Padmini Milligan preferred language for health care discussion is english/other. Is someone accompanying this pt? no    Is the patient using any DME equipment during [address]? no    Depression Screening:  3 most recent PHQ Screens 9/25/2020   PHQ Not Done -   Little interest or pleasure in doing things Not at all   Feeling down, depressed, irritable, or hopeless Not at all   Total Score PHQ 2 0       Learning Assessment:  Learning Assessment 10/7/2015   PRIMARY LEARNER Patient   PRIMARY LANGUAGE ENGLISH   LEARNER PREFERENCE PRIMARY LISTENING   ANSWERED BY patient   RELATIONSHIP SELF       Abuse Screening:  Abuse Screening Questionnaire 9/25/2020   Do you ever feel afraid of your partner? N   Are you in a relationship with someone who physically or mentally threatens you? N   Is it safe for you to go home? Y       Fall Risk  Fall Risk Assessment, last 12 mths 9/25/2020   Able to walk? Yes   Fall in past 12 months? Yes   Fall with injury? Yes   Number of falls in past 12 months 1   Fall Risk Score 2       Pt currently taking Anticoagulant therapy? Eliquis    Coordination of Care:  1. Have you been to the ER, urgent care clinic since your last visit? Hospitalized since your last visit? no    2. Have you seen or consulted any other health care providers outside of the [address] [place] [address] since your last visit? Include any pap smears or colon screening.  no

## 2020-10-01 ENCOUNTER — HOSPITAL ENCOUNTER (OUTPATIENT)
Dept: LAB | Age: 75
Discharge: HOME | End: 2020-10-01
Payer: MEDICARE

## 2020-10-01 ENCOUNTER — HOSPITAL ENCOUNTER (OUTPATIENT)
Dept: CT IMAGING | Age: 75
Discharge: HOME | End: 2020-10-01
Attending: INTERNAL MEDICINE
Payer: MEDICARE

## 2020-10-01 DIAGNOSIS — J47.0 BRONCHIECTASIS WITH ACUTE LOWER RESPIRATORY INFECTION (HCC): ICD-10-CM

## 2020-10-01 PROCEDURE — 71250 CT THORAX DX C-: CPT

## 2020-10-01 PROCEDURE — 87070 CULTURE OTHR SPECIMN AEROBIC: CPT

## 2020-10-01 PROCEDURE — 36415 COLL VENOUS BLD VENIPUNCTURE: CPT

## 2020-10-03 LAB
BACTERIA SPEC CULT: NORMAL
GRAM STN SPEC: NORMAL
SERVICE CMNT-IMP: NORMAL

## 2020-11-16 ENCOUNTER — TRANSCRIBE ORDER (OUTPATIENT)
Dept: SCHEDULING | Age: 75
End: 2020-11-16

## 2020-11-16 DIAGNOSIS — Z78.0 MENOPAUSE: Primary | ICD-10-CM

## 2020-11-16 DIAGNOSIS — Z12.31 VISIT FOR SCREENING MAMMOGRAM: Primary | ICD-10-CM

## 2020-11-16 DIAGNOSIS — Z13.820 SPECIAL SCREENING FOR OSTEOPOROSIS: ICD-10-CM

## 2021-01-04 ENCOUNTER — OFFICE VISIT (OUTPATIENT)
Dept: PULMONOLOGY | Age: 76
End: 2021-01-04
Payer: MEDICARE

## 2021-01-04 VITALS
BODY MASS INDEX: 33.84 KG/M2 | WEIGHT: 203.1 LBS | OXYGEN SATURATION: 96 % | HEART RATE: 66 BPM | TEMPERATURE: 98.1 F | RESPIRATION RATE: 18 BRPM | SYSTOLIC BLOOD PRESSURE: 135 MMHG | DIASTOLIC BLOOD PRESSURE: 83 MMHG | HEIGHT: 65 IN

## 2021-01-04 DIAGNOSIS — I48.0 PAF (PAROXYSMAL ATRIAL FIBRILLATION) (HCC): ICD-10-CM

## 2021-01-04 DIAGNOSIS — R91.1 INCIDENTAL LUNG NODULE: ICD-10-CM

## 2021-01-04 DIAGNOSIS — J47.0 BRONCHIECTASIS WITH ACUTE LOWER RESPIRATORY INFECTION (HCC): Primary | ICD-10-CM

## 2021-01-04 DIAGNOSIS — J40 BRONCHITIS: ICD-10-CM

## 2021-01-04 PROCEDURE — G8752 SYS BP LESS 140: HCPCS | Performed by: INTERNAL MEDICINE

## 2021-01-04 PROCEDURE — G8417 CALC BMI ABV UP PARAM F/U: HCPCS | Performed by: INTERNAL MEDICINE

## 2021-01-04 PROCEDURE — G8427 DOCREV CUR MEDS BY ELIG CLIN: HCPCS | Performed by: INTERNAL MEDICINE

## 2021-01-04 PROCEDURE — G8432 DEP SCR NOT DOC, RNG: HCPCS | Performed by: INTERNAL MEDICINE

## 2021-01-04 PROCEDURE — 99214 OFFICE O/P EST MOD 30 MIN: CPT | Performed by: INTERNAL MEDICINE

## 2021-01-04 PROCEDURE — G8754 DIAS BP LESS 90: HCPCS | Performed by: INTERNAL MEDICINE

## 2021-01-04 PROCEDURE — 3288F FALL RISK ASSESSMENT DOCD: CPT | Performed by: INTERNAL MEDICINE

## 2021-01-04 PROCEDURE — G8536 NO DOC ELDER MAL SCRN: HCPCS | Performed by: INTERNAL MEDICINE

## 2021-01-04 PROCEDURE — 1090F PRES/ABSN URINE INCON ASSESS: CPT | Performed by: INTERNAL MEDICINE

## 2021-01-04 PROCEDURE — 1100F PTFALLS ASSESS-DOCD GE2>/YR: CPT | Performed by: INTERNAL MEDICINE

## 2021-01-04 RX ORDER — AMOXICILLIN AND CLAVULANATE POTASSIUM 875; 125 MG/1; MG/1
1 TABLET, FILM COATED ORAL EVERY 12 HOURS
Qty: 42 TAB | Refills: 0 | Status: SHIPPED | OUTPATIENT
Start: 2021-01-04 | End: 2021-01-25

## 2021-01-04 NOTE — PROGRESS NOTES
Progress Note:Follow up Visit    01/04/21     Patient continues to have this daily symptoms of cough productive of yellow to green mucus and more recently even brown in color with specks of blood. There are days when she has more mucus and has to expectorate large amounts of mucus. At her last visit I had ordered sputum cultures and HRCT of chest for further evaluation. She continues to remain stressed with taking care of her  with dementia and also her son and daughter-in-law moved in with 4 dogs in the house now  She denies any fever or chills. Denies any night sweats  Denies weight loss  She has not used any inhalers. Has not had any ER visits. Has been following up with cardiology and is now on Ciera Marten and continues to remain on Eliquis  Last imaging studies were in July 2018  Last PFT in April 2017  No interim PFTs     HPI:   Ms. Greg Ramos who carries history of COPD, recurrent bronchitis requiring visit to Urgent care, Diagnosis of PE on NOAC , Cardiomyopathy S/P pacemaker and defibrillator, and history of left breast cancer is evaluated for COPD/Chronic bronchitis work up. Last CT performed in MAY 2016 demonstrated PE, nonspecific mediastinal adenopathy, minimal atelectasis/small effusion, and hyperinflation. We have obtained follow up CT chest performed on 09/12/16 did not reveal any finding concerning PE or Mediastinal adenopathy. . During this interval time patient has not noticed any symptoms concerning COPD exacerbation. Her  RAMOS remains stable. 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 Subsequent follow ups- never has had TB. Allergies   Allergen Reactions    Lisinopril Rash     Hypertensive crisis    Tetracycline Anaphylaxis, Rash and Swelling    Hibiclens [Chlorhexidine Gluconate] Other (comments)     Turned red and BP drop low.     Other Medication Rash and Swelling     -Myacins     Current Outpatient Medications   Medication Sig Dispense Refill    metoprolol tartrate (LOPRESSOR) 100 mg IR tablet Take 1 tablet by mouth twice daily 180 Tab 3    dilTIAZem ER (Cartia XT) 240 mg capsule Take 1 capsule by mouth once daily 90 Cap 3    pravastatin (PRAVACHOL) 80 mg tablet Take 80 mg by mouth nightly.  metFORMIN (GLUMETZA ER) 500 mg TG24 24 hour tablet Take 500 mg by mouth daily.  coenzyme q10 (CO Q-10) 10 mg cap Take  by mouth.  MULTIVIT-MIN/IRON/FOLIC/LUTEIN (CENTRUM SILVER WOMEN PO) Take  by mouth.  ELIQUIS 5 mg tablet Take 1 Tab by mouth two (2) times a day. 120 Tab 3    escitalopram oxalate (LEXAPRO) 20 mg tablet Take 20 mg by mouth daily.  umeclidinium (INCRUSE ELLIPTA) 62.5 mcg/actuation inhaler Take 1 Puff by inhalation daily. 1 Inhaler 6    fluticasone (FLONASE) 50 mcg/actuation nasal spray 2 Sprays by Both Nostrils route daily. 1 Bottle 0    albuterol sulfate (PROAIR RESPICLICK) 90 mcg/actuation aepb Take 2 Puffs by inhalation every four (4) hours as needed. 1 Inhaler 3    loratadine (CLARITIN) 10 mg tablet Take 10 mg by mouth.  exemestane (AROMASIN) 25 mg tablet Take 25 mg by mouth daily. Review of Systems   Constitutional: Negative. HENT: Negative. Eyes: Negative. Respiratory: cough, SOB  Cardiovascular: Negative. Gastrointestinal: Negative. Genitourinary: Negative. Musculoskeletal: Negative. Skin: Negative. Neurological: Negative. Endo/Heme/Allergies: Negative. Psychiatric/Behavioral: depression      Blood pressure 135/83, pulse 66, temperature 98.1 °F (36.7 °C), temperature source Oral, resp. rate 18, height 5' 5\" (1.651 m), weight 92.1 kg (203 lb 1.6 oz), SpO2 96 %. Physical Exam   Constitutional: She is oriented to person, place, and time and well-developed, well-nourished, and in no distress. HENT:   Head: Normocephalic and atraumatic. Eyes: EOM are normal. Pupils are equal, round, and reactive to light. Neck: Normal range of motion. Neck supple. No thyromegaly present. Chest-kyphotic curvature  Cardiovascular: Normal rate and regular rhythm. Pulmonary/Chest: Effort normal. She has no wheezes. She has no rales. Abdominal: Soft. Bowel sounds are normal.   Musculoskeletal: Normal range of motion. She exhibits no edema. Neurological: She is alert and oriented to person, place, and time. Skin: Skin is warm and dry. Psychiatric: Affect and judgment normal.       Investigation:    Results from Hospital Encounter encounter on 10/01/20   CT CHEST WO CONT    Narrative EXAM:  CT Chest without Contrast.             CLINICAL INDICATION:      - J47.0 (ICD-10-CM) - Bronchiectasis with acute lower respiratory infection   - Lung nodule. COMPARISON:  No prior CTs available. Most recent available chest x-ray dated  3/28/11. TECHNIQUE:      Helically scanned volumetric axial sections of the chest are obtained without IV  contrast administration using current institutional modified standard HRCT  protocol, i.e. supine end-inspiratory phase, supine end-expiratory phase and  prone end-inspiratory phase scans with axial 1.5 mm reformats in 10 mm  increments. Coronal and sagittal multiplanar reconstruction images are  generated for improved anatomic delineation. Radiation dose optimization techniques are utilized as appropriate to the exam,  with combination of automated exposure control, adjustment of the mA and/or kV  according to patient's size (Including appropriate matching for site-specific  examinations), or use of iterative reconstruction technique. FINDINGS:     Lungs:      - There is subtle peripheral intralobular septal thickening/ reticulation  bilaterally at the lung bases without significant interval changes compared to  prior studies. No honeycombing. No definite bronchiectatic changes are  observed. - Focal area of scarring in the lingular region seen as a focus of wedge shaped  flat density.    - 0.2 cm nodule in the right lower lobe (axial #24). Although not mentioned  specifically on the prior studies, this density was present on the prior studies  as far back as 02/16/16.  - No infiltrate or consolidation is identified.  - No dominant lung mass. Pleura:  No significant pleural effusion is detected bilaterally. Mediastinum:  No adenopathy. Base of Neck, Chest Wall:  No adenopathy. ICD hub in the left upper torso. Chronic left breast medial aspect relatively thick-walled fluid collection  measuring about 3.8 x 2.8 cm. Probably related to chronic seroma related to  prior left breast surgery. Esophagus:  Mild hiatal hernia. Upper Abdomen:  No acute abnormalities. Bones:  Developmental variant anatomy in the lower thoracic region with 2 tandem  foci of partial fusion or incomplete segmentation of the vertebrae, i.e. T10-11  and T12-L1. Impression IMPRESSION:              1.  Slight bilateral lung base subtle peripheral intralobular septal thickening/  reticulation, stable compared to prior studies, nonspecific. 2.  Stable 0.2 cm nodule in the right lower lobe. 3.  Stable left breast chronic seroma. 4.  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 CT chest (05/10/16): There are filling defects in the upper, middle, and lower lobe branches of the right pulmonary artery. No evidence of embolus in the left pulmonary artery branches or in the central trunks. The main pulmonary artery measures 3.3 cm in diameter. The thoracic aorta is not aneurysmal.  No evidence for dissection.      Lymph nodes: Mediastinal lymph nodes are mildly prominent. A right paratracheal lymph node measures 1.2 x 0.9 cm. Previously, this lymph node measured 1.8 x 0.4 cm. There is no evidence of hilar lymphadenopathy.      Thyroid: Normal in size without mass in the visualized parenchyma. .      Mediastinum: There is a pacemaker in the left chest wall.  A fluid collection in the inferior left breast measures about 3.3 x 4.8 cm (previously, 2.8 x 4.4 cm). There is no evidence of peripheral enhancement. 2 adjacent biopsy clips are redemonstrated. There is a small hiatal hernia.      Lungs:    The lungs are diffusely hyperinflated with mild tracheobronchomalacia. Right Lung: There is a small posterior effusion with overlying atelectasis. No confluent infiltrate to suggest infarct.      Left Lung:  No evidence of infiltrate. There is a small focus of atelectasis in the posterior lingula. This is similar to previous exam. No pleural effusion.      Abdomen: There is diffuse fatty atrophy of the pancreas. Visualized portions of the liver, spleen, pancreas, adrenal glands, and kidneys are unremarkable. The gallbladder is normal.      Bones: A fracture deformity in the left anterior fifth rib is unchanged. Degenerative changes of the spine are stable. 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 There are no destructive lesions.      IMPRESSION:      1.  Multiple right pulmonary emboli. No evidence of left pulmonary emboli or pulmonary infarcts. .   2.  Small right pleural effusion. 3. Prominence of the main portal artery is suggestive of pulmonary artery hypertension. 4. COPD and mild tracheobronchomalacia. 5. Similar size of seroma in the left chest wall.    .          PFT(2008): mild obstructive changes   DATE 4/19/2017  Pre bronchodilator ( 2008) Post Bronchodilator (2008)   FVC 2. 11( 69)  2.12(67) 2.13(67)   FEV1 1.60( 69)  1.69(73) 1.64(71)   FEV1/FVC 76  79(73) 76   TLC 4.04(78)  4.32(84)     RV 2.14(79)  2.12(107)     RV/TLC   49(38)     DLCO 13.76( 61)  12.47(66)        PFT 4/19/17:  Mild to moderate restrictive impairment with reduced DLCO. Assessment:    Bronchiectasis: Recurrent bronchitis/COPD exacerbation with Mild-Moderate Restrictive impairment ? sequelae of prior inflammatory process. Recurrant bronchitis- lower respiratory tract infections treated with antibiotics. Elevated IgE raises ? Allergic triggers and possible role for additional therapy. Current symptoms are more consistent with chronic persistent bronchitis with cultures growing heavy normal garret. HRCT with nonspecific findings  History of COPD - exertional hypoxemia in past has now improved. PFTs with predominant restrictive ventilatory impairment likely from combination of bronchiectasis and kyphosis  Nonspecific Mediastinal adenopathy- S/P follow up CT chest demonstrated interval resolution  9/2016 CT. most recent CT scan from 10/1/2020 with nonspecific interstitial septal thickening and a 2 mm right lower lobe nodule. PE on NOAC ,5/206. Resolved on follow up CT in 9/2016. Atrial fibrillation- on anticoagulation, cardizem for rate control  H/o breast biopsy- 2010. H/o radiation           Plan:  Discussed results of HRCT and sputum cultures  I will treat her with a 21-day course of Augmentin in view of bronchiectasis  Action plan for acute exacerbations discussed  If continues to remain symptomatic and fails to improve will obtain pulmonary function test and consider adjusting bronchodilator therapy  Continue Albuterol two puffs, every six hours as needed -predominantly for mucociliary clearance-refills ordered. Further CT follow-up needed for 2 mm nodule in right lower lobe. Continue ELIQUIS as prescribed . Cardiology following for A. Fib, AICD  Encouraged active lifestyle and endurance activities. Preventive vaccinations-Will need influenza vaccine  RTC in 3  months time and sooner if needed.   Discussed at length current condition, pathophysiology, care plan including testing and treatment options with patient    An [facility]  Pulmonary and Critical Care Medicine

## 2021-01-04 NOTE — PROGRESS NOTES
Manju Jigna presents today for   Chief Complaint   Patient presents with    Results     Chest CT, Sputum to Lab        Is someone accompanying this pt? No    Is the patient using any DME equipment during OV? No    -DME Company NA    Depression Screening:  3 most recent PHQ Screens 9/25/2020   PHQ Not Done -   Little interest or pleasure in doing things Not at all   Feeling down, depressed, irritable, or hopeless Not at all   Total Score PHQ 2 0       Learning Assessment:  Learning Assessment 10/7/2015   PRIMARY LEARNER Patient   PRIMARY LANGUAGE ENGLISH   LEARNER PREFERENCE PRIMARY LISTENING   ANSWERED BY patient   RELATIONSHIP SELF       Abuse Screening:  Abuse Screening Questionnaire 9/25/2020   Do you ever feel afraid of your partner? N   Are you in a relationship with someone who physically or mentally threatens you? N   Is it safe for you to go home? Y       Fall Risk  Fall Risk Assessment, last 12 mths 9/25/2020   Able to walk? Yes   Fall in past 12 months? Yes   Number of falls in past 12 months 1   Fall with injury? 1         Coordination of Care:  1. Have you been to the ER, urgent care clinic since your last visit? Hospitalized since your last visit? No    2. Have you seen or consulted any other health care providers outside of the [address] [place] [address] since your last visit? Include any pap smears or colon screening.  No

## 2021-02-22 ENCOUNTER — TELEPHONE (OUTPATIENT)
Dept: CARDIOLOGY CLINIC | Age: 76
End: 2021-02-22

## 2021-02-22 NOTE — TELEPHONE ENCOUNTER
I returned Ms. Garay's call. She recently switched pharmacies and she wanted to confirm that Atkins XT is the same as diltiazem CD. She states that she used to get Cartia XT 240mg from [facility] but when she switched to [facility], she received Diltiazem CD 240mg. She was reassured that the medications are the same, just different  and slightly different name.

## 2021-03-24 ENCOUNTER — OFFICE VISIT (OUTPATIENT)
Dept: CARDIOLOGY CLINIC | Age: 76
End: 2021-03-24
Payer: MEDICARE

## 2021-03-24 VITALS
DIASTOLIC BLOOD PRESSURE: 68 MMHG | WEIGHT: 203 LBS | SYSTOLIC BLOOD PRESSURE: 120 MMHG | HEIGHT: 65 IN | HEART RATE: 68 BPM | BODY MASS INDEX: 33.82 KG/M2 | OXYGEN SATURATION: 98 %

## 2021-03-24 DIAGNOSIS — I26.99 OTHER ACUTE PULMONARY EMBOLISM WITHOUT ACUTE COR PULMONALE (HCC): ICD-10-CM

## 2021-03-24 DIAGNOSIS — I42.8 OTHER CARDIOMYOPATHY (HCC): Primary | ICD-10-CM

## 2021-03-24 DIAGNOSIS — J44.9 CHRONIC OBSTRUCTIVE PULMONARY DISEASE, UNSPECIFIED COPD TYPE (HCC): ICD-10-CM

## 2021-03-24 DIAGNOSIS — R59.0 MEDIASTINAL ADENOPATHY: ICD-10-CM

## 2021-03-24 DIAGNOSIS — I48.0 PAF (PAROXYSMAL ATRIAL FIBRILLATION) (HCC): ICD-10-CM

## 2021-03-24 DIAGNOSIS — Z95.810 BIVENTRICULAR IMPLANTABLE CARDIOVERTER-DEFIBRILLATOR IN SITU: ICD-10-CM

## 2021-03-24 DIAGNOSIS — I10 HYPERTENSION, UNSPECIFIED TYPE: ICD-10-CM

## 2021-03-24 PROCEDURE — 99215 OFFICE O/P EST HI 40 MIN: CPT | Performed by: INTERNAL MEDICINE

## 2021-03-24 PROCEDURE — 3288F FALL RISK ASSESSMENT DOCD: CPT | Performed by: INTERNAL MEDICINE

## 2021-03-24 PROCEDURE — G8536 NO DOC ELDER MAL SCRN: HCPCS | Performed by: INTERNAL MEDICINE

## 2021-03-24 PROCEDURE — G8427 DOCREV CUR MEDS BY ELIG CLIN: HCPCS | Performed by: INTERNAL MEDICINE

## 2021-03-24 PROCEDURE — G8754 DIAS BP LESS 90: HCPCS | Performed by: INTERNAL MEDICINE

## 2021-03-24 PROCEDURE — G8417 CALC BMI ABV UP PARAM F/U: HCPCS | Performed by: INTERNAL MEDICINE

## 2021-03-24 PROCEDURE — G8752 SYS BP LESS 140: HCPCS | Performed by: INTERNAL MEDICINE

## 2021-03-24 PROCEDURE — G8432 DEP SCR NOT DOC, RNG: HCPCS | Performed by: INTERNAL MEDICINE

## 2021-03-24 PROCEDURE — 1090F PRES/ABSN URINE INCON ASSESS: CPT | Performed by: INTERNAL MEDICINE

## 2021-03-24 PROCEDURE — 1100F PTFALLS ASSESS-DOCD GE2>/YR: CPT | Performed by: INTERNAL MEDICINE

## 2021-03-24 RX ORDER — PRAVASTATIN SODIUM 80 MG/1
80 TABLET ORAL
Qty: 30 TAB | Refills: 6 | Status: SHIPPED | OUTPATIENT
Start: 2021-03-24 | End: 2022-01-19

## 2021-03-24 RX ORDER — APIXABAN 5 MG/1
5 TABLET, FILM COATED ORAL 2 TIMES DAILY
Qty: 120 TAB | Refills: 3 | Status: SHIPPED | OUTPATIENT
Start: 2021-03-24 | End: 2021-12-07

## 2021-03-24 RX ORDER — DILTIAZEM HYDROCHLORIDE 240 MG/1
CAPSULE, COATED, EXTENDED RELEASE ORAL
Qty: 90 CAP | Refills: 3 | Status: SHIPPED | OUTPATIENT
Start: 2021-03-24 | End: 2022-02-03

## 2021-03-24 RX ORDER — METOPROLOL TARTRATE 100 MG/1
TABLET ORAL
Qty: 180 TAB | Refills: 3 | Status: SHIPPED | OUTPATIENT
Start: 2021-03-24 | End: 2021-09-24

## 2021-03-24 NOTE — PROGRESS NOTES
Alyssa Moore    Chief Complaint   Patient presents with    Follow-up     6 month f/u       HPI    Alyssa Moore is a 67 y.o. with h/o cardiomyopathy, ICD, pAFib here for routine follow up care. She feels fine from a CV standpoint, only complaint was \"poor circulation in her right lower extremity. \" No relation to exertion, no swelling, no recent trauma. The interrogation of her ICD demonstrated normal OptiVol and no tachyarrhythmia. There is no atrial fibrillation. She does have a great deal of knee pain and contemplating knee surgery in the future.      She has history of a left bundle branch block, hypertension, dyslipidemia and glucose intolerance. She has a 20 pack a year cigarette smoking history until 1988. She was told that she had a heart murmur and hole in her heart as a child, but this has not been clearly documented by echocardiogram or other diagnostic workup. She was seen by Dr. Yaritza Man in 2003, for the evaluation of left bundle branch block and had negative stress nuclear cardiac imaging. She was told that everything was okay by Dr. Yaritza Man at that time. She had repeat stress nuclear cardiac imaging on September 17, 2007, which demonstrated a moderate, fixed perfusion defect in the basal inferior and mid inferior wall, involving the inferior apex as well, with no significant ischemia. There was also moderately severe scarring in the anterior wall, anterior apex and anterior septum, with very mild associated ischemia. There was an akinetic interventricular septum and anterior apex, and mild hypokinesis of the proximal anterior wall, with moderate hypokinesis of the entire inferior wall, with overall EF in the 25% range. She subsequently underwent cardiac catheterization on February 27, 2008 which demonstrated patent coronary arteries, but severe LV dysfunction with EF in the 25-30% range. She then went on to have biventricular ICD implantation on May 12, 2008.  Her EF has improved to 56% by repeat echocardiogram in 2009. She had a repeat echocardiogram on August 13, 2010, which demonstrated once again improved LV function, with EF in the 55% range. Left atrial volume was normal at 23 ml/m². PA pressure was estimated normal as well.    Because of shortness of breath she had repeat echocardiogram on 4/16/2012 with demonstrated normal LV function with the EF in the 60 to 65% range. There was grade 1 diastolic dysfunction. PA pressure was estimated in the range of 25 to 30 mmHg. There was no significant valvular pathology.    She had an ultrasound examination of the aorta, which demonstrated possible aneurysm; however, the CT scan demonstrated no evidence of aneurysm whatsoever. She does not feel the palpitations much herself; however, her ICD interrogation demonstrated frequent episodes of atrial fibrillation, at times, very prolonged for hours. She had problems with worsening shortness of breath and she developed a cough and fever, and was seen in the emergency room on 05/30/16. She was ultimately diagnosed of DVT and pulmonary emboli by CT angiogram and has been placed on Eliquis. Her echocardiogram on 07/07/2016 demonstrated normal left ventricular size and lower limit of normal left ventricular systolic function with EF in the 50% range.  There was no significant valvular pathology.  Left atrial dimension was normal with a volume index of 25 mL/meter2.  There was no significant pulmonary hypertension.       She developed rapid atrial fibrillation associated with pneuminia and subsequently shocked by her ICD because of the fast heart rate in July 2018.  In the hospital, there was no evidence of decompensated congestive heart failure. She had an echocardiogram on 7/30/18 which demonstrated normal left ventricular systolic function with EF in the 55-60% range. PA pressure was estimated at 40 mmHg. There was no significant valvular pathology.  The left atrial dimension was normal with volume index of 31 ml/m sq. The interrogation of the ICD demonstrated once again the episode of left atrial fibrillation and ICD shock on 7/29/18. Her OptiVol was normal indicating normal volume status at that time. Past Medical History:   Diagnosis Date    CAD (coronary artery disease)     cardiomyopathy,CHF,,Cardiac Cath, pacemaker/defib.  Cancer [facility]) 2013    breast    Cardiac catheterization 02/27/2008    Patent coronary arteries. LVEDP 13 mmHg. EF 25-30%. Global hypk.  Cardiac echocardiogram 07/07/2016    EF 50% (prev 60-65% on study of 4/16/12). No WMA. Gr 1 DDfx. Normal RVSP.  Cardiac nuclear imaging test 05/31/2013    No evidence of ongoing ischemia or prior infarction. EF 60%. No RWMA. Mild dyssynchrony of inferior base & mid/distal septum likely c/w pacemaker. Nondiagnostic EKG on pharm stress test due to V-pacing.  Cardiovascular abdominal aorta duplex 09/23/2015    Fusiform AAA in prox & mid portions of abdom Ao.     Chronic lung disease     COPD (chronic obstructive pulmonary disease) (HCC) chronic bronchitis    Coronary artery disease Feb. 2008    Possible CAD with nonischemic dilated cardiomyopathy/EF 25%/ improved EF up to 56% by echocardiogram.    Coronary artery disease     Diabetes mellitus ([address]     GERD (gastroesophageal reflux disease)     Heart failure (HCC)     Heart murmur     Hemangioma of liver     Hx of cardiomyopathy ([address]     Hypercholesterolemia     Hypertension     Left bundle branch block     Phlebitis     PVD (peripheral vascular disease) ([address]     Renal calculi 1999    Thromboembolus ([address]     below knee in R leg       Past Surgical History:   Procedure Laterality Date    COLONOSCOPY N/A 3/7/2017    COLONOSCOPY, SURVEILLANCE with polypectomy performed by Ashley Linn MD at [address] [place] [address] HX BREAST LUMPECTOMY Left 8/2013    cancerous lesion    HX CATARACT REMOVAL Bilateral 1980s    w/ lens implants    HX CATARACT REMOVAL Left     re vised due to implant repositioning    HX HEART CATHETERIZATION  02/27/08    Patent coronary arteries, but severe LV dysfunction w/ EF in the 25-30% range.  HX KNEE ARTHROSCOPY Right     HX MASTECTOMY Left 9/24/2014    LEFT PARTIAL MASTECTOMY WITH NEEDLE LOCALIZATION MAMMOGRAM TIMES TWO performed by Ronan Asif MD at [address] [place] [address] HX PACEMAKER  May 2008    Biventricular ICD implantation     HX PACEMAKER  2012    replacement - Med[employer]onic    HX PACEMAKER PLACEMENT      HX PELVIC LAPAROSCOPY      diagnostic - varicosities       Current Outpatient Medications   Medication Sig Dispense Refill    albuterol sulfate (ProAir RespiClick) 90 mcg/actuation breath activated inhaler Take 2 Puffs by inhalation every four (4) hours as needed for Cough. 1 Inhaler 3    metoprolol tartrate (LOPRESSOR) 100 mg IR tablet Take 1 tablet by mouth twice daily 180 Tab 3    dilTIAZem ER (Cartia XT) 240 mg capsule Take 1 capsule by mouth once daily 90 Cap 3    pravastatin (PRAVACHOL) 80 mg tablet Take 80 mg by mouth nightly.  metFORMIN (GLUMETZA ER) 500 mg TG24 24 hour tablet Take 500 mg by mouth daily.  coenzyme q10 (CO Q-10) 10 mg cap Take  by mouth.  umeclidinium (INCRUSE ELLIPTA) 62.5 mcg/actuation inhaler Take 1 Puff by inhalation daily. 1 Inhaler 6    fluticasone (FLONASE) 50 mcg/actuation nasal spray 2 Sprays by Both Nostrils route daily. 1 Bottle 0    loratadine (CLARITIN) 10 mg tablet Take 10 mg by mouth.  MULTIVIT-MIN/IRON/FOLIC/LUTEIN (CENTRUM SILVER WOMEN PO) Take  by mouth.  ELIQUIS 5 mg tablet Take 1 Tab by mouth two (2) times a day. 120 Tab 3    escitalopram oxalate (LEXAPRO) 20 mg tablet Take 20 mg by mouth daily.  exemestane (AROMASIN) 25 mg tablet Take 25 mg by mouth daily. Allergies   Allergen Reactions    Lisinopril Rash     Hypertensive crisis    Tetracycline Anaphylaxis, Rash and Swelling    Hibiclens [Chlorhexidine Gluconate] Other (comments)     Turned red and BP drop low.  Other Medication Rash and Swelling     -Myacins       Social History     Socioeconomic History    Marital status:      Spouse name: Not on file    Number of children: Not on file    Years of education: Not on file    Highest education level: Not on file   Occupational History    Not on file   Social Needs    Financial resource strain: Not on file    Food insecurity     Worry: Not on file     Inability: Not on file    Transportation needs     Medical: Not on file     Non-medical: Not on file   Tobacco Use    Smoking status: Former Smoker     Packs/day: 1.50     Years: 20.00     Pack years: 30.00     Types: Cigarettes     Start date: 4/15/1963     Quit date: 1988     Years since quittin.1    Smokeless tobacco: Never Used   Substance and Sexual Activity    Alcohol use: No    Drug use: No    Sexual activity: Not on file   Lifestyle    Physical activity     Days per week: Not on file     Minutes per session: Not on file    Stress: Not on file   Relationships    Social connections     Talks on phone: Not on file     Gets together: Not on file     Attends Buddhist service: Not on file     Active member of club or organization: Not on file     Attends meetings of clubs or organizations: Not on file     Relationship status: Not on file    Intimate partner violence     Fear of current or ex partner: Not on file     Emotionally abused: Not on file     Physically abused: Not on file     Forced sexual activity: Not on file   Other Topics Concern    Not on file   Social History Narrative    Not on file    retired [address],  is also my pt, long time [employer] pts    Review of Systems    14 pt Review of Systems is negative unless otherwise mentioned in the HPI.     Wt Readings from Last 3 Encounters:   21 92.1 kg (203 lb)   21 92.1 kg (203 lb 1.6 oz)   20 94.3 kg (208 lb)     Temp Readings from Last 3 Encounters:   21 98.1 °F (36.7 °C) (Oral)   09/15/20 98.2 °F (36.8 °C)   02/13/20 97.3 °F (36.3 °C) (Oral)     BP Readings from Last 3 Encounters:   03/24/21 120/68   01/04/21 135/83   09/25/20 100/60     Pulse Readings from Last 3 Encounters:   03/24/21 68   01/04/21 66   09/25/20 67       07/29/18   ECHO ADULT COMPLETE 07/30/2018 7/30/2018    Narrative · Technically difficult study with poor endocardial visualization. Definity contrast was given to enhance imaging. · Estimated left ventricular ejection fraction is 56 - 60%. Left   ventricular mild concentric hypertrophy observed. Normal left ventricular   wall motion, no regional wall motion abnormality noted. Age-appropriate   left ventricular diastolic function. · Mild tricuspid valve regurgitation is present. Pulmonary arterial   systolic pressure is 40 mmHg. Mild pulmonary hypertension is present. · Pacer/ICD present in the right ventricle. · Mitral annular calcification. Signed by: Marcia Echevarria MD       Physical Exam:    Visit Vitals  /68 (BP 1 Location: Right upper arm, BP Patient Position: Sitting, BP Cuff Size: Large adult)   Pulse 68   Ht 5' 5\" (1.651 m)   Wt 92.1 kg (203 lb)   SpO2 98%   BMI 33.78 kg/m²      Physical Exam  HENT:      Head: Normocephalic and atraumatic. Eyes:      Pupils: Pupils are equal, round, and reactive to light. Cardiovascular:      Rate and Rhythm: Normal rate and regular rhythm. Heart sounds: Normal heart sounds. No murmur. No friction rub. No gallop. Pulmonary:      Effort: Pulmonary effort is normal. No respiratory distress. Breath sounds: Normal breath sounds. No wheezing or rales. Chest:      Chest wall: No tenderness. Abdominal:      General: Bowel sounds are normal.      Palpations: Abdomen is soft. Musculoskeletal:         General: No tenderness. Skin:     General: Skin is warm and dry. Neurological:      Mental Status: She is alert and oriented to person, place, and time.    Psychiatric:      Comments: Appropriate, but pressured tangential speech, can be very \"picky\" opinionated with some splitting behavior exhibited       Device check    Impression and Plan:  Goran Gray is a 67 y.o. with:    1.) Cardiomyopathy, more recently back to normal 55%  2.) pAFib, with h/o inapprop ICD shocks, known  3.) ICD, known  4.) h/o DVT/ PE    1.) Doing well on med tx, no changes  2.) RTC 6 months with device check    >60 mins spent, reviewing her hx again- since the story of her birth,  Not pursuing knee surgery at this time  Changing PCPs again. Thank you for allowing me to participate in the care of your patient, please do not hesitate to call with questions or concerns. Follow-up and Dispositions    · Return in about 6 months (around 9/24/2021).      Kindest Regards,    Promise Dickinson, DO

## 2021-03-26 ENCOUNTER — TRANSCRIBE ORDER (OUTPATIENT)
Dept: SCHEDULING | Age: 76
End: 2021-03-26

## 2021-03-26 DIAGNOSIS — M81.0 OSTEOPOROSIS: Primary | ICD-10-CM

## 2021-03-30 ENCOUNTER — HOSPITAL ENCOUNTER (OUTPATIENT)
Dept: BONE DENSITY | Age: 76
Discharge: HOME | End: 2021-03-30
Payer: MEDICARE

## 2021-03-30 DIAGNOSIS — M81.0 OSTEOPOROSIS: ICD-10-CM

## 2021-03-30 PROCEDURE — 77080 DXA BONE DENSITY AXIAL: CPT

## 2021-04-06 ENCOUNTER — OFFICE VISIT (OUTPATIENT)
Dept: PULMONOLOGY | Age: 76
End: 2021-04-06
Payer: MEDICARE

## 2021-04-06 VITALS
SYSTOLIC BLOOD PRESSURE: 110 MMHG | HEIGHT: 65 IN | DIASTOLIC BLOOD PRESSURE: 70 MMHG | WEIGHT: 202 LBS | TEMPERATURE: 98 F | RESPIRATION RATE: 20 BRPM | HEART RATE: 66 BPM | BODY MASS INDEX: 33.66 KG/M2 | OXYGEN SATURATION: 96 %

## 2021-04-06 DIAGNOSIS — I48.0 PAF (PAROXYSMAL ATRIAL FIBRILLATION) (HCC): ICD-10-CM

## 2021-04-06 DIAGNOSIS — J47.0 BRONCHIECTASIS WITH ACUTE LOWER RESPIRATORY INFECTION (HCC): Primary | ICD-10-CM

## 2021-04-06 PROBLEM — N95.1 MENOPAUSAL SYMPTOM: Status: ACTIVE | Noted: 2021-04-06

## 2021-04-06 PROBLEM — Z13.71 BRCA NEGATIVE: Status: ACTIVE | Noted: 2021-04-06

## 2021-04-06 PROBLEM — M89.9 DISORDER OF BONE AND ARTICULAR CARTILAGE: Status: ACTIVE | Noted: 2021-04-06

## 2021-04-06 PROBLEM — M94.9 DISORDER OF BONE AND ARTICULAR CARTILAGE: Status: ACTIVE | Noted: 2021-04-06

## 2021-04-06 PROBLEM — N64.89 SEROMA OF BREAST: Status: ACTIVE | Noted: 2021-04-06

## 2021-04-06 PROBLEM — M85.80 AGE-RELATED BONE LOSS: Status: ACTIVE | Noted: 2021-01-11

## 2021-04-06 PROCEDURE — G8536 NO DOC ELDER MAL SCRN: HCPCS | Performed by: INTERNAL MEDICINE

## 2021-04-06 PROCEDURE — 1090F PRES/ABSN URINE INCON ASSESS: CPT | Performed by: INTERNAL MEDICINE

## 2021-04-06 PROCEDURE — G8752 SYS BP LESS 140: HCPCS | Performed by: INTERNAL MEDICINE

## 2021-04-06 PROCEDURE — G8427 DOCREV CUR MEDS BY ELIG CLIN: HCPCS | Performed by: INTERNAL MEDICINE

## 2021-04-06 PROCEDURE — G8432 DEP SCR NOT DOC, RNG: HCPCS | Performed by: INTERNAL MEDICINE

## 2021-04-06 PROCEDURE — G8754 DIAS BP LESS 90: HCPCS | Performed by: INTERNAL MEDICINE

## 2021-04-06 PROCEDURE — 3288F FALL RISK ASSESSMENT DOCD: CPT | Performed by: INTERNAL MEDICINE

## 2021-04-06 PROCEDURE — G8417 CALC BMI ABV UP PARAM F/U: HCPCS | Performed by: INTERNAL MEDICINE

## 2021-04-06 PROCEDURE — 99214 OFFICE O/P EST MOD 30 MIN: CPT | Performed by: INTERNAL MEDICINE

## 2021-04-06 PROCEDURE — 1100F PTFALLS ASSESS-DOCD GE2>/YR: CPT | Performed by: INTERNAL MEDICINE

## 2021-04-06 RX ORDER — ENOXAPARIN SODIUM 150 MG/ML
INJECTION SUBCUTANEOUS
COMMUNITY
End: 2021-06-14

## 2021-04-06 RX ORDER — CITALOPRAM 40 MG/1
TABLET, FILM COATED ORAL
COMMUNITY
End: 2021-06-14

## 2021-04-06 RX ORDER — PREDNISONE 20 MG/1
TABLET ORAL
COMMUNITY
End: 2021-06-14

## 2021-04-06 RX ORDER — ATORVASTATIN CALCIUM 40 MG/1
TABLET, FILM COATED ORAL
COMMUNITY
End: 2021-09-23

## 2021-04-06 RX ORDER — MONTELUKAST SODIUM 10 MG/1
TABLET ORAL
COMMUNITY
End: 2021-06-14

## 2021-04-06 RX ORDER — ALENDRONATE SODIUM 70 MG/1
TABLET ORAL
COMMUNITY
End: 2021-06-14

## 2021-04-06 RX ORDER — POLYETHYLENE GLYCOL 3350 17 G/17G
POWDER, FOR SOLUTION ORAL
COMMUNITY
End: 2021-06-14

## 2021-04-06 RX ORDER — METHOCARBAMOL 500 MG/1
TABLET, FILM COATED ORAL
COMMUNITY
End: 2021-06-14

## 2021-04-06 RX ORDER — HYDROXYZINE 25 MG/1
TABLET, FILM COATED ORAL
COMMUNITY
End: 2021-06-14

## 2021-04-06 RX ORDER — WARFARIN SODIUM 5 MG/1
TABLET ORAL
COMMUNITY
End: 2021-06-14

## 2021-04-06 RX ORDER — PRAVASTATIN SODIUM 80 MG/1
80 TABLET ORAL
COMMUNITY
End: 2021-09-23 | Stop reason: SDUPTHER

## 2021-04-06 RX ORDER — LETROZOLE 2.5 MG/1
TABLET, FILM COATED ORAL
COMMUNITY
End: 2021-06-14

## 2021-04-06 RX ORDER — MELATONIN
1000
COMMUNITY
End: 2022-03-17

## 2021-04-06 RX ORDER — HYDROXYZINE PAMOATE 25 MG/1
CAPSULE ORAL
COMMUNITY
End: 2021-06-14

## 2021-04-06 RX ORDER — METOPROLOL SUCCINATE 50 MG/1
TABLET, EXTENDED RELEASE ORAL
COMMUNITY
End: 2021-09-24

## 2021-04-06 RX ORDER — OXYCODONE AND ACETAMINOPHEN 5; 325 MG/1; MG/1
TABLET ORAL
COMMUNITY
End: 2021-06-14

## 2021-04-06 RX ORDER — ASPIRIN 81 MG/1
81 TABLET ORAL DAILY
COMMUNITY
End: 2021-06-14

## 2021-04-06 RX ORDER — HYDROCODONE BITARTRATE AND ACETAMINOPHEN 5; 325 MG/1; MG/1
TABLET ORAL
COMMUNITY
End: 2021-06-14

## 2021-04-06 NOTE — PROGRESS NOTES
Progress Note:Follow up Visit    04/06/21   Patient states that she is feeling better significantly since the last course of antibiotics. The cough is no longer green  Occasionally she has mucus which is beige in color predominantly at nighttime when she is able to expectorate it. She has not had any increase in shortness of breath wheezing, no fever chills or night sweats  She continues to remain stressed with taking care of her  with dementia and also her son and daughter-in-law moved in with 4 dogs in the house now  Denies weight loss  She has not used any inhalers. Has not had any ER visits. Has been following up with cardiology and is now on [place] Lean and continues to remain on Eliquis  Last imaging studies were in July 2018  Last PFT in April 2017  No interim PFTs  She is yet to receive COVID-19 vaccine. She is asking me if she should get it. She has ability to get it through the [place] since her spouse has [employer] benefits     HPI:   Ms. Lisa Rodríguez who carries history of COPD, recurrent bronchitis requiring visit to Urgent care, Diagnosis of PE on NOAC , Cardiomyopathy S/P pacemaker and defibrillator, and history of left breast cancer is evaluated for COPD/Chronic bronchitis work up. Last CT performed in MAY 2016 demonstrated PE, nonspecific mediastinal adenopathy, minimal atelectasis/small effusion, and hyperinflation. We have obtained follow up CT chest performed on 09/12/16 did not reveal any finding concerning PE or Mediastinal adenopathy. . During this interval time patient has not noticed any symptoms concerning COPD exacerbation. Her  RAMOS remains stable. 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 Subsequent follow ups- never has had TB. Allergies   Allergen Reactions    Lisinopril Rash     Hypertensive crisis    Tetracycline Anaphylaxis, Rash and Swelling    Hibiclens [Chlorhexidine Gluconate] Other (comments)     Turned red and BP drop low.     Other Medication Rash and Swelling     -Myacins     Current Outpatient Medications   Medication Sig Dispense Refill    citalopram (CELEXA) 40 mg tablet citalopram 40 mg tablet      metoprolol succinate (TOPROL-XL) 50 mg XL tablet metoprolol succinate ER 50 mg tablet,extended release 24 hr      pravastatin (PRAVACHOL) 80 mg tablet Take 80 mg by mouth nightly.  dilTIAZem ER (Cartia XT) 240 mg capsule Take 1 capsule by mouth once daily 90 Cap 3    pravastatin (PRAVACHOL) 80 mg tablet Take 1 Tab by mouth nightly. 30 Tab 6    Eliquis 5 mg tablet Take 1 Tab by mouth two (2) times a day. 120 Tab 3    albuterol sulfate (ProAir RespiClick) 90 mcg/actuation breath activated inhaler Take 2 Puffs by inhalation every four (4) hours as needed for Cough. 1 Inhaler 3    metFORMIN (GLUMETZA ER) 500 mg TG24 24 hour tablet Take 500 mg by mouth daily.  coenzyme q10 (CO Q-10) 10 mg cap Take  by mouth.  escitalopram oxalate (LEXAPRO) 20 mg tablet Take 20 mg by mouth daily.  alendronate (FOSAMAX) 70 mg tablet alendronate 70 mg tablet      aspirin delayed-release 81 mg tablet Take 81 mg by mouth daily.  atorvastatin (LIPITOR) 40 mg tablet atorvastatin 40 mg tablet      cholecalciferol (VITAMIN D3) (1000 Units /25 mcg) tablet Take 1,000 Units by mouth.       enoxaparin (LOVENOX) 120 mg/0.8 mL injection enoxaparin 120 mg/0.8 mL subcutaneous syringe      HYDROcodone-acetaminophen (NORCO) 5-325 mg per tablet hydrocodone 5 mg-acetaminophen 325 mg tablet      hydrOXYzine HCL (ATARAX) 25 mg tablet hydroxyzine HCl 25 mg tablet      hydrOXYzine pamoate (VISTARIL) 25 mg capsule hydroxyzine pamoate 25 mg capsule      letrozole (FEMARA) 2.5 mg tablet letrozole 2.5 mg tablet      methocarbamoL (ROBAXIN) 500 mg tablet methocarbamol 500 mg tablet      montelukast (SINGULAIR) 10 mg tablet montelukast 10 mg tablet      polyethylene glycol (MIRALAX) 17 gram/dose powder polyethylene glycol 3350 17 gram/dose oral powder      predniSONE (DELTASONE) 20 mg tablet prednisone 20 mg tablet      warfarin (COUMADIN) 5 mg tablet warfarin 5 mg tablet      oxyCODONE-acetaminophen (PERCOCET) 5-325 mg per tablet oxycodone-acetaminophen 5 mg-325 mg tablet      metoprolol tartrate (LOPRESSOR) 100 mg IR tablet Take 1 tablet by mouth twice daily 180 Tab 3    umeclidinium (INCRUSE ELLIPTA) 62.5 mcg/actuation inhaler Take 1 Puff by inhalation daily. 1 Inhaler 6    fluticasone (FLONASE) 50 mcg/actuation nasal spray 2 Sprays by Both Nostrils route daily. 1 Bottle 0    loratadine (CLARITIN) 10 mg tablet Take 10 mg by mouth.  MULTIVIT-MIN/IRON/FOLIC/LUTEIN (CENTRUM SILVER WOMEN PO) Take  by mouth.  exemestane (AROMASIN) 25 mg tablet Take 25 mg by mouth daily. Review of Systems   Constitutional: Negative. HENT: Negative. Eyes: Negative. Respiratory: cough, SOB  Cardiovascular: Negative. Gastrointestinal: Negative. Genitourinary: Negative. Musculoskeletal: Negative. Skin: Negative. Neurological: Negative. Endo/Heme/Allergies: Negative. Psychiatric/Behavioral: depression      Blood pressure 110/70, pulse 66, temperature 98 °F (36.7 °C), resp. rate 20, height 5' 5\" (1.651 m), weight 91.6 kg (202 lb), SpO2 96 %. Physical Exam   Constitutional: She is oriented to person, place, and time and well-developed, well-nourished, and in no distress. HENT:   Head: Normocephalic and atraumatic. Eyes: EOM are normal. Pupils are equal, round, and reactive to light. Neck: Normal range of motion. Neck supple. No thyromegaly present. Chest-kyphotic curvature  Cardiovascular: Normal rate and regular rhythm. Pulmonary/Chest: Effort normal. She has no wheezes. She has no rales. Abdominal: Soft. Bowel sounds are normal.   Musculoskeletal: Normal range of motion. She exhibits no edema. Neurological: She is alert and oriented to person, place, and time. Skin: Skin is warm and dry.    Psychiatric: Affect and judgment normal. Investigation:    Results from Hospital Encounter encounter on 10/01/20   CT CHEST WO CONT    Narrative EXAM:  CT Chest without Contrast.             CLINICAL INDICATION:      - J47.0 (ICD-10-CM) - Bronchiectasis with acute lower respiratory infection   - Lung nodule. COMPARISON:  No prior CTs available. Most recent available chest x-ray dated  3/28/11. TECHNIQUE:      Helically scanned volumetric axial sections of the chest are obtained without IV  contrast administration using current institutional modified standard HRCT  protocol, i.e. supine end-inspiratory phase, supine end-expiratory phase and  prone end-inspiratory phase scans with axial 1.5 mm reformats in 10 mm  increments. Coronal and sagittal multiplanar reconstruction images are  generated for improved anatomic delineation. Radiation dose optimization techniques are utilized as appropriate to the exam,  with combination of automated exposure control, adjustment of the mA and/or kV  according to patient's size (Including appropriate matching for site-specific  examinations), or use of iterative reconstruction technique. FINDINGS:     Lungs:      - There is subtle peripheral intralobular septal thickening/ reticulation  bilaterally at the lung bases without significant interval changes compared to  prior studies. No honeycombing. No definite bronchiectatic changes are  observed. - Focal area of scarring in the lingular region seen as a focus of wedge shaped  flat density.    - 0.2 cm nodule in the right lower lobe (axial #24). Although not mentioned  specifically on the prior studies, this density was present on the prior studies  as far back as 02/16/16.  - No infiltrate or consolidation is identified.  - No dominant lung mass. Pleura:  No significant pleural effusion is detected bilaterally. Mediastinum:  No adenopathy. Base of Neck, Chest Wall:  No adenopathy. ICD hub in the left upper torso.    Chronic left breast medial aspect relatively thick-walled fluid collection  measuring about 3.8 x 2.8 cm. Probably related to chronic seroma related to  prior left breast surgery. Esophagus:  Mild hiatal hernia. Upper Abdomen:  No acute abnormalities. Bones:  Developmental variant anatomy in the lower thoracic region with 2 tandem  foci of partial fusion or incomplete segmentation of the vertebrae, i.e. T10-11  and T12-L1. Impression IMPRESSION:              1.  Slight bilateral lung base subtle peripheral intralobular septal thickening/  reticulation, stable compared to prior studies, nonspecific. 2.  Stable 0.2 cm nodule in the right lower lobe. 3.  Stable left breast chronic seroma. 4.  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 CT chest (05/10/16): There are filling defects in the upper, middle, and lower lobe branches of the right pulmonary artery. No evidence of embolus in the left pulmonary artery branches or in the central trunks. The main pulmonary artery measures 3.3 cm in diameter. The thoracic aorta is not aneurysmal.  No evidence for dissection.      Lymph nodes: Mediastinal lymph nodes are mildly prominent. A right paratracheal lymph node measures 1.2 x 0.9 cm. Previously, this lymph node measured 1.8 x 0.4 cm. There is no evidence of hilar lymphadenopathy.      Thyroid: Normal in size without mass in the visualized parenchyma. .      Mediastinum: There is a pacemaker in the left chest wall. A fluid collection in the inferior left breast measures about 3.3 x 4.8 cm (previously, 2.8 x 4.4 cm). There is no evidence of peripheral enhancement. 2 adjacent biopsy clips are redemonstrated. There is a small hiatal hernia.      Lungs:    The lungs are diffusely hyperinflated with mild tracheobronchomalacia. Right Lung: There is a small posterior effusion with overlying atelectasis.  No confluent infiltrate to suggest infarct.      Left Lung:  No evidence of infiltrate. There is a small focus of atelectasis in the posterior lingula. This is similar to previous exam. No pleural effusion.      Abdomen: There is diffuse fatty atrophy of the pancreas. Visualized portions of the liver, spleen, pancreas, adrenal glands, and kidneys are unremarkable. The gallbladder is normal.      Bones: A fracture deformity in the left anterior fifth rib is unchanged. Degenerative changes of the spine are stable. 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 There are no destructive lesions.      IMPRESSION:      1.  Multiple right pulmonary emboli. No evidence of left pulmonary emboli or pulmonary infarcts. .   2.  Small right pleural effusion. 3. Prominence of the main portal artery is suggestive of pulmonary artery hypertension. 4. COPD and mild tracheobronchomalacia. 5. Similar size of seroma in the left chest wall.    .          PFT(2008): mild obstructive changes   DATE 4/19/2017  Pre bronchodilator ( 2008) Post Bronchodilator (2008)   FVC 2. 11( 69)  2.12(67) 2.13(67)   FEV1 1.60( 69)  1.69(73) 1.64(71)   FEV1/FVC 76  79(73) 76   TLC 4.04(78)  4.32(84)     RV 2.14(79)  2.12(107)     RV/TLC   49(38)     DLCO 13.76( 61)  12.47(66)        PFT 4/19/17:  Mild to moderate restrictive impairment with reduced DLCO. Assessment:    Bronchiectasis: Recurrent bronchitis/COPD exacerbation with Mild-Moderate Restrictive impairment ? sequelae of prior inflammatory process. Recurrant bronchitis- lower respiratory tract infections treated with antibiotics. Elevated IgE raises ? Allergic triggers and possible role for additional therapy. Current symptoms are more consistent with chronic persistent bronchitis with cultures growing heavy normal garret. HRCT with nonspecific findings  History of COPD - exertional hypoxemia in past has now improved.   PFTs with predominant restrictive ventilatory impairment likely from combination of bronchiectasis and kyphosis  Nonspecific Mediastinal adenopathy- S/P follow up CT chest demonstrated interval resolution  9/2016 CT. most recent CT scan from 10/1/2020 with nonspecific interstitial septal thickening and a 2 mm right lower lobe nodule. PE on NOAC ,5/206. Resolved on follow up CT in 9/2016. Atrial fibrillation- on anticoagulation, cardizem for rate control  H/o breast biopsy- 2010. H/o radiation           Plan:  Continue with current treatment  Action plan for acute exacerbations discussed  If continues to remain symptomatic and fails to improve will obtain pulmonary function test and consider adjusting bronchodilator therapy  Continue Albuterol two puffs, every six hours as needed -predominantly for mucociliary clearance-refills ordered. Further CT follow-up needed for 2 mm nodule in right lower lobe. Continue ELIQUIS as prescribed . Cardiology following for FROYLAN Zamora  Encouraged active lifestyle and endurance activities. Preventive vaccinations-I have discussed need for Covid vaccine  RTC in 6 months time and sooner if needed.   Discussed at length current condition, pathophysiology, care plan including testing and treatment options with patient    Ramos Oconnell  Pulmonary and Critical Care Medicine

## 2021-04-06 NOTE — LETTER
4/6/2021 Patient: Hal Brown YOB: 1945 Date of Visit: 4/6/2021 Sandip Borden MD 
[facility] [address] [facility] [address] [address] Via Fax: [phone] Dear Sandip Borden MD, Thank you for referring Ms. Shayla Aly to [address] [place] [address] for evaluation. My notes for this consultation are attached. If you have questions, please do not hesitate to call me. I look forward to following your patient along with you.  
 
 
Sincerely, 
 
Gilberto Saenz MD

## 2021-05-05 RX ORDER — SODIUM CHLORIDE 0.9 % (FLUSH) 0.9 %
10 SYRINGE (ML) INJECTION AS NEEDED
Status: CANCELLED | OUTPATIENT
Start: 2021-05-12

## 2021-05-05 RX ORDER — HYDROCORTISONE SODIUM SUCCINATE 100 MG/2ML
100 INJECTION, POWDER, FOR SOLUTION INTRAMUSCULAR; INTRAVENOUS AS NEEDED
Status: CANCELLED | OUTPATIENT
Start: 2021-05-12

## 2021-05-05 RX ORDER — DIPHENHYDRAMINE HYDROCHLORIDE 50 MG/ML
25 INJECTION, SOLUTION INTRAMUSCULAR; INTRAVENOUS AS NEEDED
Status: CANCELLED
Start: 2021-05-12

## 2021-05-05 RX ORDER — ALBUTEROL SULFATE 0.83 MG/ML
2.5 SOLUTION RESPIRATORY (INHALATION) AS NEEDED
Status: CANCELLED
Start: 2021-05-12

## 2021-05-05 RX ORDER — SODIUM CHLORIDE 9 MG/ML
10 INJECTION INTRAMUSCULAR; INTRAVENOUS; SUBCUTANEOUS AS NEEDED
Status: CANCELLED | OUTPATIENT
Start: 2021-05-12

## 2021-05-05 RX ORDER — HEPARIN 100 UNIT/ML
300-500 SYRINGE INTRAVENOUS AS NEEDED
Status: CANCELLED
Start: 2021-05-12

## 2021-05-05 RX ORDER — DIPHENHYDRAMINE HYDROCHLORIDE 50 MG/ML
50 INJECTION, SOLUTION INTRAMUSCULAR; INTRAVENOUS AS NEEDED
Status: CANCELLED
Start: 2021-05-12

## 2021-05-05 RX ORDER — ACETAMINOPHEN 325 MG/1
650 TABLET ORAL AS NEEDED
Status: CANCELLED
Start: 2021-05-12

## 2021-05-05 RX ORDER — EPINEPHRINE 1 MG/ML
0.3 INJECTION, SOLUTION, CONCENTRATE INTRAVENOUS AS NEEDED
Status: CANCELLED | OUTPATIENT
Start: 2021-05-12

## 2021-05-05 RX ORDER — ONDANSETRON 2 MG/ML
8 INJECTION INTRAMUSCULAR; INTRAVENOUS AS NEEDED
Status: CANCELLED | OUTPATIENT
Start: 2021-05-12

## 2021-05-07 ENCOUNTER — HOSPITAL ENCOUNTER (OUTPATIENT)
Dept: INFUSION THERAPY | Age: 76
Discharge: HOME | End: 2021-05-07
Payer: MEDICARE

## 2021-05-07 VITALS
TEMPERATURE: 98.1 F | DIASTOLIC BLOOD PRESSURE: 77 MMHG | SYSTOLIC BLOOD PRESSURE: 131 MMHG | RESPIRATION RATE: 16 BRPM | HEART RATE: 60 BPM | OXYGEN SATURATION: 95 %

## 2021-05-07 LAB
ALBUMIN SERPL-MCNC: 3.9 G/DL (ref 3.4–5)
ALBUMIN/GLOB SERPL: 1.2 {RATIO} (ref 0.8–1.7)
ALP SERPL-CCNC: 89 U/L (ref 45–117)
ALT SERPL-CCNC: 13 U/L (ref 13–56)
ANION GAP SERPL CALC-SCNC: 5 MMOL/L (ref 3–18)
AST SERPL-CCNC: 13 U/L (ref 10–38)
BILIRUB SERPL-MCNC: 0.6 MG/DL (ref 0.2–1)
BUN SERPL-MCNC: 19 MG/DL (ref 7–18)
BUN/CREAT SERPL: 21 (ref 12–20)
CALCIUM SERPL-MCNC: 8.8 MG/DL (ref 8.5–10.1)
CHLORIDE SERPL-SCNC: 107 MMOL/L (ref 100–111)
CO2 SERPL-SCNC: 27 MMOL/L (ref 21–32)
CREAT SERPL-MCNC: 0.92 MG/DL (ref 0.6–1.3)
GLOBULIN SER CALC-MCNC: 3.3 G/DL (ref 2–4)
GLUCOSE SERPL-MCNC: 93 MG/DL (ref 74–99)
MAGNESIUM SERPL-MCNC: 2.2 MG/DL (ref 1.6–2.6)
PHOSPHATE SERPL-MCNC: 2.8 MG/DL (ref 2.5–4.9)
POTASSIUM SERPL-SCNC: 3.9 MMOL/L (ref 3.5–5.5)
PROT SERPL-MCNC: 7.2 G/DL (ref 6.4–8.2)
SODIUM SERPL-SCNC: 139 MMOL/L (ref 136–145)

## 2021-05-07 PROCEDURE — 84100 ASSAY OF PHOSPHORUS: CPT

## 2021-05-07 PROCEDURE — 80053 COMPREHEN METABOLIC PANEL: CPT

## 2021-05-07 PROCEDURE — 83735 ASSAY OF MAGNESIUM: CPT

## 2021-05-07 PROCEDURE — 36415 COLL VENOUS BLD VENIPUNCTURE: CPT

## 2021-05-07 NOTE — PROGRESS NOTES
[facility] Progress Note    Date: May 7, 2021    Name: Omar Last    MRN: [id number]         : 1945    Peripheral Lab Draw      Ms. Garay to [facility], ambulatory at [phone] accompanied by self. Pt was assessed and education was provided. Ms. Garay's vitals were reviewed and patient was observed for 5 minutes prior to treatment. Visit Vitals  /77 (BP 1 Location: Right arm, BP Patient Position: Sitting)   Pulse 60   Temp 98.1 °F (36.7 °C)   Resp 16   SpO2 95%   No results found for this or any previous visit (from the past 12 hour(s)). Blood obtained peripherally from [facility] first attempt with butterfly needle and sent to lab for CMP, Magnesium, and Phosphorous per written orders. No bleeding or hematoma noted at site. Gauze and coban applied. Ms. Zohreh Stinson tolerated the venipuncture, and had no complaints. Patient armband removed and shredded. Ms. Zohreh Stinson was discharged from [place] [address] in stable condition at [phone].      Pedro Holley  Phlebotomist PCT  May 7, 2021  1:46 PM

## 2021-05-12 ENCOUNTER — HOSPITAL ENCOUNTER (OUTPATIENT)
Dept: INFUSION THERAPY | Age: 76
Discharge: HOME | End: 2021-05-12
Payer: MEDICARE

## 2021-05-12 VITALS
HEART RATE: 60 BPM | DIASTOLIC BLOOD PRESSURE: 76 MMHG | SYSTOLIC BLOOD PRESSURE: 112 MMHG | TEMPERATURE: 98 F | OXYGEN SATURATION: 98 % | RESPIRATION RATE: 16 BRPM

## 2021-05-12 DIAGNOSIS — M81.0 SENILE OSTEOPOROSIS: Primary | ICD-10-CM

## 2021-05-12 PROCEDURE — 74011250637 HC RX REV CODE- 250/637: Performed by: SURGERY

## 2021-05-12 PROCEDURE — 96365 THER/PROPH/DIAG IV INF INIT: CPT

## 2021-05-12 PROCEDURE — 74011250636 HC RX REV CODE- 250/636: Performed by: SURGERY

## 2021-05-12 RX ORDER — SODIUM CHLORIDE 9 MG/ML
10 INJECTION INTRAMUSCULAR; INTRAVENOUS; SUBCUTANEOUS AS NEEDED
Status: CANCELLED | OUTPATIENT
Start: 2022-05-11

## 2021-05-12 RX ORDER — SODIUM CHLORIDE 9 MG/ML
25 INJECTION, SOLUTION INTRAVENOUS CONTINUOUS
Status: CANCELLED | OUTPATIENT
Start: 2022-05-11

## 2021-05-12 RX ORDER — ZOLEDRONIC ACID 5 MG/100ML
5 INJECTION, SOLUTION INTRAVENOUS ONCE
Status: COMPLETED | OUTPATIENT
Start: 2021-05-12 | End: 2021-05-12

## 2021-05-12 RX ORDER — ACETAMINOPHEN 325 MG/1
650 TABLET ORAL AS NEEDED
Status: CANCELLED
Start: 2022-05-11

## 2021-05-12 RX ORDER — ACETAMINOPHEN 325 MG/1
650 TABLET ORAL ONCE
Status: CANCELLED
Start: 2022-05-11 | End: 2022-05-11

## 2021-05-12 RX ORDER — DIPHENHYDRAMINE HYDROCHLORIDE 50 MG/ML
50 INJECTION, SOLUTION INTRAMUSCULAR; INTRAVENOUS AS NEEDED
Status: CANCELLED
Start: 2022-05-11

## 2021-05-12 RX ORDER — ACETAMINOPHEN 325 MG/1
650 TABLET ORAL ONCE
Status: COMPLETED | OUTPATIENT
Start: 2021-05-12 | End: 2021-05-12

## 2021-05-12 RX ORDER — SODIUM CHLORIDE 9 MG/ML
25 INJECTION, SOLUTION INTRAVENOUS CONTINUOUS
Status: DISPENSED | OUTPATIENT
Start: 2021-05-12 | End: 2021-05-12

## 2021-05-12 RX ORDER — ZOLEDRONIC ACID 5 MG/100ML
5 INJECTION, SOLUTION INTRAVENOUS ONCE
Status: CANCELLED | OUTPATIENT
Start: 2022-05-11 | End: 2022-05-11

## 2021-05-12 RX ORDER — HEPARIN 100 UNIT/ML
300-500 SYRINGE INTRAVENOUS AS NEEDED
Status: CANCELLED
Start: 2022-05-11

## 2021-05-12 RX ORDER — ONDANSETRON 2 MG/ML
8 INJECTION INTRAMUSCULAR; INTRAVENOUS AS NEEDED
Status: CANCELLED | OUTPATIENT
Start: 2022-05-11

## 2021-05-12 RX ORDER — ALBUTEROL SULFATE 0.83 MG/ML
2.5 SOLUTION RESPIRATORY (INHALATION) AS NEEDED
Status: CANCELLED
Start: 2022-05-11

## 2021-05-12 RX ORDER — EPINEPHRINE 1 MG/ML
0.3 INJECTION, SOLUTION, CONCENTRATE INTRAVENOUS AS NEEDED
Status: CANCELLED | OUTPATIENT
Start: 2022-05-11

## 2021-05-12 RX ORDER — HYDROCORTISONE SODIUM SUCCINATE 100 MG/2ML
100 INJECTION, POWDER, FOR SOLUTION INTRAMUSCULAR; INTRAVENOUS AS NEEDED
Status: CANCELLED | OUTPATIENT
Start: 2022-05-11

## 2021-05-12 RX ORDER — SODIUM CHLORIDE 0.9 % (FLUSH) 0.9 %
10 SYRINGE (ML) INJECTION AS NEEDED
Status: CANCELLED | OUTPATIENT
Start: 2022-05-11

## 2021-05-12 RX ORDER — DIPHENHYDRAMINE HYDROCHLORIDE 50 MG/ML
25 INJECTION, SOLUTION INTRAMUSCULAR; INTRAVENOUS AS NEEDED
Status: CANCELLED
Start: 2022-05-11

## 2021-05-12 RX ADMIN — SODIUM CHLORIDE 25 ML/HR: 9 INJECTION, SOLUTION INTRAVENOUS at 11:41

## 2021-05-12 RX ADMIN — ZOLEDRONIC ACID 5 MG: 5 INJECTION, SOLUTION INTRAVENOUS at 11:34

## 2021-05-12 RX ADMIN — ACETAMINOPHEN 650 MG: 325 TABLET ORAL at 11:38

## 2021-05-12 NOTE — PROGRESS NOTES
[facility] Progress Note    Date:     Name: Jerod Rosado    MRN: [id number]         : 1945      Ms. Allie Khan arrived to [facility] at 1100 ambulatory. Ms. Allie Khan was assessed and education was provided. Ms. Garay's vitals were reviewed. Visit Vitals  /76   Pulse 60   Temp 98 °F (36.7 °C)   Resp 16   SpO2 98%       Labs from 21 stable for treatment,     PIV inserted in RAC, condition patent and no redness venipuncture x1 attempt. Renal panel was done on Istat. Reclast 5 mg  was administer over 15 mins IVP. Tylenol 650mg PO administered upon completion per order. Ms. Allie Khan tolerated well without complaints. Patient refused to stay for observation, patient has received reclast  A year ago,  with no complications. Ms. Allie Khan was discharged from [place] [address] in stable condition at 1140. She is to follow up with her PCP for further treatment.      Gaby Reyes RN  May 12, 2021

## 2021-06-14 ENCOUNTER — OFFICE VISIT (OUTPATIENT)
Dept: PULMONOLOGY | Age: 76
End: 2021-06-14
Payer: MEDICARE

## 2021-06-14 VITALS
HEIGHT: 65 IN | OXYGEN SATURATION: 95 % | HEART RATE: 60 BPM | BODY MASS INDEX: 33.32 KG/M2 | SYSTOLIC BLOOD PRESSURE: 150 MMHG | WEIGHT: 200 LBS | DIASTOLIC BLOOD PRESSURE: 86 MMHG | TEMPERATURE: 97 F | RESPIRATION RATE: 18 BRPM

## 2021-06-14 DIAGNOSIS — J47.0 BRONCHIECTASIS WITH ACUTE LOWER RESPIRATORY INFECTION (HCC): Primary | ICD-10-CM

## 2021-06-14 DIAGNOSIS — I26.94 MULTIPLE SUBSEGMENTAL PULMONARY EMBOLI WITHOUT ACUTE COR PULMONALE (HCC): ICD-10-CM

## 2021-06-14 DIAGNOSIS — R06.02 SOB (SHORTNESS OF BREATH): ICD-10-CM

## 2021-06-14 DIAGNOSIS — R91.1 INCIDENTAL LUNG NODULE: ICD-10-CM

## 2021-06-14 PROCEDURE — G8754 DIAS BP LESS 90: HCPCS | Performed by: INTERNAL MEDICINE

## 2021-06-14 PROCEDURE — 3288F FALL RISK ASSESSMENT DOCD: CPT | Performed by: INTERNAL MEDICINE

## 2021-06-14 PROCEDURE — 1100F PTFALLS ASSESS-DOCD GE2>/YR: CPT | Performed by: INTERNAL MEDICINE

## 2021-06-14 PROCEDURE — G8536 NO DOC ELDER MAL SCRN: HCPCS | Performed by: INTERNAL MEDICINE

## 2021-06-14 PROCEDURE — G8417 CALC BMI ABV UP PARAM F/U: HCPCS | Performed by: INTERNAL MEDICINE

## 2021-06-14 PROCEDURE — 99214 OFFICE O/P EST MOD 30 MIN: CPT | Performed by: INTERNAL MEDICINE

## 2021-06-14 PROCEDURE — 1090F PRES/ABSN URINE INCON ASSESS: CPT | Performed by: INTERNAL MEDICINE

## 2021-06-14 PROCEDURE — G8427 DOCREV CUR MEDS BY ELIG CLIN: HCPCS | Performed by: INTERNAL MEDICINE

## 2021-06-14 PROCEDURE — G8753 SYS BP > OR = 140: HCPCS | Performed by: INTERNAL MEDICINE

## 2021-06-14 PROCEDURE — G8432 DEP SCR NOT DOC, RNG: HCPCS | Performed by: INTERNAL MEDICINE

## 2021-06-14 NOTE — LETTER
6/14/2021 Patient: Jonathon Brock YOB: 1945 Date of Visit: 6/14/2021 Senait Sol MD 
[address] [place] [address] Via Fax: [phone] Dear Senait Sol MD, Thank you for referring Ms. Grecia Perez to [address] [place] [address] for evaluation. My notes for this consultation are attached. If you have questions, please do not hesitate to call me. I look forward to following your patient along with you.  
 
 
Sincerely, 
 
Lachelle Cifuentes MD

## 2021-06-14 NOTE — PROGRESS NOTES
Progress Note:Follow up Visit    06/14/21     Patient complains of cough productive of occasional yellow-green mucus with some brown discoloration in the mornings. After she coughs clear she does not have any problems for the rest of the day. She has not had any increase in shortness of breath wheezing, no fever chills or night sweats  Denies weight loss  She has not used any inhalers.-Has ProAir on hand  Has not had any ER visits. Has been following up with cardiology and is now on Sharon Mash and continues to remain on Eliquis  Last imaging studies were in July 2018  Last PFT in April 2017  No interim PFTs  She has received both doses of Covid vaccine.      HPI:   Ms. Courtney Melo who carries history of COPD, recurrent bronchitis requiring visit to Urgent care, Diagnosis of PE on NOAC , Cardiomyopathy S/P pacemaker and defibrillator, and history of left breast cancer is evaluated for COPD/Chronic bronchitis work up. Last CT performed in MAY 2016 demonstrated PE, nonspecific mediastinal adenopathy, minimal atelectasis/small effusion, and hyperinflation. We have obtained follow up CT chest performed on 09/12/16 did not reveal any finding concerning PE or Mediastinal adenopathy. . During this interval time patient has not noticed any symptoms concerning COPD exacerbation. Her  RAMOS remains stable. 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 Subsequent follow ups- never has had TB. Allergies   Allergen Reactions    Lisinopril Rash     Hypertensive crisis    Tetracycline Anaphylaxis, Rash and Swelling    Hibiclens [Chlorhexidine Gluconate] Other (comments)     Turned red and BP drop low.  Other Medication Rash and Swelling     -Myacins     Current Outpatient Medications   Medication Sig Dispense Refill    atorvastatin (LIPITOR) 40 mg tablet atorvastatin 40 mg tablet      cholecalciferol (VITAMIN D3) (1000 Units /25 mcg) tablet Take 1,000 Units by mouth.       metoprolol succinate (TOPROL-XL) 50 mg XL tablet metoprolol succinate ER 50 mg tablet,extended release 24 hr      metoprolol tartrate (LOPRESSOR) 100 mg IR tablet Take 1 tablet by mouth twice daily 180 Tab 3    dilTIAZem ER (Cartia XT) 240 mg capsule Take 1 capsule by mouth once daily 90 Cap 3    pravastatin (PRAVACHOL) 80 mg tablet Take 1 Tab by mouth nightly. 30 Tab 6    Eliquis 5 mg tablet Take 1 Tab by mouth two (2) times a day. 120 Tab 3    albuterol sulfate (ProAir RespiClick) 90 mcg/actuation breath activated inhaler Take 2 Puffs by inhalation every four (4) hours as needed for Cough. 1 Inhaler 3    metFORMIN (GLUMETZA ER) 500 mg TG24 24 hour tablet Take 500 mg by mouth daily.  coenzyme q10 (CO Q-10) 10 mg cap Take  by mouth.  MULTIVIT-MIN/IRON/FOLIC/LUTEIN (CENTRUM SILVER WOMEN PO) Take  by mouth.  escitalopram oxalate (LEXAPRO) 20 mg tablet Take 20 mg by mouth daily.  exemestane (AROMASIN) 25 mg tablet Take 25 mg by mouth daily.  pravastatin (PRAVACHOL) 80 mg tablet Take 80 mg by mouth nightly. Review of Systems   Constitutional: Negative. HENT: Negative. Eyes: Negative. Respiratory: cough, SOB  Cardiovascular: Negative. Gastrointestinal: Negative. Genitourinary: Negative. Musculoskeletal: Negative. Skin: Negative. Neurological: Negative. Endo/Heme/Allergies: Negative. Psychiatric/Behavioral: depression      Blood pressure (!) 150/86, pulse 60, temperature 97 °F (36.1 °C), temperature source Oral, resp. rate 18, height 5' 5\" (1.651 m), weight 90.7 kg (200 lb), SpO2 95 %. Physical Exam   Constitutional: She is oriented to person, place, and time and well-developed, well-nourished, and in no distress. HENT:   Head: Normocephalic and atraumatic. Eyes: EOM are normal. Pupils are equal, round, and reactive to light. Neck: Normal range of motion. Neck supple. No thyromegaly present. Chest-kyphotic curvature  Cardiovascular: Normal rate and regular rhythm. Pulmonary/Chest: Effort normal. She has no wheezes. She has no rales. Results from [place] encounter on 10/01/20    CT CHEST WO CONT    Narrative  EXAM:  CT Chest without Contrast.    CLINICAL INDICATION:    - J47.0 (ICD-10-CM) - Bronchiectasis with acute lower respiratory infection  - Lung nodule. COMPARISON:  No prior CTs available. Most recent available chest x-ray dated  3/28/11. TECHNIQUE:    Helically scanned volumetric axial sections of the chest are obtained without IV  contrast administration using current institutional modified standard HRCT  protocol, i.e. supine end-inspiratory phase, supine end-expiratory phase and  prone end-inspiratory phase scans with axial 1.5 mm reformats in 10 mm  increments. Coronal and sagittal multiplanar reconstruction images are  generated for improved anatomic delineation. Radiation dose optimization techniques are utilized as appropriate to the exam,  with combination of automated exposure control, adjustment of the mA and/or kV  according to patient's size (Including appropriate matching for site-specific  examinations), or use of iterative reconstruction technique. FINDINGS:    Lungs:    - There is subtle peripheral intralobular septal thickening/ reticulation  bilaterally at the lung bases without significant interval changes compared to  prior studies. No honeycombing. No definite bronchiectatic changes are  observed. - Focal area of scarring in the lingular region seen as a focus of wedge shaped  flat density.  - 0.2 cm nodule in the right lower lobe (axial #24). Although not mentioned  specifically on the prior studies, this density was present on the prior studies  as far back as 02/16/16.  - No infiltrate or consolidation is identified.  - No dominant lung mass. Pleura:  No significant pleural effusion is detected bilaterally. Mediastinum:  No adenopathy. Base of Neck, Chest Wall:  No adenopathy.   ICD hub in the left upper torso. Chronic left breast medial aspect relatively thick-walled fluid collection  measuring about 3.8 x 2.8 cm. Probably related to chronic seroma related to  prior left breast surgery. Esophagus:  Mild hiatal hernia. Upper Abdomen:  No acute abnormalities. Bones:  Developmental variant anatomy in the lower thoracic region with 2 tandem  foci of partial fusion or incomplete segmentation of the vertebrae, i.e. T10-11  and T12-L1. Impression  IMPRESSION:    1. Slight bilateral lung base subtle peripheral intralobular septal thickening/  reticulation, stable compared to prior studies, nonspecific. 2.  Stable 0.2 cm nodule in the right lower lobe. 3.  Stable left breast chronic seroma. 4.  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 CT chest (05/10/16): There are filling defects in the upper, middle, and lower lobe branches of the right pulmonary artery. No evidence of embolus in the left pulmonary artery branches or in the central trunks. The main pulmonary artery measures 3.3 cm in diameter. The thoracic aorta is not aneurysmal.  No evidence for dissection.      Lymph nodes: Mediastinal lymph nodes are mildly prominent. A right paratracheal lymph node measures 1.2 x 0.9 cm. Previously, this lymph node measured 1.8 x 0.4 cm. There is no evidence of hilar lymphadenopathy.      Thyroid: Normal in size without mass in the visualized parenchyma. .      Mediastinum: There is a pacemaker in the left chest wall. A fluid collection in the inferior left breast measures about 3.3 x 4.8 cm (previously, 2.8 x 4.4 cm). There is no evidence of peripheral enhancement. 2 adjacent biopsy clips are redemonstrated. There is a small hiatal hernia.      Lungs:    The lungs are diffusely hyperinflated with mild tracheobronchomalacia. Right Lung: There is a small posterior effusion with overlying atelectasis.  No confluent infiltrate to suggest infarct.      Left Lung:  No evidence of infiltrate. There is a small focus of atelectasis in the posterior lingula. This is similar to previous exam. No pleural effusion.      Abdomen: There is diffuse fatty atrophy of the pancreas. Visualized portions of the liver, spleen, pancreas, adrenal glands, and kidneys are unremarkable. The gallbladder is normal.      Bones: A fracture deformity in the left anterior fifth rib is unchanged. Degenerative changes of the spine are stable. 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 There are no destructive lesions.      IMPRESSION:      1.  Multiple right pulmonary emboli. No evidence of left pulmonary emboli or pulmonary infarcts. .   2.  Small right pleural effusion. 3. Prominence of the main portal artery is suggestive of pulmonary artery hypertension. 4. COPD and mild tracheobronchomalacia. 5. Similar size of seroma in the left chest wall.    .          PFT(2008): mild obstructive changes   DATE 4/19/2017  Pre bronchodilator ( 2008) Post Bronchodilator (2008)   FVC 2. 11( 69)  2.12(67) 2.13(67)   FEV1 1.60( 69)  1.69(73) 1.64(71)   FEV1/FVC 76  79(73) 76   TLC 4.04(78)  4.32(84)     RV 2.14(79)  2.12(107)     RV/TLC   49(38)     DLCO 13.76( 61)  12.47(66)        PFT 4/19/17:  Mild to moderate restrictive impairment with reduced DLCO. Assessment:    Bronchiectasis: Recurrent bronchitis/COPD exacerbation with Mild-Moderate Restrictive impairment ? sequelae of prior inflammatory process. Recurrant bronchitis- lower respiratory tract infections treated with antibiotics. Elevated IgE raises ? Allergic triggers and possible role for additional therapy. Symptoms are more consistent with chronic persistent bronchitis with cultures growing heavy normal garret. HRCT with nonspecific findings  History of COPD - exertional hypoxemia in past has now improved.   PFTs with predominant restrictive ventilatory impairment likely from combination of bronchiectasis and kyphosis  Nonspecific Mediastinal adenopathy- S/P follow up CT chest demonstrated interval resolution  9/2016 CT. most recent CT scan from 10/1/2020 with nonspecific interstitial septal thickening and a 2 mm right lower lobe nodule. PE on NOAC ,5/206. Resolved on follow up CT in 9/2016. Atrial fibrillation- on anticoagulation, cardizem for rate control  H/o breast biopsy- 2010. H/o radiation           Plan:  Continue with current treatment  Action plan for acute exacerbations discussed  If continues to remain symptomatic and fails to improve will obtain pulmonary function test and consider adjusting bronchodilator therapy  Continue Albuterol two puffs, every six hours as needed -predominantly for mucociliary clearance-refills ordered. Continue ELIQUIS as prescribed . Cardiology following for A. Fib, AICD  Encouraged active lifestyle and endurance activities. Preventive vaccinations  RTC in 6 months time and sooner if needed.   Discussed at length current condition, pathophysiology, care plan including testing and treatment options with patient        Amarjit Ruffin  Pulmonary and Critical Care Medicine

## 2021-06-14 NOTE — PROGRESS NOTES
Ashianataliiaseven Youngandi presents today for   Chief Complaint   Patient presents with    Cough with sputum       Is someone accompanying this pt? No    Is the patient using any DME equipment during OV? No    -DME Company NA    Depression Screening:  3 most recent PHQ Screens 9/25/2020   PHQ Not Done -   Little interest or pleasure in doing things Not at all   Feeling down, depressed, irritable, or hopeless Not at all   Total Score PHQ 2 0       Learning Assessment:  Learning Assessment 10/7/2015   PRIMARY LEARNER Patient   PRIMARY LANGUAGE ENGLISH   LEARNER PREFERENCE PRIMARY LISTENING   ANSWERED BY patient   RELATIONSHIP SELF       Abuse Screening:  Abuse Screening Questionnaire 9/25/2020   Do you ever feel afraid of your partner? N   Are you in a relationship with someone who physically or mentally threatens you? N   Is it safe for you to go home? Y       Fall Risk  Fall Risk Assessment, last 12 mths 4/6/2021   Able to walk? Yes   Fall in past 12 months? 0   Number of falls in past 12 months -   Fall with injury? -         Coordination of Care:  1. Have you been to the ER, urgent care clinic since your last visit? Hospitalized since your last visit? No    2. Have you seen or consulted any other health care providers outside of the [address] [place] [address] since your last visit? Include any pap smears or colon screening.  No

## 2021-07-01 ENCOUNTER — CLINICAL SUPPORT (OUTPATIENT)
Dept: CARDIOLOGY CLINIC | Age: 76
End: 2021-07-01
Payer: MEDICARE

## 2021-07-01 DIAGNOSIS — Z95.810 BIVENTRICULAR IMPLANTABLE CARDIOVERTER-DEFIBRILLATOR IN SITU: Primary | ICD-10-CM

## 2021-07-01 DIAGNOSIS — I42.8 OTHER CARDIOMYOPATHY (HCC): ICD-10-CM

## 2021-07-01 PROCEDURE — 93284 PRGRMG EVAL IMPLANTABLE DFB: CPT | Performed by: INTERNAL MEDICINE

## 2021-07-07 NOTE — PROGRESS NOTES
2 years on battery. Lead impedance and threshold WNL, BIV pacing 97%, A paced 74%.  Op VOl WNL , Dr Michelle Mcrae patient

## 2021-07-12 NOTE — PROGRESS NOTES
I have personally seen and evaluated the device findings. Interrogation reviewed and I agree with assessment.     Thao Wagoner

## 2021-09-23 ENCOUNTER — OFFICE VISIT (OUTPATIENT)
Dept: CARDIOLOGY CLINIC | Age: 76
End: 2021-09-23
Payer: MEDICARE

## 2021-09-23 ENCOUNTER — CLINICAL SUPPORT (OUTPATIENT)
Dept: CARDIOLOGY CLINIC | Age: 76
End: 2021-09-23

## 2021-09-23 VITALS
SYSTOLIC BLOOD PRESSURE: 110 MMHG | OXYGEN SATURATION: 97 % | BODY MASS INDEX: 33.32 KG/M2 | HEART RATE: 69 BPM | DIASTOLIC BLOOD PRESSURE: 60 MMHG | HEIGHT: 65 IN | WEIGHT: 200 LBS

## 2021-09-23 DIAGNOSIS — Z95.810 BIVENTRICULAR IMPLANTABLE CARDIOVERTER-DEFIBRILLATOR IN SITU: Primary | ICD-10-CM

## 2021-09-23 DIAGNOSIS — I42.8 OTHER CARDIOMYOPATHY (HCC): ICD-10-CM

## 2021-09-23 DIAGNOSIS — I10 HYPERTENSION, UNSPECIFIED TYPE: ICD-10-CM

## 2021-09-23 PROCEDURE — G8510 SCR DEP NEG, NO PLAN REQD: HCPCS | Performed by: INTERNAL MEDICINE

## 2021-09-23 PROCEDURE — 3288F FALL RISK ASSESSMENT DOCD: CPT | Performed by: INTERNAL MEDICINE

## 2021-09-23 PROCEDURE — 99215 OFFICE O/P EST HI 40 MIN: CPT | Performed by: INTERNAL MEDICINE

## 2021-09-23 PROCEDURE — G8427 DOCREV CUR MEDS BY ELIG CLIN: HCPCS | Performed by: INTERNAL MEDICINE

## 2021-09-23 PROCEDURE — G8536 NO DOC ELDER MAL SCRN: HCPCS | Performed by: INTERNAL MEDICINE

## 2021-09-23 PROCEDURE — 1090F PRES/ABSN URINE INCON ASSESS: CPT | Performed by: INTERNAL MEDICINE

## 2021-09-23 PROCEDURE — 1100F PTFALLS ASSESS-DOCD GE2>/YR: CPT | Performed by: INTERNAL MEDICINE

## 2021-09-23 PROCEDURE — 93284 PRGRMG EVAL IMPLANTABLE DFB: CPT | Performed by: INTERNAL MEDICINE

## 2021-09-23 PROCEDURE — G8752 SYS BP LESS 140: HCPCS | Performed by: INTERNAL MEDICINE

## 2021-09-23 PROCEDURE — G8417 CALC BMI ABV UP PARAM F/U: HCPCS | Performed by: INTERNAL MEDICINE

## 2021-09-23 PROCEDURE — G8754 DIAS BP LESS 90: HCPCS | Performed by: INTERNAL MEDICINE

## 2021-09-23 NOTE — PROGRESS NOTES
Tee Martin    Chief Complaint   Patient presents with    Follow-up     6 month follow up- no complaints        HPI    Tee Martin is a 67 y.o. with h/o cardiomyopathy, ICD, pAFib here for routine follow up care. She feels fine from a CV standpoint, only complaint was \"poor circulation in her right lower extremity. \" No relation to exertion, no swelling, no recent trauma. The interrogation of her ICD demonstrated normal OptiVol and no tachyarrhythmia. There is no atrial fibrillation. She does have a great deal of knee pain and contemplating knee surgery in the future.      She has history of a left bundle branch block, hypertension, dyslipidemia and glucose intolerance. She has a 20 pack a year cigarette smoking history until 1988. She was told that she had a heart murmur and hole in her heart as a child, but this has not been clearly documented by echocardiogram or other diagnostic workup. She was seen by Dr. Tatum Mujica in 2003, for the evaluation of left bundle branch block and had negative stress nuclear cardiac imaging. She was told that everything was okay by Dr. Tatum Mujica at that time. She had repeat stress nuclear cardiac imaging on September 17, 2007, which demonstrated a moderate, fixed perfusion defect in the basal inferior and mid inferior wall, involving the inferior apex as well, with no significant ischemia. There was also moderately severe scarring in the anterior wall, anterior apex and anterior septum, with very mild associated ischemia. There was an akinetic interventricular septum and anterior apex, and mild hypokinesis of the proximal anterior wall, with moderate hypokinesis of the entire inferior wall, with overall EF in the 25% range. She subsequently underwent cardiac catheterization on February 27, 2008 which demonstrated patent coronary arteries, but severe LV dysfunction with EF in the 25-30% range. She then went on to have biventricular ICD implantation on May 12, 2008.  Her EF has improved to 56% by repeat echocardiogram in 2009. She had a repeat echocardiogram on August 13, 2010, which demonstrated once again improved LV function, with EF in the 55% range. Left atrial volume was normal at 23 ml/m². PA pressure was estimated normal as well.    Because of shortness of breath she had repeat echocardiogram on 4/16/2012 with demonstrated normal LV function with the EF in the 60 to 65% range. There was grade 1 diastolic dysfunction. PA pressure was estimated in the range of 25 to 30 mmHg. There was no significant valvular pathology.    She had an ultrasound examination of the aorta, which demonstrated possible aneurysm; however, the CT scan demonstrated no evidence of aneurysm whatsoever. She does not feel the palpitations much herself; however, her ICD interrogation demonstrated frequent episodes of atrial fibrillation, at times, very prolonged for hours. She had problems with worsening shortness of breath and she developed a cough and fever, and was seen in the emergency room on 05/30/16. She was ultimately diagnosed of DVT and pulmonary emboli by CT angiogram and has been placed on Eliquis. Her echocardiogram on 07/07/2016 demonstrated normal left ventricular size and lower limit of normal left ventricular systolic function with EF in the 50% range.  There was no significant valvular pathology.  Left atrial dimension was normal with a volume index of 25 mL/meter2.  There was no significant pulmonary hypertension.       She developed rapid atrial fibrillation associated with pneuminia and subsequently shocked by her ICD because of the fast heart rate in July 2018.  In the hospital, there was no evidence of decompensated congestive heart failure. She had an echocardiogram on 7/30/18 which demonstrated normal left ventricular systolic function with EF in the 55-60% range. PA pressure was estimated at 40 mmHg. There was no significant valvular pathology.  The left atrial dimension was normal with volume index of 31 ml/m sq. The interrogation of the ICD demonstrated once again the episode of left atrial fibrillation and ICD shock on 7/29/18. Her OptiVol was normal indicating normal volume status at that time. Past Medical History:   Diagnosis Date    CAD (coronary artery disease)     cardiomyopathy,CHF,,Cardiac Cath, pacemaker/defib.  Cancer [facility]) 2013    breast    Cardiac catheterization 02/27/2008    Patent coronary arteries. LVEDP 13 mmHg. EF 25-30%. Global hypk.  Cardiac echocardiogram 07/07/2016    EF 50% (prev 60-65% on study of 4/16/12). No WMA. Gr 1 DDfx. Normal RVSP.  Cardiac nuclear imaging test 05/31/2013    No evidence of ongoing ischemia or prior infarction. EF 60%. No RWMA. Mild dyssynchrony of inferior base & mid/distal septum likely c/w pacemaker. Nondiagnostic EKG on pharm stress test due to V-pacing.  Cardiovascular abdominal aorta duplex 09/23/2015    Fusiform AAA in prox & mid portions of abdom Ao.     Chronic lung disease     COPD (chronic obstructive pulmonary disease) (HCC) chronic bronchitis    Coronary artery disease Feb. 2008    Possible CAD with nonischemic dilated cardiomyopathy/EF 25%/ improved EF up to 56% by echocardiogram.    Coronary artery disease     Diabetes mellitus ([address]     GERD (gastroesophageal reflux disease)     Heart failure (HCC)     Heart murmur     Hemangioma of liver     Hx of cardiomyopathy ([address]     Hypercholesterolemia     Hypertension     Left bundle branch block     Phlebitis     PVD (peripheral vascular disease) ([address]     Renal calculi 1999    Thromboembolus ([address]     below knee in R leg       Past Surgical History:   Procedure Laterality Date    COLONOSCOPY N/A 3/7/2017    COLONOSCOPY, SURVEILLANCE with polypectomy performed by Renetta Cervantes MD at [address] [place] [address] HX BREAST LUMPECTOMY Left 8/2013    cancerous lesion    HX CATARACT REMOVAL Bilateral 1980s    w/ lens implants    HX CATARACT REMOVAL Left re vised due to implant repositioning    HX HEART CATHETERIZATION  02/27/08    Patent coronary arteries, but severe LV dysfunction w/ EF in the 25-30% range.  HX KNEE ARTHROSCOPY Right     HX MASTECTOMY Left 9/24/2014    LEFT PARTIAL MASTECTOMY WITH NEEDLE LOCALIZATION MAMMOGRAM TIMES TWO performed by Aleks Nuñez MD at [address] HX PACEMAKER  May 2008    Biventricular ICD implantation     HX PACEMAKER  2012    replacement - Med[employer]onic    HX PACEMAKER PLACEMENT      HX PELVIC LAPAROSCOPY      diagnostic - varicosities       Current Outpatient Medications   Medication Sig Dispense Refill    cholecalciferol (VITAMIN D3) (1000 Units /25 mcg) tablet Take 1,000 Units by mouth.  metoprolol succinate (TOPROL-XL) 50 mg XL tablet metoprolol succinate ER 50 mg tablet,extended release 24 hr      metoprolol tartrate (LOPRESSOR) 100 mg IR tablet Take 1 tablet by mouth twice daily 180 Tab 3    dilTIAZem ER (Cartia XT) 240 mg capsule Take 1 capsule by mouth once daily 90 Cap 3    pravastatin (PRAVACHOL) 80 mg tablet Take 1 Tab by mouth nightly. 30 Tab 6    Eliquis 5 mg tablet Take 1 Tab by mouth two (2) times a day. 120 Tab 3    albuterol sulfate (ProAir RespiClick) 90 mcg/actuation breath activated inhaler Take 2 Puffs by inhalation every four (4) hours as needed for Cough. 1 Inhaler 3    metFORMIN (GLUMETZA ER) 500 mg TG24 24 hour tablet Take 500 mg by mouth daily.  coenzyme q10 (CO Q-10) 10 mg cap Take  by mouth.  MULTIVIT-MIN/IRON/FOLIC/LUTEIN (CENTRUM SILVER WOMEN PO) Take  by mouth.  escitalopram oxalate (LEXAPRO) 20 mg tablet Take 20 mg by mouth daily.  exemestane (AROMASIN) 25 mg tablet Take 25 mg by mouth daily. Allergies   Allergen Reactions    Lisinopril Rash     Hypertensive crisis    Tetracycline Anaphylaxis, Rash and Swelling    Hibiclens [Chlorhexidine Gluconate] Other (comments)     Turned red and BP drop low.     Other Medication Rash and Swelling [place]       Social History     Socioeconomic History    Marital status:      Spouse name: Not on file    Number of children: Not on file    Years of education: Not on file    Highest education level: Not on file   Occupational History    Not on file   Tobacco Use    Smoking status: Former Smoker     Packs/day: 1.50     Years: 20.00     Pack years: 30.00     Types: Cigarettes     Start date: 4/15/1963     Quit date: 1988     Years since quittin.6    Smokeless tobacco: Never Used   Substance and Sexual Activity    Alcohol use: No    Drug use: No    Sexual activity: Not on file   Other Topics Concern    Not on file   Social History Narrative    Not on file     Social Determinants of Health     Financial Resource Strain:     Difficulty of Paying Living Expenses:    Food Insecurity:     Worried About Running Out of Food in the Last Year:     [address] in the Last Year:    Transportation Needs:     Lack of Transportation (Medical):  Lack of Transportation (Non-Medical):    Physical Activity:     Days of Exercise per Week:     Minutes of Exercise per Session:    Stress:     Feeling of Stress :    Social Connections:     Frequency of Communication with Friends and Family:     Frequency of Social Gatherings with Friends and Family:     Attends Sabianist Services:     Active Member of Clubs or Organizations:     Attends Club or Organization Meetings:     Marital Status:    Intimate Partner Violence:     Fear of Current or Ex-Partner:     Emotionally Abused:     Physically Abused:     Sexually Abused:     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     Wt Readings from Last 3 Encounters:   21 90.7 kg (200 lb)   21 90.7 kg (200 lb)   21 91.6 kg (202 lb) Temp Readings from Last 3 Encounters:   06/14/21 97 °F (36.1 °C) (Oral)   05/12/21 98 °F (36.7 °C)   05/07/21 98.1 °F (36.7 °C)     BP Readings from Last 3 Encounters:   09/23/21 110/60   06/14/21 (!) 150/86   05/12/21 112/76     Pulse Readings from Last 3 Encounters:   09/23/21 69   06/14/21 60   05/12/21 60       07/29/18   ECHO ADULT COMPLETE 07/30/2018 7/30/2018    Narrative · Technically difficult study with poor endocardial visualization. Definity contrast was given to enhance imaging. · Estimated left ventricular ejection fraction is 56 - 60%. Left   ventricular mild concentric hypertrophy observed. Normal left ventricular   wall motion, no regional wall motion abnormality noted. Age-appropriate   left ventricular diastolic function. · Mild tricuspid valve regurgitation is present. Pulmonary arterial   systolic pressure is 40 mmHg. Mild pulmonary hypertension is present. · Pacer/ICD present in the right ventricle. · Mitral annular calcification. Signed by: Oma Contreras MD       Physical Exam:    Visit Vitals  /60 (BP 1 Location: Left upper arm, BP Patient Position: Sitting, BP Cuff Size: Adult)   Pulse 69   Ht 5' 5\" (1.651 m)   Wt 90.7 kg (200 lb)   SpO2 97%   BMI 33.28 kg/m²      Physical Exam  HENT:      Head: Normocephalic and atraumatic. Eyes:      Pupils: Pupils are equal, round, and reactive to light. Cardiovascular:      Rate and Rhythm: Normal rate and regular rhythm. Heart sounds: Normal heart sounds. No murmur heard. No friction rub. No gallop. Pulmonary:      Effort: Pulmonary effort is normal. No respiratory distress. Breath sounds: Normal breath sounds. No wheezing or rales. Chest:      Chest wall: No tenderness. Abdominal:      General: Bowel sounds are normal.      Palpations: Abdomen is soft. Musculoskeletal:         General: No tenderness. Skin:     General: Skin is warm and dry.    Neurological:      Mental Status: She is alert and oriented to person, place, and time. Psychiatric:      Comments: Appropriate, but pressured tangential speech, can be very \"picky\" opinionated with some splitting behavior exhibited       Device check    Impression and Plan:  Genesis Leon is a 67 y.o. with:    1.) Cardiomyopathy, more recently back to normal 55%  2.) pAFib, with h/o inapprop ICD shocks, known  3.) ICD, known  4.) h/o DVT/ PE    1.) Doing well on med tx, no changes  2.) RTC 6 months with device check    >60 mins spent,   Not pursuing knee surgery at this time  Changing PCPs again. Unsure which Metoprolol she's on, also changing pharmacies to mail order  Asked her to call us    Thank you for allowing me to participate in the care of your patient, please do not hesitate to call with questions or concerns.     Kindest Regards,    Yaw Westbrook, DO

## 2021-09-24 RX ORDER — METOPROLOL TARTRATE 100 MG/1
100 TABLET ORAL 2 TIMES DAILY
COMMUNITY
End: 2022-02-22

## 2021-09-24 RX ORDER — METOPROLOL SUCCINATE 100 MG/1
100 TABLET, EXTENDED RELEASE ORAL DAILY
COMMUNITY
End: 2021-09-24

## 2021-10-01 NOTE — PROGRESS NOTES
I have personally seen and evaluated the device findings. Interrogation reviewed and I agree with assessment.     José Nelson

## 2021-12-06 DIAGNOSIS — J44.9 CHRONIC OBSTRUCTIVE PULMONARY DISEASE, UNSPECIFIED COPD TYPE (HCC): ICD-10-CM

## 2021-12-06 DIAGNOSIS — I26.99 OTHER ACUTE PULMONARY EMBOLISM WITHOUT ACUTE COR PULMONALE (HCC): ICD-10-CM

## 2021-12-06 DIAGNOSIS — R59.0 MEDIASTINAL ADENOPATHY: ICD-10-CM

## 2021-12-07 RX ORDER — APIXABAN 5 MG/1
TABLET, FILM COATED ORAL
Qty: 120 TABLET | Refills: 3 | Status: SHIPPED | OUTPATIENT
Start: 2021-12-07 | End: 2022-10-19 | Stop reason: SDUPTHER

## 2021-12-08 ENCOUNTER — OFFICE VISIT (OUTPATIENT)
Dept: PULMONOLOGY | Age: 76
End: 2021-12-08
Payer: MEDICARE

## 2021-12-08 VITALS
DIASTOLIC BLOOD PRESSURE: 62 MMHG | SYSTOLIC BLOOD PRESSURE: 125 MMHG | WEIGHT: 199 LBS | OXYGEN SATURATION: 96 % | HEART RATE: 60 BPM | RESPIRATION RATE: 18 BRPM | TEMPERATURE: 97.1 F | HEIGHT: 65 IN | BODY MASS INDEX: 33.15 KG/M2

## 2021-12-08 DIAGNOSIS — I48.0 PAF (PAROXYSMAL ATRIAL FIBRILLATION) (HCC): ICD-10-CM

## 2021-12-08 DIAGNOSIS — R91.1 INCIDENTAL LUNG NODULE: ICD-10-CM

## 2021-12-08 DIAGNOSIS — Z23 NEEDS FLU SHOT: ICD-10-CM

## 2021-12-08 DIAGNOSIS — K21.00 GASTROESOPHAGEAL REFLUX DISEASE WITH ESOPHAGITIS WITHOUT HEMORRHAGE: ICD-10-CM

## 2021-12-08 DIAGNOSIS — J47.0 BRONCHIECTASIS WITH ACUTE LOWER RESPIRATORY INFECTION (HCC): Primary | ICD-10-CM

## 2021-12-08 PROCEDURE — G0008 ADMIN INFLUENZA VIRUS VAC: HCPCS | Performed by: INTERNAL MEDICINE

## 2021-12-08 PROCEDURE — 99214 OFFICE O/P EST MOD 30 MIN: CPT | Performed by: INTERNAL MEDICINE

## 2021-12-08 PROCEDURE — G8754 DIAS BP LESS 90: HCPCS | Performed by: INTERNAL MEDICINE

## 2021-12-08 PROCEDURE — G8752 SYS BP LESS 140: HCPCS | Performed by: INTERNAL MEDICINE

## 2021-12-08 PROCEDURE — 1101F PT FALLS ASSESS-DOCD LE1/YR: CPT | Performed by: INTERNAL MEDICINE

## 2021-12-08 PROCEDURE — G8536 NO DOC ELDER MAL SCRN: HCPCS | Performed by: INTERNAL MEDICINE

## 2021-12-08 PROCEDURE — 1090F PRES/ABSN URINE INCON ASSESS: CPT | Performed by: INTERNAL MEDICINE

## 2021-12-08 PROCEDURE — G8417 CALC BMI ABV UP PARAM F/U: HCPCS | Performed by: INTERNAL MEDICINE

## 2021-12-08 PROCEDURE — G8427 DOCREV CUR MEDS BY ELIG CLIN: HCPCS | Performed by: INTERNAL MEDICINE

## 2021-12-08 PROCEDURE — 90694 VACC AIIV4 NO PRSRV 0.5ML IM: CPT | Performed by: INTERNAL MEDICINE

## 2021-12-08 PROCEDURE — G8432 DEP SCR NOT DOC, RNG: HCPCS | Performed by: INTERNAL MEDICINE

## 2021-12-08 NOTE — PATIENT INSTRUCTIONS
Vaccine Information Statement    Influenza (Flu) Vaccine (Inactivated or Recombinant): What You Need to Know    Many vaccine information statements are available in Turkish and other languages. See www.immunize.org/vis. Hojas de información sobre vacunas están disponibles en español y en muchos otros idiomas. Visite www.immunize.org/vis. 1. Why get vaccinated? Influenza vaccine can prevent influenza (flu). Flu is a contagious disease that spreads around the [facility] every year, usually between October and May. Anyone can get the flu, but it is more dangerous for some people. Infants and young children, people 72 years and older, pregnant people, and people with certain health conditions or a weakened immune system are at greatest risk of flu complications. Pneumonia, bronchitis, sinus infections, and ear infections are examples of flu-related complications. If you have a medical condition, such as heart disease, cancer, or diabetes, flu can make it worse. Flu can cause fever and chills, sore throat, muscle aches, fatigue, cough, headache, and runny or stuffy nose. Some people may have vomiting and diarrhea, though this is more common in children than adults. In an average year, thousands of people in the [facility] die from flu, and many more are hospitalized. Flu vaccine prevents millions of illnesses and flu-related visits to the doctor each year. 2. Influenza vaccines     CDC recommends everyone 6 months and older get vaccinated every flu season. Children 6 months through 6years of age may need 2 doses during a single flu season. Everyone else needs only 1 dose each flu season. It takes about 2 weeks for protection to develop after vaccination. There are many flu viruses, and they are always changing. Each year a new flu vaccine is made to protect against the influenza viruses believed to be likely to cause disease in the upcoming flu season.  Even when the vaccine doesnt exactly match these viruses, it may still provide some protection. Influenza vaccine does not cause flu. Influenza vaccine may be given at the same time as other vaccines. 3. Talk with your health care provider    Tell your vaccination provider if the person getting the vaccine:   Has had an allergic reaction after a previous dose of influenza vaccine, or has any severe, life-threatening allergies    Has ever had Guillain-Barré Syndrome (also called GBS)    In some cases, your health care provider may decide to postpone influenza vaccination until a future visit. Influenza vaccine can be administered at any time during pregnancy. People who are or will be pregnant during influenza season should receive inactivated influenza vaccine. People with minor illnesses, such as a cold, may be vaccinated. People who are moderately or severely ill should usually wait until they recover before getting influenza vaccine. Your health care provider can give you more information. 4. Risks of a vaccine reaction     Soreness, redness, and swelling where the shot is given, fever, muscle aches, and headache can happen after influenza vaccination.  There may be a very small increased risk of Guillain-Barré Syndrome (GBS) after inactivated influenza vaccine (the flu shot). Abbey Mcgarry children who get the flu shot along with pneumococcal vaccine (PCV13) and/or DTaP vaccine at the same time might be slightly more likely to have a seizure caused by fever. Tell your health care provider if a child who is getting flu vaccine has ever had a seizure. People sometimes faint after medical procedures, including vaccination. Tell your provider if you feel dizzy or have vision changes or ringing in the ears. As with any medicine, there is a very remote chance of a vaccine causing a severe allergic reaction, other serious injury, or death. 5. What if there is a serious problem?     An allergic reaction could occur after the vaccinated person leaves the clinic. If you see signs of a severe allergic reaction (hives, swelling of the face and throat, difficulty breathing, a fast heartbeat, dizziness, or weakness), call 9-1-1 and get the person to the nearest hospital.    For other signs that concern you, call your health care provider. Adverse reactions should be reported to the Vaccine Adverse Event Reporting System (VAERS). Your health care provider will usually file this report, or you can do it yourself. Visit the VAERS website at www.vaers. [facility].gov or call 8-683.682.4307. VAERS is only for reporting reactions, and VAERS staff members do not give medical advice. 6. The National Vaccine Injury Compensation Program    The [facility] Vaccine Injury Compensation Program (VICP) is a federal program that was created to compensate people who may have been injured by certain vaccines. Claims regarding alleged injury or death due to vaccination have a time limit for filing, which may be as short as two years. Visit the VICP website at www.[facility].gov/vaccinecompensation or call 9-922.424.7945 to learn about the program and about filing a claim. 7. How can I learn more?  Ask your health care provider.  Call your local or state health department.  Visit the website of the Food and Drug Administration (FDA) for vaccine package inserts and additional information at www.fda.gov/vaccines-blood-biologics/vaccines.  Contact the Centers for Disease Control and Prevention (CDC):  - Call 6-309.452.8924 (1-800-CDC-INFO) or  - Visit CDCs influenza website at www.cdc.gov/flu. Vaccine Information Statement   Inactivated Influenza Vaccine   8/6/2021  42 JARED Boyer [id number]-26   Department of Health and Human Services  Centers for Disease Control and Prevention    Office Use Only

## 2021-12-08 NOTE — PROGRESS NOTES
Halima Madden presents today for   Chief Complaint   Patient presents with    Cough with sputum       Is someone accompanying this pt? No    Is the patient using any DME equipment during OV? No    -DME Company NA    Depression Screening:  3 most recent PHQ Screens 9/23/2021   PHQ Not Done -   Little interest or pleasure in doing things Not at all   Feeling down, depressed, irritable, or hopeless Not at all   Total Score PHQ 2 0       Learning Assessment:  Learning Assessment 10/7/2015   PRIMARY LEARNER Patient   PRIMARY LANGUAGE ENGLISH   LEARNER PREFERENCE PRIMARY LISTENING   ANSWERED BY patient   RELATIONSHIP SELF       Abuse Screening:  Abuse Screening Questionnaire 9/23/2021   Do you ever feel afraid of your partner? N   Are you in a relationship with someone who physically or mentally threatens you? N   Is it safe for you to go home? Y       Fall Risk  Fall Risk Assessment, last 12 mths 9/23/2021   Able to walk? Yes   Fall in past 12 months? 0   Do you feel unsteady? 0   Are you worried about falling 0   Number of falls in past 12 months -   Fall with injury? -         Coordination of Care:  1. Have you been to the ER, urgent care clinic since your last visit? Hospitalized since your last visit? No    2. Have you seen or consulted any other health care providers outside of the [address] [place] [address] since your last visit? Include any pap smears or colon screening.  No

## 2021-12-08 NOTE — PROGRESS NOTES
Praveen Cheng is a 67 y.o. female who presents for routine immunizations. She denies any symptoms , reactions or allergies that would exclude them from being immunized today. Risks and adverse reactions were discussed and the VIS was given to them. All questions were addressed. She was observed for 15 min post injection. There were no reactions observed.     Lidya Caldwell LPN

## 2021-12-08 NOTE — PROGRESS NOTES
Progress Note:Follow up Visit    12/08/21     Patient complains of cough productive of occasional yellow-green mucus with some brown discoloration in the mornings. After she coughs clear she does not have any problems for the rest of the day. She has not had any increase in shortness of breath wheezing, no fever chills or night sweats  Denies weight loss  She has not used any inhalers.-Has ProAir on hand  Has not had any ER visits. Has been following up with cardiology and is now on All J[place] and continues to remain on Eliquis  Last imaging studies were in July 2018  Last PFT in April 2017  No interim PFTs  She has received both doses of Covid vaccine.      HPI:   Ms. Thomas Izquierdo who carries history of COPD, recurrent bronchitis requiring visit to Urgent care, Diagnosis of PE on NOAC , Cardiomyopathy S/P pacemaker and defibrillator, and history of left breast cancer is evaluated for COPD/Chronic bronchitis work up. Last CT performed in MAY 2016 demonstrated PE, nonspecific mediastinal adenopathy, minimal atelectasis/small effusion, and hyperinflation. We have obtained follow up CT chest performed on 09/12/16 did not reveal any finding concerning PE or Mediastinal adenopathy. . During this interval time patient has not noticed any symptoms concerning COPD exacerbation. Her  RAMOS remains stable. 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 Subsequent follow ups- never has had TB. Allergies   Allergen Reactions    Lisinopril Rash     Hypertensive crisis    Tetracycline Anaphylaxis, Rash and Swelling    Hibiclens [Chlorhexidine Gluconate] Other (comments)     Turned red and BP drop low.  Other Medication Rash and Swelling     -Myacins     Current Outpatient Medications   Medication Sig Dispense Refill    Eliquis 5 mg tablet TAKE 1 TABLET BY MOUTH TWO (2) TIMES A DAY. 120 Tablet 3    metoprolol tartrate (LOPRESSOR) 100 mg IR tablet Take 100 mg by mouth two (2) times a day.       cholecalciferol (VITAMIN D3) (1000 Units /25 mcg) tablet Take 1,000 Units by mouth.  dilTIAZem ER (Cartia XT) 240 mg capsule Take 1 capsule by mouth once daily 90 Cap 3    pravastatin (PRAVACHOL) 80 mg tablet Take 1 Tab by mouth nightly. 30 Tab 6    albuterol sulfate (ProAir RespiClick) 90 mcg/actuation breath activated inhaler Take 2 Puffs by inhalation every four (4) hours as needed for Cough. 1 Inhaler 3    metFORMIN (GLUMETZA ER) 500 mg TG24 24 hour tablet Take 500 mg by mouth daily.  coenzyme q10 (CO Q-10) 10 mg cap Take  by mouth.  MULTIVIT-MIN/IRON/FOLIC/LUTEIN (CENTRUM SILVER WOMEN PO) Take  by mouth.  escitalopram oxalate (LEXAPRO) 20 mg tablet Take 20 mg by mouth daily.  exemestane (AROMASIN) 25 mg tablet Take 25 mg by mouth daily. Review of Systems   Constitutional: Negative. HENT: Negative. Eyes: Negative. Respiratory: cough, SOB  Cardiovascular: Negative. Gastrointestinal: Negative. Genitourinary: Negative. Musculoskeletal: Negative. Skin: Negative. Neurological: Negative. Endo/Heme/Allergies: Negative. Psychiatric/Behavioral: depression      Blood pressure 125/62, pulse 60, temperature 97.1 °F (36.2 °C), temperature source Oral, resp. rate 18, height 5' 5\" (1.651 m), weight 90.3 kg (199 lb), SpO2 96 %. Physical Exam   Constitutional: She is oriented to person, place, and time and well-developed, well-nourished, and in no distress. HENT:   Head: Normocephalic and atraumatic. Eyes: EOM are normal. Pupils are equal, round, and reactive to light. Neck: Normal range of motion. Neck supple. No thyromegaly present. Chest-kyphotic curvature  Cardiovascular: Normal rate and regular rhythm. Pulmonary/Chest: Effort normal. She has no wheezes. She has no rales.        Results from [place] encounter on 10/01/20    CT CHEST WO CONT    Narrative  EXAM:  CT Chest without Contrast.    CLINICAL INDICATION:    - J47.0 (ICD-10-CM) - Bronchiectasis with acute lower respiratory infection  - Lung nodule. COMPARISON:  No prior CTs available. Most recent available chest x-ray dated  3/28/11. TECHNIQUE:    Helically scanned volumetric axial sections of the chest are obtained without IV  contrast administration using current institutional modified standard HRCT  protocol, i.e. supine end-inspiratory phase, supine end-expiratory phase and  prone end-inspiratory phase scans with axial 1.5 mm reformats in 10 mm  increments. Coronal and sagittal multiplanar reconstruction images are  generated for improved anatomic delineation. Radiation dose optimization techniques are utilized as appropriate to the exam,  with combination of automated exposure control, adjustment of the mA and/or kV  according to patient's size (Including appropriate matching for site-specific  examinations), or use of iterative reconstruction technique. FINDINGS:    Lungs:    - There is subtle peripheral intralobular septal thickening/ reticulation  bilaterally at the lung bases without significant interval changes compared to  prior studies. No honeycombing. No definite bronchiectatic changes are  observed. - Focal area of scarring in the lingular region seen as a focus of wedge shaped  flat density.  - 0.2 cm nodule in the right lower lobe (axial #24). Although not mentioned  specifically on the prior studies, this density was present on the prior studies  as far back as 02/16/16.  - No infiltrate or consolidation is identified.  - No dominant lung mass. Pleura:  No significant pleural effusion is detected bilaterally. Mediastinum:  No adenopathy. Base of Neck, Chest Wall:  No adenopathy. ICD hub in the left upper torso. Chronic left breast medial aspect relatively thick-walled fluid collection  measuring about 3.8 x 2.8 cm. Probably related to chronic seroma related to  prior left breast surgery. Esophagus:  Mild hiatal hernia.     Upper Abdomen:  No acute abnormalities. Bones:  Developmental variant anatomy in the lower thoracic region with 2 tandem  foci of partial fusion or incomplete segmentation of the vertebrae, i.e. T10-11  and T12-L1. Impression  IMPRESSION:    1. Slight bilateral lung base subtle peripheral intralobular septal thickening/  reticulation, stable compared to prior studies, nonspecific. 2.  Stable 0.2 cm nodule in the right lower lobe. 3.  Stable left breast chronic seroma. 4.  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 CT chest (05/10/16): There are filling defects in the upper, middle, and lower lobe branches of the right pulmonary artery. No evidence of embolus in the left pulmonary artery branches or in the central trunks. The main pulmonary artery measures 3.3 cm in diameter. The thoracic aorta is not aneurysmal.  No evidence for dissection.      Lymph nodes: Mediastinal lymph nodes are mildly prominent. A right paratracheal lymph node measures 1.2 x 0.9 cm. Previously, this lymph node measured 1.8 x 0.4 cm. There is no evidence of hilar lymphadenopathy.      Thyroid: Normal in size without mass in the visualized parenchyma. .      Mediastinum: There is a pacemaker in the left chest wall. A fluid collection in the inferior left breast measures about 3.3 x 4.8 cm (previously, 2.8 x 4.4 cm). There is no evidence of peripheral enhancement. 2 adjacent biopsy clips are redemonstrated. There is a small hiatal hernia.      Lungs:    The lungs are diffusely hyperinflated with mild tracheobronchomalacia. Right Lung: There is a small posterior effusion with overlying atelectasis. No confluent infiltrate to suggest infarct.      Left Lung:  No evidence of infiltrate. There is a small focus of atelectasis in the posterior lingula. This is similar to previous exam. No pleural effusion.      Abdomen: There is diffuse fatty atrophy of the pancreas.  Visualized portions of the liver, spleen, pancreas, adrenal glands, and kidneys are unremarkable. The gallbladder is normal.      Bones: A fracture deformity in the left anterior fifth rib is unchanged. Degenerative changes of the spine are stable. 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 There are no destructive lesions.      IMPRESSION:      1.  Multiple right pulmonary emboli. No evidence of left pulmonary emboli or pulmonary infarcts. .   2.  Small right pleural effusion. 3. Prominence of the main portal artery is suggestive of pulmonary artery hypertension. 4. COPD and mild tracheobronchomalacia. 5. Similar size of seroma in the left chest wall.    .          PFT(2008): mild obstructive changes   DATE 4/19/2017  Pre bronchodilator ( 2008) Post Bronchodilator (2008)   FVC 2. 11( 69)  2.12(67) 2.13(67)   FEV1 1.60( 69)  1.69(73) 1.64(71)   FEV1/FVC 76  79(73) 76   TLC 4.04(78)  4.32(84)     RV 2.14(79)  2.12(107)     RV/TLC   49(38)     DLCO 13.76( 61)  12.47(66)        PFT 4/19/17:  Mild to moderate restrictive impairment with reduced DLCO. Assessment:    Bronchiectasis: Recurrent bronchitis/COPD exacerbation with Mild-Moderate Restrictive impairment ? sequelae of prior inflammatory process. Recurrant bronchitis- lower respiratory tract infections treated with antibiotics. Elevated IgE raises ? Allergic triggers and possible role for additional therapy. Symptoms are more consistent with chronic persistent bronchitis with cultures growing heavy normal garret. HRCT with nonspecific findings  History of COPD - exertional hypoxemia in past has now improved. PFTs with predominant restrictive ventilatory impairment likely from combination of bronchiectasis and kyphosis  Nonspecific Mediastinal adenopathy- S/P follow up CT chest demonstrated interval resolution  9/2016 CT. most recent CT scan from 10/1/2020 with nonspecific interstitial septal thickening and a 2 mm right lower lobe nodule. PE on NOAC ,5/206.  Resolved on follow up CT in 9/2016. Atrial fibrillation- on anticoagulation, cardizem for rate control  H/o breast biopsy- 2010. H/o radiation           Plan:  Continue with current treatment  Action plan for acute exacerbations discussed  If continues to remain symptomatic and fails to improve will obtain pulmonary function test and consider adjusting bronchodilator therapy  Continue Albuterol two puffs, every six hours as needed -predominantly for mucociliary clearance-refills ordered. Continue ELIQUIS as prescribed . Cardiology following for A. Fib, AICD  Encouraged active lifestyle and endurance activities. Preventive vaccinations  RTC in 6 months time and sooner if needed.   Discussed at length current condition, pathophysiology, care plan including testing and treatment options with patient        Kristopher Macias  Pulmonary and Critical Care Medicine

## 2022-01-04 NOTE — PROGRESS NOTES
Please see scanned document Post Acute Skilled Nursing Home f/u Visit Note     Date of Service: 1/4/2022  Location seen at: [facility]  Subacute / Skilled Need: Rehabilitation    PCP: Leilani Dozier MD   Patient Care Team:  Leilani Dozier MD as PCP - General (Internal Medicine)  Mc Rodriguez MD as Orthopedic Surgeon (Orthopedic Surgery)  Francis Mcelroy MD as Urologist (Urology)  Kenia Ferreira, RN as Ambulatory Care Manager  (Registered Nurse)  Haider Lowe MD as Cardiologist (Cardiovascular Disease)  Leilani Dozier MD as Post Acute Facility Provider: Physician (Internal Medicine)  Claudia Fernando CNP as Post Acute Facility Provider: APC (Nurse Practitioner - Adult Health)  Attending SNF MD: kit  Seen by Leilani Dozier MD today    Rashaad Ponce is a 76 year old male presenting to Post Acute Skilled Nursing for: Admission visit diagnosis rehab status post hospitalization for lower extremity cellulitis  History of Present Illness: 76-year-old male with past medical history of sick sinus syndrome with atrial fibrillation and bradycardia  On Eliquis, insulin-dependent type 2 diabetes, hypertension presented from the [facility] clinic with persistent elevated blood pressure and significant bradycardia. Patient recently had changes made to his medication but continued to have bradycardia and elevated blood pressure. Patient was seen by his cardiologist Dr. Lowe about 2 weeks ago was noted to have hypotension and bradycardia during that visit his metoprolol was discontinued due to bradycardia and patient was started on carvedilol 6.25 twice a day. Unclear of the date patient was taking medication correctly as per hospital records and as per patient he manages his own medications but does appear to be confused about the pills at times. Prior to this patient lives at his home with his wife who has been taking care of him recently after his hip replacement surgery last spring. Patient was offered home health and  rehab placement but wife and patient refused both. As per hospital record patient was also complaining of fatigue low energy shortness of breath patient was also found to have cellulitis in his left lower extremity and had been prescribed outpatient antibiotic therapy with Keflex and patient had endorsed feeling chills at times but denies any fever chest pain palpitation nausea vomiting abdominal pain diarrhea or constipation. In the ER patient's vital vitals were heart rate in the upper 30s blood pressure 185/61 respiration 18, afebrile labs showed sodium 139 potassium 3.4 creatinine 0.84 WBC 9.5 hemoglobin of 12.9 previously was 14.6, mildly elevated troponins at 100 EKG showed atrial fibrillation with slow ventricular response and intraventricular conduction delay. Patient was given 1 dose of vancomycin in the ER. Patient was later started on Ancef for his cellulitis ID was consulted and recommended that he switch to Keflex at discharge and finish a 7-day course through 12/13. Wound care was consulted and recommended patient would continue requiring wound care upon discharge. Patient was seen by EP in the hospital recommended pacemaker placement due to patient's history of sick sinus syndrome with episodes of high degree heart block. Patient's pacemaker was placed on 12/10. Patient tolerated the procedure well and was eventually cleared by EP for discharge. Patient's labs also showed a low iron and vitamin B12 deficiency. Patient was given vitamin B12 through the admission as per patient he was prescribed iron tablets in the past which he did not take. Hospital recommended outpatient work-up with colonoscopy which had never been done. Once stabilized patient was transferred to [facility] to continue rehab. Patient did have a chest x-ray on admission which showed a small left pleural effusion with no pneumothorax.  Patient seen and examined today sitting comfortably in wheelchair no distress denies any  nausea vomiting diarrhea constipation denies any fever or chills. As per patient he had gone for his outpatient appointment with therapy to [facility] where his blood pressure was found to be very high so he was sent to the ER for further management. Prior to this he lives at home with his wife in an apartment with no stairs normally walks with a walker is independent with all his ADLs does have a shower bench where he is able to go into the shower. His goal is to rehab and return home. His POA is his wife Gretel [phone] CODE STATUS discussed wishes to remain full code okay with return to the hospital or ICU if needed. Says that he still smokes 1 cigar every day was an ex-smoker quit 50 years ago. All review of systems currently negative patient noted to have some cellulitis but greatly improved from previous marking of the cellulitis. Patient does have a small scab on the shin which is dry and crusted we will continue to monitor. Left chest wall with permanent pacemaker in place covered with dressing small amount of blood soaked on the dressing but otherwise remained stable. Will continue PT OT and fall precaution. Patient to discharge home once ready. Patient blood pressure today much better controlled 120/60 with heart rate of 60. We will continue current medications adjust if needed. Discussed with nursing no other concerns.    12/28  Patient seen and examined today for follow-up visit seen sitting comfortably in wheelchair in no distress.  Patient was sent to the ED on 12/27 and return back to [facility] 3 hours later for hematuria.  According to the record patient developed hematuria with blood clots in the afternoon was irrigated with saline with no improvement.  Will need continued to have blood clots.  In the ER patient vitals were stable hemoglobin was 10.6 UA was done which came back positive patient was started on CBI and the urine cleared up.  Patient also received ceftriaxone in the  hospital and was discharged back to [facility] with 7 more days of antibiotics for UTI.  Patient seen and examined today says of note patient did have a recent hospitalization at [facility] from 12/17-12/26 seen sitting comfortably in wheelchair no distress.  Patient's Abraham draining clear urine with no hematuria present.  Denies any pain or discomfort denies any nausea vomiting diarrhea constipation.  Patient was sent to [facility] on 12/17 due to shortness of breath and chest pain.  And during that admission patient did have a Abraham placed due to urinary retention.  Patient was also found to be fluid overloaded and was diuresed during that time.  Patient's metoprolol dose was increased due to PVCs noted on telemetry monitoring.  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  Once stabilized patient was transferred back to [facility] on the 26th.  Patient's pacemaker site is examined today covered with Aquacel dressing surrounding skin clean with no of bleeding or infection noted.  Patient denies any pain or discomfort denies any nausea vomiting diarrhea constipation.  We will continue PT OT and fall precautions.  Discussed with nursing no other concerns continue Abraham care.    1/4  Patient seen and examined today for follow-up visit seen sitting comfortably in wheelchair no distress.  Patient did have a voiding trial done which he failed now with Abraham in place.  Will need to follow-up with urology as outpatient.  Patient also had permanent pacemaker checked.  Functioning well.  Aquacel removed now covered with Steri-Strips.  We will continue PT OT and fall precautions.  As per therapy update patient bed mobility contact-guard transfers contact-guard ambulating 100 feet standby contact-guard rolling walker going up 5 steps contact-guard upper body dressing set up lower body min assist toilet hygiene min assist toilet transfers contact-guard upper body bathing set up lower  body min.  Discussed with nursing no other concerns.  All other review of systems negative  HISTORY  Past Medical History:   Diagnosis Date   • Allergic rhinitis 3/12/2018   • Arthritis    • Atrial flutter (CMS/HCC)    • Blood clot associated with vein wall inflammation 1994    Left ankle   • Diabetes mellitus (CMS/HCC)     IDDM   • DVT (deep venous thrombosis) (CMS/[facility]) 3/27/2020    Josué-JOVANNI, Sowmya VALVERDE:of leg, 1986   • Essential (primary) hypertension    • Gout    • High cholesterol    • Kidney stones       reports that he has quit smoking. His smoking use included cigars. He smoked 0.00 packs per day. He quit smokeless tobacco use about 50 years ago. He reports previous alcohol use. He reports previous drug use. Drug: Marijuana.  Past Surgical History:   Procedure Laterality Date   • Cataract extraction, bilateral  2016    [facility]   • Cystoscopy  2011   • Joint replacement Left    • Total hip replacement Right 2005    Dr Harvey Nino at [facility]   • Total hip replacement Left 05/10/2021    Dr. Matamoros     Family History   Problem Relation Age of Onset   • Patient is unaware of any medical problems Mother      History     Not marked as reviewed during this visit.          PROBLEM LIST:  Specialty Problems     None           ADVANCE DIRECTIVES:  Rashaad Ponce     Decision Maker ([facility]#[id number])     Value   User Date/Time Recorded    Self  Mi Strong RN 5/7/2021 15:48:20          Do you have an AD? ([facility]#[id number])     Value   User Date/Time Recorded    No  Mi Strong RN 5/7/2021 15:48:20          Patient's desire to create an advance directive ([facility]#[id number])     Value   User Date/Time Recorded    Patient does not wish to complete at this time  Mi Strong RN 5/7/2021 15:48:20              Power of  Status:  Not Activated but says his wife would be the next of kin Gretel [phone]  Code Status:  FULL CODE  Goals of Care: Rehab and return home  Advanced Directive Forms: discussed       DEPRESSION SCREENING:  PHQ  2/9 Test Results  0: Not at all  1: Several days  2: More than half the days  3: Nearly every day  Recent Review Flowsheet Data     Date 12/22/2021    Adult PHQ 2 Score 0    Adult PHQ 2 Interpretation No further screening needed    Little interest or pleasure in activity? 0    Feeling down, depressed or hopeless? 0          DEPRESSION ASSESSMENT/PLAN:  Depression screening is negative no further plan needed.    ALLERGIES:  Allergies as of 01/04/2022   • (No Known Allergies)       CURRENT Home  MEDICATIONS:   Current Outpatient Medications   Medication Sig Dispense Refill   • furosemide (LASIX) 20 MG tablet Take 1 tablet by mouth daily. Do not start before December 24, 2021. 30 tablet 0   • lisinopril (ZESTRIL) 20 MG tablet Take 1 tablet by mouth daily. Do not start before December 24, 2021. 30 tablet 0   • metoPROLOL succinate (TOPROL-XL) 100 MG 24 hr tablet Take 1 tablet by mouth 2 times daily. 60 tablet 0   • tamsulosin (Flomax) 0.4 MG Cap Take 1 capsule by mouth daily after a meal. Do not start before December 24, 2021. 30 capsule 0   • acetaminophen (TYLENOL) 325 MG tablet Take 650 mg by mouth every 6 hours as needed for Pain.     • cyanocobalamin 1000 MCG tablet Take 1 tablet by mouth daily. Do not start before December 14, 2021. 30 tablet 0   • Droplet Insulin Syringe 31G X 15/64\" 0.5 ML Misc INJECT 2 TIMES DAILY. (NEED MD APPOINTMENT) 200 each 1   • Eliquis 5 MG Tab TAKE 1 TABLET BY MOUTH EVERY 12 HOURS. (Patient taking differently: Take 5 mg by mouth 2 times daily. ) 180 tablet 0   • metFORMIN (GLUCOPHAGE) 850 MG tablet TAKE 1 TABLET THREE TIMES DAILY (Patient taking differently: Take 850 mg by mouth 3 times daily. ) 270 tablet 3   • insulin NPH (HumuLIN N, NovoLIN N) 100 UNIT/ML injectable suspension Inject 10 Units into the skin 2 times daily. 20 mL 0   • atorvastatin (LIPITOR) 20 MG tablet TAKE 1 TABLET AT BEDTIME (Patient taking differently: Take 20 mg by mouth at bedtime. ) 90 tablet 3   • blood glucose  (Accu-Chek Madelyn Plus) test strip 3 times daily. 300 each 3   • SOFTCLIX LANCETS Misc TEST THREE TIMES DAILY 300 each 3   • Blood Pressure Monitoring (BLOOD PRESSURE KIT) Device 1 Device 2 days a week. 1 Device 1   • finasteride (PROSCAR) 5 MG tablet Take 1 tablet by mouth daily. (Patient taking differently: Take 5 mg by mouth every evening. ) 90 tablet 3     No current facility-administered medications for this visit.       CURRENT SNF MEDICATION:  Finasteride 5 mg daily  Chlortrimazole betamethasone cream apply topically to affected area 2 times a day  Nystatin powder apply to affected area 2 times a day  Eliquis 5 mg every 12  Carvedilol 6.25 mg twice a day  Vitamin B12 1000 MCG daily  Insulin NPH 10 units subcu 2 times a day  Lisinopril/hydrochlorothiazide 20/25 1 tablet daily  Amlodipine 10mg daily  Metformin 850 mg 3 times a day  Atorvastatin 20mg at bedtime    BASELINE FUNCTIONAL STATUS:  Lives at home with his wife in a condo no stairs, normally walks with a walker, independent with his ADLs  Goals to rehab and return home    CURRENT FUNCTIONAL STATUS:  Weight bearing as tolerated (WBAT)    DIET:  Consistency: General   Type: DM diet, cardiac diet  Appetite: Normal    REVIEW OF SYSTEMS:  Review of Systems   Constitutional: Negative for activity change, appetite change, chills, fatigue, fever and unexpected weight change.   HENT: Negative for congestion, dental problem, drooling, ear discharge, ear pain, facial swelling, hearing loss, mouth sores, nosebleeds, postnasal drip, rhinorrhea, sinus pressure, sinus pain, sneezing, sore throat, tinnitus, trouble swallowing and voice change.    Eyes: Negative for photophobia, pain, discharge, redness, itching and visual disturbance.   Respiratory: Negative for apnea, cough, choking, chest tightness, shortness of breath, wheezing and stridor.    Cardiovascular: Negative for chest pain, palpitations and leg swelling.   Gastrointestinal: Negative for abdominal  distention, abdominal pain, anal bleeding, blood in stool, constipation, diarrhea, nausea, rectal pain and vomiting.   Endocrine: Negative for cold intolerance, heat intolerance, polydipsia, polyphagia and polyuria.   Genitourinary: Negative for decreased urine volume, difficulty urinating, dysuria, enuresis, flank pain, frequency, genital sores, hematuria, penile discharge, penile pain, penile swelling, scrotal swelling, testicular pain and urgency.   Musculoskeletal: Negative for arthralgias, back pain, gait problem, joint swelling, myalgias, neck pain and neck stiffness.   Skin: Negative for color change, pallor, rash and wound.   Allergic/Immunologic: Negative for environmental allergies and food allergies.   Neurological: Negative for dizziness, tremors, seizures, syncope, facial asymmetry, speech difficulty, weakness, light-headedness, numbness and headaches.   Hematological: Negative for adenopathy. Does not bruise/bleed easily.   Psychiatric/Behavioral: Negative for agitation, behavioral problems, confusion, decreased concentration, dysphoric mood, hallucinations, self-injury, sleep disturbance and suicidal ideas. The patient is not nervous/anxious and is not hyperactive.        PHYSICAL ASSESSMENT:  Physical Exam  Vitals and nursing note reviewed.   Constitutional:       General: He is not in acute distress.     Appearance: He is well-developed. He is not diaphoretic.      Comments: Elderly male sitting comfortably in wheelchair in no distress   HENT:      Head: Normocephalic and atraumatic.      Right Ear: External ear normal.      Left Ear: External ear normal.      Nose: Nose normal.      Mouth/Throat:      Mouth: Mucous membranes are moist.      Pharynx: No oropharyngeal exudate.   Eyes:      General: No scleral icterus.        Right eye: No discharge.         Left eye: No discharge.      Extraocular Movements: Extraocular movements intact.      Conjunctiva/sclera: Conjunctivae normal.      Pupils:  Pupils are equal, round, and reactive to light.      Comments: Ptosis of the left keeps it close as per patient chronic   Neck:      Thyroid: No thyromegaly.      Vascular: No JVD.   Cardiovascular:      Rate and Rhythm: Normal rate. Rhythm irregular.      Heart sounds: Normal heart sounds. No murmur heard.  No friction rub. No gallop.       Comments: Left chest wall with permanent pacemaker surgical incision covered with Steri-Strips no signs of bleeding or infection noted  Pulmonary:      Effort: Pulmonary effort is normal. No respiratory distress.      Breath sounds: Normal breath sounds. No stridor. No wheezing or rales.   Chest:      Chest wall: No tenderness.   Abdominal:      General: Bowel sounds are normal. There is no distension.      Palpations: Abdomen is soft. There is no mass.      Tenderness: There is no abdominal tenderness. There is no guarding or rebound.      Hernia: No hernia is present. There is no hernia in the left inguinal area.   Genitourinary:     Penis: Normal. No erythema, tenderness or discharge.       Testes: Normal.         Right: Mass, tenderness or swelling not present.         Left: Mass, tenderness or swelling not present.      Rectum: Normal.      Comments: Abraham in place draining clear antonieta-colored urine with no blood clots or hematuria noted  Musculoskeletal:         General: Normal range of motion.      Cervical back: Normal range of motion and neck supple.      Comments: Able to move bilateral upper and lower extremities without any issues   Lymphadenopathy:      Cervical: No cervical adenopathy.      Lower Body: No right inguinal adenopathy. No left inguinal adenopathy.   Skin:     General: Skin is warm and dry.      Coloration: Skin is not pale.      Findings: No erythema or rash.      Comments: Resolving cellulitis on the left lower extremity  Previous marking on the skin from cellulitis earlier appears to be greatly improved  Skin scab on the shin covered with foam  dressing healing dried up  Left chest wall with permanent pacemaker surgical incision site covered with dressing small amount of blood soaking the dressing   Neurological:      General: No focal deficit present.      Mental Status: He is alert and oriented to person, place, and time. Mental status is at baseline.      Cranial Nerves: No cranial nerve deficit.      Sensory: No sensory deficit.      Motor: No abnormal muscle tone.      Coordination: Coordination normal.      Deep Tendon Reflexes: Reflexes normal.   Psychiatric:         Speech: Speech normal.         Behavior: Behavior normal.         Thought Content: Thought content normal.         Judgment: Judgment normal.         VITALS:  123/65, blood glucose 139, 98%, 0-10, 71, 18, 97.9, weight 164   138/87, blood glucose 111, 96%, 0-10, 82, 18, 97.2, weight 158.4  120/60, 97.2, 80, weight 174, 18, blood glucose 280, 96%, 0-10 pain    LABS:  Magnesium 1.8 phosphorus 3.3 WBC 6.86 hemoglobin 9.6 hematocrit 31.1 platelets 267 sodium 143 potassium 3.6 chloride 110 bicarb 24 BUN 21 creatinine 1.22 glucose 61  Magnesium 2.2 phosphorus 3.5 WBC 7.58 hemoglobin 10.7 hematocrit 34.4 platelets 398 glucose 72 BUN 35 creatinine 0.99 sodium 141 potassium 4 chloride 105 bicarb 25  Phosphorus 3.2 magnesium 2.2 WBC 9.18 hemoglobin 9.6 hematocrit 30.6 platelets 207 creatinine 1.37 BUN 43 glucose 105 sodium 141 potassium 3.7 chloride 104 bicarb 26    ASSESSMENT AND PLAN  Assessment   No problem-specific Assessment & Plan notes found for this encounter.  Hematuria  Patient with the Abraham insertion during last readmission to the [facility]  Was sent out to the hospital on the 27th for hematuria and blood clots  Was irrigated and found to have a UTI  Patient now on antibiotics will continue  Abraham clear urine draining clear urine  Patient to continue Keflex 4 times a day for 7 more days  We will continue to monitor  1/4 patient completed antibiotics  Currently remained stable  No  symptoms    Urinary retention  Continue Abraham care  Continue on tamsulosin and finasteride  1/4 patient failed voiding trials  Continue Abraham care Abraham now reinserted  Follow continue tamsulosin finasteride  Follow-up with urology    Left lower extremity cellulitis  Appears to have greatly improved  Patient initially treated with Ancef in the hospital  Followed by ID  90 recommended continue Keflex until 12/13 patient completed antibiotics  We will continue to monitor appears to have greatly improved  Does have a scab on the shin which is dried up  12/28 resolved  Continue to monitor  Continue local wound care does have some redness and scabbing  1/4 resolved  Completed antibiotics    High degree AV block/sss  Status post permanent pacemaker placed on 12/10  Follow-up with EP  Currently remains asymptomatic   12/28 patient was sent to the hospital on 12/17 remained there until 12/26  Was noted to have PVCs metoprolol dose was increased  Patient's pacemaker was upgraded  Incision now covered with Aquacel dressing  No current symptoms  We will continue to monitor  1/4 status post pacemaker check  Everything working well  Aquasol dressing removed  Covered with Steri-Strips      Elevated blood pressure  Continue with carvedilol  Vitals every shift  Patient also started on lisinopril hydrochlorothiazide combination as well as amlodipine  We will continue and adjust medications as needed  Today blood pressure much better controlled  Continue to monitor  Follow-up with cardiology  12/28 continue on metoprolol and lisinopril  Blood pressure currently well controlled  We will continue to monitor  Titrate as needed  1/4 blood pressure well controlled continue current medications    Debility  PT OT and fall precautions  12/28 continue PT OT and fall precautons  1/4 see above for therapy updates patient has done well    Iron deficiency and vitamin B12 deficiency  Patient discharged on vitamin B12 replacement we will  continue  We will continue to monitor weekly labs   12/28 continue on replacements  1/4 continue replacement    Type 2 diabetes  Continue Metformin and insulin  Accu-Cheks twice daily  Continue ADA diet  Will adjust dose as needed  12/28 blood glucose well controlled we will continue on Metformin and insulin as needed  1/4 blood pressure well controlled continue current medications    History of atrial fibrillation  Continue carvedilol currently rate controlled  Continue on Eliquis for anticoagulation  12/28 remains rate controlled  Continue Eliquis metoprolol succinate 100 mg twice daily  1/4 remains rate controlled continue metoprolol and Eliquis  Advance Care Planning     Advance Care Planning Visit    Patient and/or decision maker consent to Advance Care Planning visit.    Inclusion criteria: Inclusion criteria not met, but goals of care discussion needed.    PCP:  Chikis Mahoney MD    Meeting Leader:  pt    Participants:  Pt,md    Location:  pts room     Purpose of the Meeting:  Advance Care Planning and Goals of Care discussion    Is the patient capable of making healthcare decisions: Yes, the patient is able to receive, process, and then give feedback to information regarding medical discussions.   Decision Maker: pt (if this is not the patient, provide here name and contact information)     Does the patient have an Advance Care Directive document in Epic? Yes, it is current and updated. It is the most recent version, signed and dated by the patient, and witnessed by two non-family/non-caregiver signatures.    After code status discussion, the patient has decided on: Full Resuscitation       Goals of Care:  Patient has one or more life-limiting conditions: No    A discussion of the patient's Goals of Care has been completed with patient regarding the following:  (1) Current Serious Conditions: high degree av block with ppm placement   (2) Prognosis: GOOD     Function: Decline in mobility  (3) Patient-Centered  Goals: Aggressive, usual medical care, Live as long as possible, Return home and Visit with family  (4) Plan for Future Deterioration: Would be ok with returning to hospital for care.  Would be ok with returning to ICU for care.    The patient verbalized understanding of the above discussion.    The following additional care is recommended: Routine Home Care    Total Time spent in conversation and coordination of care: 30           Did attempt to call the wife over the phone with no answer left message with voicemail awaiting callback    FOLLOW UP APPOINTMENTS:  1 [facility] on 2021; For post-hospitalization follow-up at 3:10 PM  2 Schedule an appointment with Rhett Zepeda MD (Electrophysiology) in 3 months (3/23/2022); For follow-up with Cardiac Electrophysiology  3 Go to [facility] on 2021; For cardiac device check at 11:30 am.  4 Schedule an appointment with June Man MD (Urology) in 1 week (2021); For voiding trial          DISCHARGE PLANNING: Return home with home health to continue therapy once ready    Discussed with: RN / Nursing, Patient, NP/PA and Reviewed old records    Prognosis: good    Total time spent is more than 60 minutes, with more than 50% of the time spent in coordination of care, counseling, review of records and discussion of plan of care with the patient /staff /family.    HOME HEALTH FACE TO FACE DOCUMENTATION AND CERTIFICATION  CMS Rules: Certifying Patients for the Medicare Home Health Benefit    Home Health Eligibility Summary    Name: Rashaad Ponce  : 1945  MRN: [id number]    Service to Home Care order content:  There are no questions and answers to display.       I certify this patient is under my care and  I, or a nurse practitioner, clinical nurse specialist or physician assistant working with me, had a face-to-face encounter with this patient on the date listed.    Medical  Condition Related to Home Health Services  The encounter with the patient was in whole, or in part, for the following medical condition(s), which are the reasons for home health care:   1. Debility    2. Urinary retention    3. Hypertensive heart failure (CMS/[facility])    4. Atrial fibrillation, unspecified type (CMS/[facility])    5. Elevated lipoprotein(a)    6. Essential (primary) hypertension    7. BPH with obstruction/lower urinary tract symptoms    8. Sick sinus syndrome (CMS/[facility])    9. Type 2 diabetes mellitus with microalbuminuria, with long-term current use of insulin (CMS/[facility])    10. Bradycardia           Certification of Medical Necessity(Certification is required for select Home Health beneficiaries only)  I certify based on my clinical findings, the services ordered in the referral are medically necessary home health services.    Homebound Status and Living Situation  I certify my clinical findings support this patient is homebound due to the homebound reason(s) listed in the Service to Home Care order content link above.  If reason for homebound status is not specified, this document does not certify the patient as homebound.    I have authorized these skilled home health services and the \"Physician to follow\" listed above will accept and assume care for this patient and sign the Home Health Plan of Care.    Date of completion of form: 2022    HOME HEALTH FACE TO FACE DOCUMENTATION AND CERTIFICATION  CMS Rules: Certifying Patients for the Medicare Home Health Benefit    Home Health Eligibility Summary    Name: Rashaad Ponce  : 1945  MRN: [id number]    Service to Home Care order content:  There are no questions and answers to display.       I certify this patient is under my care and  I, or a nurse practitioner, clinical nurse specialist or physician assistant working with me, had a face-to-face encounter with this patient on the date listed.    Medical Condition Related to Home Health Services  The  encounter with the patient was in whole, or in part, for the following medical condition(s), which are the reasons for home health care:   1. Debility    2. Urinary retention    3. Hypertensive heart failure (CMS/[facility])    4. Atrial fibrillation, unspecified type (CMS/[facility])    5. Elevated lipoprotein(a)    6. Essential (primary) hypertension    7. BPH with obstruction/lower urinary tract symptoms    8. Sick sinus syndrome (CMS/[facility])    9. Type 2 diabetes mellitus with microalbuminuria, with long-term current use of insulin (CMS/[facility])    10. Bradycardia           Certification of Medical Necessity(Certification is required for select Home Health beneficiaries only)  I certify based on my clinical findings, the services ordered in the referral are medically necessary home health services.    Homebound Status and Living Situation  I certify my clinical findings support this patient is homebound due to the homebound reason(s) listed in the Service to Home Care order content link above.  If reason for homebound status is not specified, this document does not certify the patient as homebound.    I have authorized these skilled home health services and the \"Physician to follow\" listed above will accept and assume care for this patient and sign the Home Health Plan of Care.    Date of completion of form: 2022    HOME HEALTH FACE TO FACE DOCUMENTATION AND CERTIFICATION  CMS Rules: Certifying Patients for the Medicare Home Health Benefit    Home Health Eligibility Summary    Name: Rashaad Ponce  : 1945  MRN: [id number]    Service to Home Care order content:  There are no questions and answers to display.       I certify this patient is under my care and  I, or a nurse practitioner, clinical nurse specialist or physician assistant working with me, had a face-to-face encounter with this patient on the date listed.    Medical Condition Related to Home Health Services  The encounter with the patient was in whole, or in  part, for the following medical condition(s), which are the reasons for home health care:   1. Debility    2. Urinary retention    3. Hypertensive heart failure (CMS/[facility])    4. Atrial fibrillation, unspecified type (CMS/[facility])    5. Elevated lipoprotein(a)    6. Essential (primary) hypertension    7. BPH with obstruction/lower urinary tract symptoms    8. Sick sinus syndrome (CMS/[facility])    9. Type 2 diabetes mellitus with microalbuminuria, with long-term current use of insulin (CMS/[facility])    10. Bradycardia           Certification of Medical Necessity(Certification is required for select Home Health beneficiaries only)  I certify based on my clinical findings, the services ordered in the referral are medically necessary home health services.    Homebound Status and Living Situation  I certify my clinical findings support this patient is homebound due to the homebound reason(s) listed in the Service to Home Care order content link above.  If reason for homebound status is not specified, this document does not certify the patient as homebound.    I have authorized these skilled home health services and the \"Physician to follow\" listed above will accept and assume care for this patient and sign the Home Health Plan of Care.    Date of completion of form: 1/4/2022

## 2022-01-19 RX ORDER — PRAVASTATIN SODIUM 80 MG/1
TABLET ORAL
Qty: 30 TABLET | Refills: 6 | Status: SHIPPED | OUTPATIENT
Start: 2022-01-19 | End: 2022-08-31

## 2022-02-03 RX ORDER — DILTIAZEM HYDROCHLORIDE 240 MG/1
CAPSULE, COATED, EXTENDED RELEASE ORAL
Qty: 90 CAPSULE | Refills: 3 | Status: SHIPPED | OUTPATIENT
Start: 2022-02-03 | End: 2022-10-19 | Stop reason: SDUPTHER

## 2022-02-23 RX ORDER — METOPROLOL TARTRATE 100 MG/1
TABLET ORAL
Qty: 180 TABLET | Refills: 3 | Status: SHIPPED | OUTPATIENT
Start: 2022-02-23 | End: 2022-10-19 | Stop reason: SDUPTHER

## 2022-03-10 NOTE — PROGRESS NOTES
Mariaelena Jiménez presents today for a 6 month check-up. She was last seen by Dr. Paulino Varela on 9/23/21. Since that visit, she states that she has been feeling [place]. \"  She has not had any cardiac issues arise. She monitors her blood pressures at home and states that her blood pressure is normally well controlled. She is under increased stress as her  has dementia and she is his primary caregiver. 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 She also has a history of cardiomyopathy which has improved from an EF of 25-30% in February 2007 to now 60-65% in April 2012. Her original device was implanted in May 2008. She underwent a generator change on 10/10/12. She developed rapid atrial fibrillation associated with pneuminia and subsequently shocked by her ICD because of the fast heart rate in July 2018.  In the hospital, there was no evidence of decompensated congestive heart failure. She had an echocardiogram on 7/30/18 which demonstrated normal left ventricular systolic function with EF in the 56-60% range. PA pressure was estimated at 40 mmHg. There was no significant valvular pathology. The left atrial dimension was normal with volume index of 31 ml/m sq. The interrogation of the ICD demonstrated once again the episode of left atrial fibrillation and ICD shock on 7/29/18. Her OptiVol was normal indicating normal volume status at that time. She denies chest pain, tightness, heaviness, and palpitations. She denies shortness of breath at rest, admits to mild, occasional dyspnea on exertion, denies orthopnea and PND. She denies abdominal bloating. She denies lightheadedness, dizziness, and syncope. She denies lower extremity edema and claudication. Denies nausea, vomiting, diarrhea, fever, chills.   She remains physically active and is able to perform activities of daily living without difficulty. Past Medical History:   Diagnosis Date    CAD (coronary artery disease)     cardiomyopathy,CHF,,Cardiac Cath, pacemaker/defib.  Cancer [facility]) 2013    breast    Cardiac catheterization 02/27/2008    Patent coronary arteries. LVEDP 13 mmHg. EF 25-30%. Global hypk.  Cardiac echocardiogram 07/07/2016    EF 50% (prev 60-65% on study of 4/16/12). No WMA. Gr 1 DDfx. Normal RVSP.  Cardiac nuclear imaging test 05/31/2013    No evidence of ongoing ischemia or prior infarction. EF 60%. No RWMA. Mild dyssynchrony of inferior base & mid/distal septum likely c/w pacemaker. Nondiagnostic EKG on pharm stress test due to V-pacing.  Cardiovascular abdominal aorta duplex 09/23/2015    Fusiform AAA in prox & mid portions of abdom Ao.  Chronic lung disease     COPD (chronic obstructive pulmonary disease) (HCC) chronic bronchitis    Coronary artery disease Feb. 2008    Possible CAD with nonischemic dilated cardiomyopathy/EF 25%/ improved EF up to 56% by echocardiogram.    Coronary artery disease     Diabetes mellitus ([address]     GERD (gastroesophageal reflux disease)     Heart failure (HCC)     Heart murmur     Hemangioma of liver     Hx of cardiomyopathy ([address]     Hypercholesterolemia     Hypertension     Left bundle branch block     Phlebitis     PVD (peripheral vascular disease) ([address]     Renal calculi 1999    Thromboembolus ([address]     below knee in R leg     Past Surgical History:   Procedure Laterality Date    COLONOSCOPY N/A 3/7/2017    COLONOSCOPY, SURVEILLANCE with polypectomy performed by Kain Murphy MD at [address] [place] [address] HX BREAST LUMPECTOMY Left 8/2013    cancerous lesion    HX CATARACT REMOVAL Bilateral 1980s    w/ lens implants    HX CATARACT REMOVAL Left     re vised due to implant repositioning    HX HEART CATHETERIZATION  02/27/08    Patent coronary arteries, but severe LV dysfunction w/ EF in the 25-30% range.     HX KNEE ARTHROSCOPY Right     HX MASTECTOMY Left 2014    LEFT PARTIAL MASTECTOMY WITH NEEDLE LOCALIZATION MAMMOGRAM TIMES TWO performed by Marisa Silver MD at [facility] MAIN OR    HX PACEMAKER  May 2008    Biventricular ICD implantation     HX PACEMAKER      replacement - Medtronic    HX PACEMAKER PLACEMENT      HX PELVIC LAPAROSCOPY      diagnostic - varicosities     Family History   Problem Relation Age of Onset    Cancer Mother         Cancer of the breast    Stroke Mother     Cancer Father     Heart Disease Father         CHF    COPD Brother     Heart Attack Brother         Multiple    Heart Disease Brother         CHF     Social History     Tobacco Use    Smoking status: Former Smoker     Packs/day: 1.50     Years: 20.00     Pack years: 30.00     Types: Cigarettes     Start date: 4/15/1963     Quit date: 1988     Years since quittin.0    Smokeless tobacco: Never Used   Substance Use Topics    Alcohol use: No    Drug use: No       Current Outpatient Medications     Current Outpatient Medications   Medication Sig    metoprolol tartrate (LOPRESSOR) 100 mg IR tablet TAKE 1 TABLET BY MOUTH TWICE DAILY    dilTIAZem ER (CARDIZEM CD) 240 mg capsule TAKE 1 CAPSULE BY MOUTH ONCE DAILY    pravastatin (PRAVACHOL) 80 mg tablet TAKE 1 TABLET BY MOUTH NIGHTLY.  Eliquis 5 mg tablet TAKE 1 TABLET BY MOUTH TWO (2) TIMES A DAY.  albuterol sulfate (ProAir RespiClick) 90 mcg/actuation breath activated inhaler Take 2 Puffs by inhalation every four (4) hours as needed for Cough.  escitalopram oxalate (LEXAPRO) 20 mg tablet Take 20 mg by mouth daily. No current facility-administered medications for this visit. Allergies   Allergen Reactions    Lisinopril Rash     Hypertensive crisis    Tetracycline Anaphylaxis, Rash and Swelling    Hibiclens [Chlorhexidine Gluconate] Other (comments)     Turned red and BP drop low.     Other Medication Rash and Swelling     -Myacins       Physical:  Visit Vitals  BP (!) 140/78   Pulse 60   Ht 5' 5\" (1.651 m)   Wt 89.3 kg (196 lb 12.8 oz)   SpO2 96%   BMI 32.75 kg/m²         Neck:  Supple, no JVD, no carotid bruits  CV:  Normal S1 and  S2, grade I/VI KWESI noted, rubs, or gallops noted  Lungs:  Clear to ausculation throughout, no wheezes or rales  Abd:  Soft, non-tender, non-distended with good bowel sounds. No hepatosplenomegaly  Extremities:  No edema      EKG:        LABS:  Lab Results   Component Value Date/Time    Sodium 139 05/07/2021 01:45 PM    Potassium 3.9 05/07/2021 01:45 PM    Chloride 107 05/07/2021 01:45 PM    CO2 27 05/07/2021 01:45 PM    Glucose 93 05/07/2021 01:45 PM    BUN 19 (H) 05/07/2021 01:45 PM    Creatinine 0.92 05/07/2021 01:45 PM     Lab Results   Component Value Date/Time    Cholesterol, total 222 (H) 07/15/2020 01:06 PM    HDL Cholesterol 44 07/15/2020 01:06 PM    LDL, calculated 150 (H) 07/15/2020 01:06 PM    Triglyceride 139 07/15/2020 01:06 PM    CHOL/HDL Ratio 5.0 (H) 07/15/2020 01:06 PM     No components found for: GPT     Impression / Plan:  1. S/p Bi-V/ICD for cardiomyopathy (with now improved EF), 56-60% by echo July 2018  2. Hypertension, blood pressure slightly elevated  3. Hypercholesterolemia, on pravastatin 80mg  4. Paroxysmal atrial fibrillation, anticoagulated with Eliquis    Mrs. Delores Cole was seen today for a 6 month check-up. Overall, she has been feeling fairly well but has been under increased stress as her  has dementia and she is his primary caregiver. Her blood pressure today is more elevated than in the past but she states that she became upset this morning prior to coming to the office. At home however, she states that her blood pressures are normally better controlled. She does not exhibit any signs of volume overload. Her medication list was reviewed and updated. Her device was interrogated today and it shows that she has 17 months of battery life left.   She had one episode of AT/AF on 2/19/22 that lasted for 1 minute and 13 seconds. She has been scheduled to return to the office in 3 months for another device check. She will follow-up with Dr. Raquel Jeffries as scheduled and PRN. Her device will also be checked during that appointment. Stacey HOLLAND, FNP-BC    Please note:  Portions of this chart were created with Dragon medical speech to text program.  Unrecognized errors may be present.

## 2022-03-17 ENCOUNTER — CLINICAL SUPPORT (OUTPATIENT)
Dept: CARDIOLOGY CLINIC | Age: 77
End: 2022-03-17
Payer: MEDICARE

## 2022-03-17 ENCOUNTER — OFFICE VISIT (OUTPATIENT)
Dept: CARDIOLOGY CLINIC | Age: 77
End: 2022-03-17

## 2022-03-17 VITALS
OXYGEN SATURATION: 96 % | HEIGHT: 65 IN | HEART RATE: 60 BPM | SYSTOLIC BLOOD PRESSURE: 140 MMHG | WEIGHT: 196.8 LBS | DIASTOLIC BLOOD PRESSURE: 78 MMHG | BODY MASS INDEX: 32.79 KG/M2

## 2022-03-17 DIAGNOSIS — I42.9 CARDIOMYOPATHY, IDIOPATHIC (HCC): Primary | ICD-10-CM

## 2022-03-17 DIAGNOSIS — J44.9 CHRONIC OBSTRUCTIVE PULMONARY DISEASE, UNSPECIFIED COPD TYPE (HCC): ICD-10-CM

## 2022-03-17 DIAGNOSIS — I42.9 CARDIOMYOPATHY, IDIOPATHIC (HCC): ICD-10-CM

## 2022-03-17 DIAGNOSIS — Z95.810 BIVENTRICULAR IMPLANTABLE CARDIOVERTER-DEFIBRILLATOR IN SITU: ICD-10-CM

## 2022-03-17 DIAGNOSIS — I25.10 ATHEROSCLEROSIS OF NATIVE CORONARY ARTERY OF NATIVE HEART WITHOUT ANGINA PECTORIS: ICD-10-CM

## 2022-03-17 DIAGNOSIS — I48.0 PAF (PAROXYSMAL ATRIAL FIBRILLATION) (HCC): ICD-10-CM

## 2022-03-17 DIAGNOSIS — E78.5 DYSLIPIDEMIA: ICD-10-CM

## 2022-03-17 DIAGNOSIS — I10 HYPERTENSION, UNSPECIFIED TYPE: Primary | ICD-10-CM

## 2022-03-17 PROCEDURE — G8432 DEP SCR NOT DOC, RNG: HCPCS | Performed by: NURSE PRACTITIONER

## 2022-03-17 PROCEDURE — G8536 NO DOC ELDER MAL SCRN: HCPCS | Performed by: NURSE PRACTITIONER

## 2022-03-17 PROCEDURE — 99214 OFFICE O/P EST MOD 30 MIN: CPT | Performed by: NURSE PRACTITIONER

## 2022-03-17 PROCEDURE — 93284 PRGRMG EVAL IMPLANTABLE DFB: CPT | Performed by: INTERNAL MEDICINE

## 2022-03-17 PROCEDURE — G8417 CALC BMI ABV UP PARAM F/U: HCPCS | Performed by: NURSE PRACTITIONER

## 2022-03-17 PROCEDURE — 1090F PRES/ABSN URINE INCON ASSESS: CPT | Performed by: NURSE PRACTITIONER

## 2022-03-17 PROCEDURE — 1101F PT FALLS ASSESS-DOCD LE1/YR: CPT | Performed by: NURSE PRACTITIONER

## 2022-03-17 PROCEDURE — G8753 SYS BP > OR = 140: HCPCS | Performed by: NURSE PRACTITIONER

## 2022-03-17 PROCEDURE — G8754 DIAS BP LESS 90: HCPCS | Performed by: NURSE PRACTITIONER

## 2022-03-17 PROCEDURE — G8427 DOCREV CUR MEDS BY ELIG CLIN: HCPCS | Performed by: NURSE PRACTITIONER

## 2022-03-17 NOTE — PATIENT INSTRUCTIONS
Continue present medication regimen  In office device check every 3 months as scheduled  Follow-up with Dr. Evette Dance as scheduled and as needed

## 2022-03-18 PROBLEM — M94.9 DISORDER OF BONE AND ARTICULAR CARTILAGE: Status: ACTIVE | Noted: 2021-04-06

## 2022-03-18 PROBLEM — N95.1 MENOPAUSAL SYMPTOM: Status: ACTIVE | Noted: 2021-04-06

## 2022-03-18 PROBLEM — M89.9 DISORDER OF BONE AND ARTICULAR CARTILAGE: Status: ACTIVE | Noted: 2021-04-06

## 2022-03-18 PROBLEM — M81.0 SENILE OSTEOPOROSIS: Status: ACTIVE | Noted: 2019-11-22

## 2022-03-18 PROBLEM — J47.0 BRONCHIECTASIS WITH ACUTE LOWER RESPIRATORY INFECTION (HCC): Status: ACTIVE | Noted: 2018-03-13

## 2022-03-19 PROBLEM — N17.9 AKI (ACUTE KIDNEY INJURY) (HCC): Status: ACTIVE | Noted: 2018-07-31

## 2022-03-19 PROBLEM — Z13.71 BRCA NEGATIVE: Status: ACTIVE | Noted: 2021-04-06

## 2022-03-19 PROBLEM — R91.1 INCIDENTAL LUNG NODULE: Status: ACTIVE | Noted: 2021-01-04

## 2022-03-19 PROBLEM — I47.20 VENTRICULAR TACHYARRHYTHMIA (HCC): Status: ACTIVE | Noted: 2018-07-29

## 2022-03-20 PROBLEM — M85.80 AGE-RELATED BONE LOSS: Status: ACTIVE | Noted: 2021-01-11

## 2022-03-20 PROBLEM — Z45.02 AICD AT END OF BATTERY LIFE: Status: ACTIVE | Noted: 2017-03-13

## 2022-03-20 PROBLEM — J40 BRONCHITIS: Status: ACTIVE | Noted: 2018-07-31

## 2022-03-20 PROBLEM — N64.89 SEROMA OF BREAST: Status: ACTIVE | Noted: 2021-04-06

## 2022-03-28 NOTE — PROGRESS NOTES
I have personally seen and evaluated the device findings. Interrogation reviewed and I agree with assessment.     Jack Weems

## 2022-04-11 ENCOUNTER — TELEPHONE (OUTPATIENT)
Dept: PULMONOLOGY | Age: 77
End: 2022-04-11

## 2022-04-11 NOTE — TELEPHONE ENCOUNTER
FYHAYDEN-Pt got booster shot through [facility]. They came to her home to do it. She stated that she had a reaction to it. Stated her arm swelled up and was red. Stated that 'it might hurt it I hit it'.

## 2022-05-04 RX ORDER — SODIUM CHLORIDE 9 MG/ML
10 INJECTION INTRAMUSCULAR; INTRAVENOUS; SUBCUTANEOUS AS NEEDED
Status: CANCELLED | OUTPATIENT
Start: 2022-05-11

## 2022-05-04 RX ORDER — HEPARIN 100 UNIT/ML
300-500 SYRINGE INTRAVENOUS AS NEEDED
Status: CANCELLED
Start: 2022-05-11

## 2022-05-04 RX ORDER — EPINEPHRINE 1 MG/ML
0.3 INJECTION, SOLUTION, CONCENTRATE INTRAVENOUS AS NEEDED
Status: CANCELLED | OUTPATIENT
Start: 2022-05-11

## 2022-05-04 RX ORDER — ALBUTEROL SULFATE 0.83 MG/ML
2.5 SOLUTION RESPIRATORY (INHALATION) AS NEEDED
Status: CANCELLED
Start: 2022-05-11

## 2022-05-04 RX ORDER — DIPHENHYDRAMINE HYDROCHLORIDE 50 MG/ML
50 INJECTION, SOLUTION INTRAMUSCULAR; INTRAVENOUS AS NEEDED
Status: CANCELLED
Start: 2022-05-11

## 2022-05-04 RX ORDER — HYDROCORTISONE SODIUM SUCCINATE 100 MG/2ML
100 INJECTION, POWDER, FOR SOLUTION INTRAMUSCULAR; INTRAVENOUS AS NEEDED
Status: CANCELLED | OUTPATIENT
Start: 2022-05-11

## 2022-05-04 RX ORDER — ONDANSETRON 2 MG/ML
8 INJECTION INTRAMUSCULAR; INTRAVENOUS AS NEEDED
Status: CANCELLED | OUTPATIENT
Start: 2022-05-11

## 2022-05-04 RX ORDER — ACETAMINOPHEN 325 MG/1
650 TABLET ORAL AS NEEDED
Status: CANCELLED
Start: 2022-05-11

## 2022-05-04 RX ORDER — DIPHENHYDRAMINE HYDROCHLORIDE 50 MG/ML
25 INJECTION, SOLUTION INTRAMUSCULAR; INTRAVENOUS AS NEEDED
Status: CANCELLED
Start: 2022-05-11

## 2022-05-09 ENCOUNTER — HOSPITAL ENCOUNTER (OUTPATIENT)
Dept: INFUSION THERAPY | Age: 77
Discharge: HOME | End: 2022-05-09
Payer: MEDICARE

## 2022-05-09 VITALS
TEMPERATURE: 98.7 F | RESPIRATION RATE: 16 BRPM | OXYGEN SATURATION: 96 % | HEART RATE: 60 BPM | DIASTOLIC BLOOD PRESSURE: 79 MMHG | SYSTOLIC BLOOD PRESSURE: 130 MMHG

## 2022-05-09 LAB
ALBUMIN SERPL-MCNC: 3.8 G/DL (ref 3.4–5)
ALBUMIN/GLOB SERPL: 1.4 {RATIO} (ref 0.8–1.7)
ALP SERPL-CCNC: 80 U/L (ref 45–117)
ALT SERPL-CCNC: 15 U/L (ref 13–56)
ANION GAP SERPL CALC-SCNC: 5 MMOL/L (ref 3–18)
AST SERPL-CCNC: 11 U/L (ref 10–38)
BILIRUB SERPL-MCNC: 0.6 MG/DL (ref 0.2–1)
BUN SERPL-MCNC: 10 MG/DL (ref 7–18)
BUN/CREAT SERPL: 12 (ref 12–20)
CALCIUM SERPL-MCNC: 8.9 MG/DL (ref 8.5–10.1)
CHLORIDE SERPL-SCNC: 110 MMOL/L (ref 100–111)
CO2 SERPL-SCNC: 30 MMOL/L (ref 21–32)
CREAT SERPL-MCNC: 0.84 MG/DL (ref 0.6–1.3)
GLOBULIN SER CALC-MCNC: 2.8 G/DL (ref 2–4)
GLUCOSE SERPL-MCNC: 84 MG/DL (ref 74–99)
POTASSIUM SERPL-SCNC: 3.9 MMOL/L (ref 3.5–5.5)
PROT SERPL-MCNC: 6.6 G/DL (ref 6.4–8.2)
SODIUM SERPL-SCNC: 145 MMOL/L (ref 136–145)

## 2022-05-09 PROCEDURE — 36415 COLL VENOUS BLD VENIPUNCTURE: CPT

## 2022-05-09 PROCEDURE — 80053 COMPREHEN METABOLIC PANEL: CPT

## 2022-05-09 NOTE — PROGRESS NOTES
[address] Lab Visit:    [facility]  1945  [id number]    3632 Pt arrived ambulatory w/o assist. Labs drawn per order via Left AC venipuncture x1 attempt. Pt tolerated well without complaints. Gauze/ coban to site. Pt departed [facility] ambulatory and in no distress at 1335.     Visit Vitals  /79 (BP 1 Location: Left upper arm, BP Patient Position: Sitting)   Pulse 60   Temp 98.7 °F (37.1 °C)   Resp 16   SpO2 96%

## 2022-05-11 ENCOUNTER — HOSPITAL ENCOUNTER (OUTPATIENT)
Dept: INFUSION THERAPY | Age: 77
Discharge: HOME | End: 2022-05-11
Payer: MEDICARE

## 2022-05-11 VITALS
WEIGHT: 196 LBS | RESPIRATION RATE: 20 BRPM | OXYGEN SATURATION: 96 % | BODY MASS INDEX: 30.76 KG/M2 | TEMPERATURE: 97.2 F | DIASTOLIC BLOOD PRESSURE: 60 MMHG | HEIGHT: 67 IN | SYSTOLIC BLOOD PRESSURE: 92 MMHG | HEART RATE: 60 BPM

## 2022-05-11 DIAGNOSIS — M81.0 SENILE OSTEOPOROSIS: Primary | ICD-10-CM

## 2022-05-11 PROCEDURE — 74011000250 HC RX REV CODE- 250: Performed by: INTERNAL MEDICINE

## 2022-05-11 PROCEDURE — 74011250637 HC RX REV CODE- 250/637: Performed by: INTERNAL MEDICINE

## 2022-05-11 PROCEDURE — 74011250636 HC RX REV CODE- 250/636: Performed by: INTERNAL MEDICINE

## 2022-05-11 PROCEDURE — 96365 THER/PROPH/DIAG IV INF INIT: CPT

## 2022-05-11 RX ORDER — ZOLEDRONIC ACID 5 MG/100ML
5 INJECTION, SOLUTION INTRAVENOUS ONCE
Status: COMPLETED | OUTPATIENT
Start: 2022-05-11 | End: 2022-05-11

## 2022-05-11 RX ORDER — EPINEPHRINE 1 MG/ML
0.3 INJECTION, SOLUTION, CONCENTRATE INTRAVENOUS AS NEEDED
OUTPATIENT
Start: 2023-05-10

## 2022-05-11 RX ORDER — ACETAMINOPHEN 325 MG/1
650 TABLET ORAL ONCE
Status: COMPLETED | OUTPATIENT
Start: 2022-05-11 | End: 2022-05-11

## 2022-05-11 RX ORDER — DIPHENHYDRAMINE HYDROCHLORIDE 50 MG/ML
50 INJECTION, SOLUTION INTRAMUSCULAR; INTRAVENOUS AS NEEDED
Start: 2023-05-10

## 2022-05-11 RX ORDER — HEPARIN 100 UNIT/ML
300-500 SYRINGE INTRAVENOUS AS NEEDED
Start: 2023-05-10

## 2022-05-11 RX ORDER — DIPHENHYDRAMINE HYDROCHLORIDE 50 MG/ML
25 INJECTION, SOLUTION INTRAMUSCULAR; INTRAVENOUS AS NEEDED
Start: 2023-05-10

## 2022-05-11 RX ORDER — ACETAMINOPHEN 325 MG/1
650 TABLET ORAL AS NEEDED
Start: 2023-05-10

## 2022-05-11 RX ORDER — HYDROCORTISONE SODIUM SUCCINATE 100 MG/2ML
100 INJECTION, POWDER, FOR SOLUTION INTRAMUSCULAR; INTRAVENOUS AS NEEDED
OUTPATIENT
Start: 2023-05-10

## 2022-05-11 RX ORDER — ZOLEDRONIC ACID 5 MG/100ML
5 INJECTION, SOLUTION INTRAVENOUS ONCE
OUTPATIENT
Start: 2023-05-10 | End: 2023-05-10

## 2022-05-11 RX ORDER — ONDANSETRON 2 MG/ML
8 INJECTION INTRAMUSCULAR; INTRAVENOUS AS NEEDED
OUTPATIENT
Start: 2023-05-10

## 2022-05-11 RX ORDER — SODIUM CHLORIDE 9 MG/ML
25 INJECTION, SOLUTION INTRAVENOUS CONTINUOUS
OUTPATIENT
Start: 2023-05-10

## 2022-05-11 RX ORDER — SODIUM CHLORIDE 9 MG/ML
25 INJECTION, SOLUTION INTRAVENOUS CONTINUOUS
Status: DISCONTINUED | OUTPATIENT
Start: 2022-05-11 | End: 2022-05-12 | Stop reason: HOSPADM

## 2022-05-11 RX ORDER — SODIUM CHLORIDE 0.9 % (FLUSH) 0.9 %
10 SYRINGE (ML) INJECTION AS NEEDED
OUTPATIENT
Start: 2023-05-10

## 2022-05-11 RX ORDER — ALBUTEROL SULFATE 0.83 MG/ML
2.5 SOLUTION RESPIRATORY (INHALATION) AS NEEDED
Start: 2023-05-10

## 2022-05-11 RX ORDER — SODIUM CHLORIDE 0.9 % (FLUSH) 0.9 %
10 SYRINGE (ML) INJECTION AS NEEDED
Status: DISCONTINUED | OUTPATIENT
Start: 2022-05-11 | End: 2022-05-12 | Stop reason: HOSPADM

## 2022-05-11 RX ORDER — SODIUM CHLORIDE 9 MG/ML
10 INJECTION INTRAMUSCULAR; INTRAVENOUS; SUBCUTANEOUS AS NEEDED
OUTPATIENT
Start: 2023-05-10

## 2022-05-11 RX ADMIN — ACETAMINOPHEN 650 MG: 325 TABLET ORAL at 14:40

## 2022-05-11 RX ADMIN — SODIUM CHLORIDE 25 ML/HR: 9 INJECTION, SOLUTION INTRAVENOUS at 14:10

## 2022-05-11 RX ADMIN — SODIUM CHLORIDE, PRESERVATIVE FREE 10 ML: 5 INJECTION INTRAVENOUS at 14:05

## 2022-05-11 RX ADMIN — ZOLEDRONIC ACID 5 MG: 5 INJECTION, SOLUTION INTRAVENOUS at 14:15

## 2022-05-11 NOTE — PROGRESS NOTES
[facility] Progress Note    Date: May 11, 2022    Name: Katheryn Robles    MRN: [id number]         : 1945    Ms. Tiffany Rios arrived in the [facility] today at [phone], in stable condition, here for her RECLAST Infusion (YEARLY). She was assessed and education was provided. Ms. Garay's vitals were reviewed. Visit Vitals  /72 (BP 1 Location: Right upper arm, BP Patient Position: Sitting)   Pulse 60   Temp 97.8 °F (36.6 °C)   Resp 24   Ht 5' 7\" (1.702 m)   Wt 88.9 kg (196 lb)   SpO2 96%   Breastfeeding No   BMI 30.70 kg/m²       Ms. Tiffany Rios stated that she has been tolerating the IV RECLAST without any side effects. Lab results were obtained and reviewed. Her pre-RECLAST labs listed below, were noted to be satisfactory for treatment today. Results for Car Ashley (MRN [id number])    Ref. Range 2022 13:30   Sodium Latest Ref Range: 136 - 145 mmol/L 145   Potassium Latest Ref Range: 3.5 - 5.5 mmol/L 3.9   Chloride Latest Ref Range: 100 - 111 mmol/L 110   CO2 Latest Ref Range: 21 - 32 mmol/L 30   Anion gap Latest Ref Range: 3.0 - 18 mmol/L 5   Glucose Latest Ref Range: 74 - 99 mg/dL 84   BUN Latest Ref Range: 7.0 - 18 MG/DL 10   Creatinine Latest Ref Range: 0.6 - 1.3 MG/DL 0.84   BUN/Creatinine ratio Latest Ref Range: 12 - 20   12   Calcium Latest Ref Range: 8.5 - 10.1 MG/DL 8.9   GFR est non-AA Latest Ref Range: >60 ml/min/1.73m2 >60   GFR est AA Latest Ref Range: >60 ml/min/1.73m2 >60   Bilirubin, total Latest Ref Range: 0.2 - 1.0 MG/DL 0.6   Protein, total Latest Ref Range: 6.4 - 8.2 g/dL 6.6   Albumin Latest Ref Range: 3.4 - 5.0 g/dL 3.8   Globulin Latest Ref Range: 2.0 - 4.0 g/dL 2.8   A-G Ratio Latest Ref Range: 0.8 - 1.7   1.4   ALT Latest Ref Range: 13 - 56 U/L 15   AST Latest Ref Range: 10 - 38 U/L 11   Alk. phosphatase Latest Ref Range: 45 - 117 U/L 80           PIV # 25 G was established in her posterior LEFT hand at 1405 without incident, and brisk blood return was obtained.        NS 250 ml IV BAG was initiated to infuse @ [facility] throughout treatment today. Zoledronic Acid (RECLAST) 5 mg IV, was administered over approximately 15 minutes, per order and without incident. TYLENOL 650 mg was administered PO per order, after completion of the IV RECLAST. After completion of the IV RECLAST, her PIV was flushed well per policy with NS, and then was removed and gauze/coban was applied. Ms. Bhavani Arriaza tolerated treatment very well today, and voiced no complaints. Ms. Bhavani Arriaza was discharged from [place] [address] in stable condition at 1500. She is to return in 1 year, on Tuesday, 5-9-23 at 1300, for her next appointment, for pre-RECLAST Labs. And then, she is scheduled to return on Wednesday, 5-10-23 at 1400, for her next RECLAST infusion.      Andrew Hardy RN  May 11, 2022  1:58 PM

## 2022-06-07 ENCOUNTER — OFFICE VISIT (OUTPATIENT)
Dept: PULMONOLOGY | Age: 77
End: 2022-06-07
Payer: MEDICARE

## 2022-06-07 VITALS
BODY MASS INDEX: 30.17 KG/M2 | HEIGHT: 67 IN | TEMPERATURE: 97.1 F | DIASTOLIC BLOOD PRESSURE: 72 MMHG | WEIGHT: 192.2 LBS | OXYGEN SATURATION: 96 % | SYSTOLIC BLOOD PRESSURE: 120 MMHG | HEART RATE: 60 BPM | RESPIRATION RATE: 16 BRPM

## 2022-06-07 DIAGNOSIS — J47.0 BRONCHIECTASIS WITH ACUTE LOWER RESPIRATORY INFECTION (HCC): Primary | ICD-10-CM

## 2022-06-07 DIAGNOSIS — K21.9 GASTROESOPHAGEAL REFLUX DISEASE WITHOUT ESOPHAGITIS: ICD-10-CM

## 2022-06-07 DIAGNOSIS — Z23 ENCOUNTER FOR IMMUNIZATION: ICD-10-CM

## 2022-06-07 DIAGNOSIS — I48.0 PAF (PAROXYSMAL ATRIAL FIBRILLATION) (HCC): ICD-10-CM

## 2022-06-07 DIAGNOSIS — J40 BRONCHITIS: ICD-10-CM

## 2022-06-07 PROCEDURE — 90677 PCV20 VACCINE IM: CPT | Performed by: INTERNAL MEDICINE

## 2022-06-07 PROCEDURE — G8427 DOCREV CUR MEDS BY ELIG CLIN: HCPCS | Performed by: INTERNAL MEDICINE

## 2022-06-07 PROCEDURE — G0009 ADMIN PNEUMOCOCCAL VACCINE: HCPCS | Performed by: INTERNAL MEDICINE

## 2022-06-07 PROCEDURE — G8417 CALC BMI ABV UP PARAM F/U: HCPCS | Performed by: INTERNAL MEDICINE

## 2022-06-07 PROCEDURE — 1101F PT FALLS ASSESS-DOCD LE1/YR: CPT | Performed by: INTERNAL MEDICINE

## 2022-06-07 PROCEDURE — 1123F ACP DISCUSS/DSCN MKR DOCD: CPT | Performed by: INTERNAL MEDICINE

## 2022-06-07 PROCEDURE — 99214 OFFICE O/P EST MOD 30 MIN: CPT | Performed by: INTERNAL MEDICINE

## 2022-06-07 PROCEDURE — G8432 DEP SCR NOT DOC, RNG: HCPCS | Performed by: INTERNAL MEDICINE

## 2022-06-07 PROCEDURE — 1090F PRES/ABSN URINE INCON ASSESS: CPT | Performed by: INTERNAL MEDICINE

## 2022-06-07 PROCEDURE — G8752 SYS BP LESS 140: HCPCS | Performed by: INTERNAL MEDICINE

## 2022-06-07 PROCEDURE — G8536 NO DOC ELDER MAL SCRN: HCPCS | Performed by: INTERNAL MEDICINE

## 2022-06-07 PROCEDURE — G8754 DIAS BP LESS 90: HCPCS | Performed by: INTERNAL MEDICINE

## 2022-06-07 NOTE — PROGRESS NOTES
Elias Jj is a 67 y.o. female who presents for routine immunizations. She denies any symptoms , reactions or allergies that would exclude them from being immunized today. Risks and adverse reactions were discussed and the VIS was given to them. All questions were addressed. She was observed for 10 min post injection. There were no reactions observed.     Aron Vázquez LPN

## 2022-06-07 NOTE — PATIENT INSTRUCTIONS
Pneumococcal Conjugate Vaccine (PCV20): What You Need to Know  Why get vaccinated? Pneumococcal conjugate vaccine (PCV 20) can prevent pneumococcal disease. Pneumococcal disease refers to any illness caused by pneumococcal bacteria. These bacteria can cause many types of illnesses, including pneumonia, which is an infection of the lungs. Pneumococcal bacteria are one of the most common causes of pneumonia. Besides pneumonia, pneumococcal bacteria can also cause:  · Ear infections  · Sinus infections  · Meningitis (infection of the tissue covering the brain and spinal cord)  · Bacteremia (infection of the blood)  Anyone can get pneumococcal disease, but children under 3years old, people with certain medical conditions, adults 72 years or older, and cigarette smokers are at the highest risk. Most pneumococcal infections are mild. However, some can result in long-term problems, such as brain damage or hearing loss. Meningitis, bacteremia, and pneumonia caused by pneumococcal disease can be fatal.  PCV 20  PCV20  protects against 20types of bacteria that cause pneumococcal disease. This vaccine may be given to healthy adults 72 years or older who did not already receive PCV 20, based on discussions between the patient and health care provider. Talk with your health care provider  Tell your vaccination provider if the person getting the vaccine:  · Has had an allergic reaction after a previous dose of PCV20, to an earlier pneumococcal conjugate vaccine known as PCV7,PCV 15 or to any vaccine containing diphtheria toxoid (for example, DTaP), or has any severe, life-threatening allergies  In some cases, your health care provider may decide to postpone PCV13 vaccination until a future visit. People with minor illnesses, such as a cold, may be vaccinated. People who are moderately or severely ill should usually wait until they recover before getting PCV20.   Your health care provider can give you more information. Risks of a vaccine reaction  · Redness, swelling, pain, or tenderness where the shot is given, and fever, loss of appetite, fussiness (irritability), feeling tired, headache, and chills can happen after PCV 20  vaccination. The [employer]fuelpowered[employer]) children may be at increased risk for seizures caused by fever after PCV 20  if it is administered at the same time as inactivated influenza vaccine. Ask your health care provider for more information. People sometimes faint after medical procedures, including vaccination. Tell your provider if you feel dizzy or have vision changes or ringing in the ears. As with any medicine, there is a very remote chance of a vaccine causing a severe allergic reaction, other serious injury, or death. What if there is a serious problem? An allergic reaction could occur after the vaccinated person leaves the clinic. If you see signs of a severe allergic reaction (hives, swelling of the face and throat, difficulty breathing, a fast heartbeat, dizziness, or weakness), call 9-1-1 and get the person to the nearest hospital.  For other signs that concern you, call your health care provider. Adverse reactions should be reported to the Vaccine Adverse Event Reporting System (VAERS). Your health care provider will usually file this report, or you can do it yourself. Visit the VAERS website at www.vaers. hhs.gov or call 8-838.379.8214. VAERS is only for reporting reactions, and VAERS staff members do not give medical advice. The National Vaccine Injury Compensation Program  The National Vaccine Injury Compensation Program (VICP) is a federal program that was created to compensate people who may have been injured by certain vaccines. Claims regarding alleged injury or death due to vaccination have a time limit for filing, which may be as short as two years. Visit the VICP website at www.hrsa.gov/vaccinecompensation or call 8-775.257.1343 to learn about the program and about filing a claim.   How can I learn more? 1. Ask your health care provider. 2. Call your local or state health department. 3. Visit the website of the Food and Drug Administration (FDA) for vaccine package inserts and additional information at www.fda.gov/vaccines-blood-biologics/vaccines. 4. Contact the Centers for Disease Control and Prevention (CDC):  ? Call 0-287.254.9947 (1-800-CDC-INFO) or  ? Visit CDC's website at www.cdc.gov/vaccines. Vaccine Information Statement  PCV 20  Department of Health and Human Services  Centers for Disease Control and Prevention  Many vaccine information statements are available in [place]ic and other languages. See www.immunize.org/vis  Hojas de información sobre vacunas están disponibles en español y en muchos otros idiomas. Visite www.immunize.org/vis  Care instructions adapted under license by [employer] (which disclaims liability or warranty for this information). If you have questions about a medical condition or this instruction, always ask your healthcare professional. [place] [address] any warranty or liability for your use of this information. Bronchiectasis: Care Instructions  Your Care Instructions  Bronchiectasis (say \"vkfgh-mqs-CVB-tuh-heraclio\") is a lung problem in which the breathing tubes are stretched and become larger. The buildup of mucus causes the stretching and can lead to swelling and infections. You may find it harder to breathe and cough up mucus out of your lungs. Some people are born with it. Other people get it because of another health problem, usually cystic fibrosis or a lung infection such as pneumonia or tuberculosis. Symptoms are often different for everyone. But a cough that brings up mucus, or sputum, is common. The condition is usually treated with antibiotics, medicines to relax the airways (bronchodilators), and medicines to make it easier to cough up mucus (expectorants). Follow-up care is a key part of your treatment and safety. Be sure to make and go to all appointments, and call your doctor if you are having problems. It's also a good idea to know your test results and keep a list of the medicines you take. How can you care for yourself at home? · Take your antibiotics as directed. Do not stop taking them just because you feel better. You need to take the full course of antibiotics. · Take your medicines exactly as prescribed. Call your doctor if you think you are having a problem with your medicine. · If you have cystic fibrosis, follow your treatment plan. · If you or your child has bronchiectasis, follow directions from your doctor or respiratory therapist for moving your or your child's body into different positions to help drain fluid. This is called postural drainage, and it helps to ease breathing and prevent infections. · You also may do chest percussion on your child. This is strong clapping of the chest with a cupped hand to vibrate the airways in the lungs. The vibration helps your child cough up mucus. You may see a respiratory therapist to learn how to do chest percussion. · Use an airway clearance device, such as a flutter valve, as directed to help remove mucus from the lungs. · Avoid lung infections. ? Get shots to protect against the flu and pneumococcal disease. ? Wash your hands frequently. ? Avoid close contact with people who have colds or the flu. ? Do not smoke or allow others to smoke around you. If you need help quitting, talk to your doctor about stop-smoking programs and medicines. These can increase your chances of quitting for good. ? Stay inside, if you can, on days when the pollution level is high. When should you call for help? Call 911 anytime you think you may need emergency care. For example, call if:    · You have severe trouble breathing.     · You cough up more than 1 cup of blood.    Call your doctor now or seek immediate medical care if:    · You have chest pain.     · You have shortness of breath.     · You have a fever.     · Your mucus (sputum) is bloody or changes color. Watch closely for changes in your health, and be sure to contact your doctor if:    · You are coughing up more sputum than before.     · Your symptoms get worse or do not get better with treatment. Where can you learn more? Go to http://www.gray.com/  Enter C667 in the search box to learn more about \"Bronchiectasis: Care Instructions. \"  Current as of: July 6, 2021               Content Version: 13.2  © 9879-8640 [employer]. Care instructions adapted under license by [employer] (which disclaims liability or warranty for this information). If you have questions about a medical condition or this instruction, always ask your healthcare professional. [address] any warranty or liability for your use of this information.

## 2022-06-07 NOTE — LETTER
6/7/2022    Patient: Yadi Chanel   YOB: 1945   Date of Visit: 6/7/2022     Tamy Adair MD  [address] [place] [id number]  Via In Basket    Dear Tamy Adair MD,      Thank you for referring Ms. Jennifer Soriano to [address] [place] [address] for evaluation. My notes for this consultation are attached. If you have questions, please do not hesitate to call me. I look forward to following your patient along with you.       Sincerely,    Eron Rosales MD

## 2022-06-07 NOTE — PROGRESS NOTES
Yadi Chanel presents today for   Chief Complaint   Patient presents with    Follow Up Chronic Condition       Is someone accompanying this pt? No    Is the patient using any DME equipment during OV? No    -DME Company NA    Depression Screening:  3 most recent PHQ Screens 9/23/2021   PHQ Not Done -   Little interest or pleasure in doing things Not at all   Feeling down, depressed, irritable, or hopeless Not at all   Total Score PHQ 2 0       Learning Assessment:  Learning Assessment 10/7/2015   PRIMARY LEARNER Patient   PRIMARY LANGUAGE ENGLISH   LEARNER PREFERENCE PRIMARY LISTENING   ANSWERED BY patient   RELATIONSHIP SELF       Abuse Screening:  Abuse Screening Questionnaire 9/23/2021   Do you ever feel afraid of your partner? N   Are you in a relationship with someone who physically or mentally threatens you? N   Is it safe for you to go home? Y       Fall Risk  Fall Risk Assessment, last 12 mths 9/23/2021   Able to walk? Yes   Fall in past 12 months? 0   Do you feel unsteady? 0   Are you worried about falling 0   Number of falls in past 12 months -   Fall with injury? -         Coordination of Care:  1. Have you been to the ER, urgent care clinic since your last visit? Hospitalized since your last visit? No    2. Have you seen or consulted any other health care providers outside of the [address] [place] [address] since your last visit? Include any pap smears or colon screening.  No

## 2022-06-07 NOTE — PROGRESS NOTES
Progress Note:Follow up Visit    06/07/22     Patient with episode of discolored mucus with hemoptysis few months ago. Since then she has not had any further episodes. She has not had any increase in shortness of breath wheezing, no fever chills or night sweats  Denies weight loss  She has not used any inhalers.-Has ProAir on hand  Has not had any ER visits. Has been following up with cardiology and is now on Junious Gaucher and continues to remain on Eliquis  Last imaging studies were in July 2018  Last PFT in April 2017  No interim PFTs  She has received Covid vaccine but has not received conjugate pneumococcal vaccine.     HPI:   Ms. Vital Friend who carries history of COPD, recurrent bronchitis requiring visit to Urgent care, Diagnosis of PE on NOAC , Cardiomyopathy S/P pacemaker and defibrillator, and history of left breast cancer is evaluated for COPD/Chronic bronchitis work up. Last CT performed in MAY 2016 demonstrated PE, nonspecific mediastinal adenopathy, minimal atelectasis/small effusion, and hyperinflation. We have obtained follow up CT chest performed on 09/12/16 did not reveal any finding concerning PE or Mediastinal adenopathy. . During this interval time patient has not noticed any symptoms concerning COPD exacerbation. Her  RAMOS remains stable. 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 Subsequent follow ups- never has had TB. Allergies   Allergen Reactions    Lisinopril Rash     Hypertensive crisis    Tetracycline Anaphylaxis, Rash and Swelling    Hibiclens [Chlorhexidine Gluconate] Other (comments)     Turned red and BP drop low.     Other Medication Rash and Swelling     -Myacins     Current Outpatient Medications   Medication Sig Dispense Refill    metoprolol tartrate (LOPRESSOR) 100 mg IR tablet TAKE 1 TABLET BY MOUTH TWICE DAILY 180 Tablet 3    dilTIAZem ER (CARDIZEM CD) 240 mg capsule TAKE 1 CAPSULE BY MOUTH ONCE DAILY 90 Capsule 3    pravastatin (PRAVACHOL) 80 mg tablet TAKE 1 TABLET BY MOUTH NIGHTLY. 30 Tablet 6    Eliquis 5 mg tablet TAKE 1 TABLET BY MOUTH TWO (2) TIMES A DAY. 120 Tablet 3    albuterol sulfate (ProAir RespiClick) 90 mcg/actuation breath activated inhaler Take 2 Puffs by inhalation every four (4) hours as needed for Cough. 1 Inhaler 3    escitalopram oxalate (LEXAPRO) 20 mg tablet Take 20 mg by mouth daily. Review of Systems   Constitutional: Negative. HENT: Negative. Eyes: Negative. Respiratory: cough, SOB  Cardiovascular: Negative. Gastrointestinal: Negative. Genitourinary: Negative. Musculoskeletal: Negative. Skin: Negative. Neurological: Negative. Endo/Heme/Allergies: Negative. Psychiatric/Behavioral: depression      Blood pressure 120/72, pulse 60, temperature 97.1 °F (36.2 °C), temperature source Oral, resp. rate 16, height 5' 7\" (1.702 m), weight 87.2 kg (192 lb 3.2 oz), SpO2 96 %. Physical Exam   Constitutional: She is oriented to person, place, and time and well-developed, well-nourished, and in no distress. HENT:   Head: Normocephalic and atraumatic. Eyes: EOM are normal. Pupils are equal, round, and reactive to light. Neck: Normal range of motion. Neck supple. No thyromegaly present. Chest-kyphotic curvature  Cardiovascular: Normal rate and regular rhythm. Pulmonary/Chest: Effort normal. She has no wheezes. She has no rales. Results from [place] encounter on 10/01/20    CT CHEST WO CONT    Narrative  EXAM:  CT Chest without Contrast.    CLINICAL INDICATION:    - J47.0 (ICD-10-CM) - Bronchiectasis with acute lower respiratory infection  - Lung nodule. COMPARISON:  No prior CTs available. Most recent available chest x-ray dated  3/28/11.     TECHNIQUE:    Helically scanned volumetric axial sections of the chest are obtained without IV  contrast administration using current institutional modified standard HRCT  protocol, i.e. supine end-inspiratory phase, supine end-expiratory phase and  prone end-inspiratory phase scans with axial 1.5 mm reformats in 10 mm  increments. Coronal and sagittal multiplanar reconstruction images are  generated for improved anatomic delineation. Radiation dose optimization techniques are utilized as appropriate to the exam,  with combination of automated exposure control, adjustment of the mA and/or kV  according to patient's size (Including appropriate matching for site-specific  examinations), or use of iterative reconstruction technique. FINDINGS:    Lungs:    - There is subtle peripheral intralobular septal thickening/ reticulation  bilaterally at the lung bases without significant interval changes compared to  prior studies. No honeycombing. No definite bronchiectatic changes are  observed. - Focal area of scarring in the lingular region seen as a focus of wedge shaped  flat density.  - 0.2 cm nodule in the right lower lobe (axial #24). Although not mentioned  specifically on the prior studies, this density was present on the prior studies  as far back as 02/16/16.  - No infiltrate or consolidation is identified.  - No dominant lung mass. Pleura:  No significant pleural effusion is detected bilaterally. Mediastinum:  No adenopathy. Base of Neck, Chest Wall:  No adenopathy. ICD hub in the left upper torso. Chronic left breast medial aspect relatively thick-walled fluid collection  measuring about 3.8 x 2.8 cm. Probably related to chronic seroma related to  prior left breast surgery. Esophagus:  Mild hiatal hernia. Upper Abdomen:  No acute abnormalities. Bones:  Developmental variant anatomy in the lower thoracic region with 2 tandem  foci of partial fusion or incomplete segmentation of the vertebrae, i.e. T10-11  and T12-L1. Impression  IMPRESSION:    1. Slight bilateral lung base subtle peripheral intralobular septal thickening/  reticulation, stable compared to prior studies, nonspecific.     2.  Stable 0.2 cm nodule in the right lower lobe. 3.  Stable left breast chronic seroma. 4.  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 CT chest (05/10/16): There are filling defects in the upper, middle, and lower lobe branches of the right pulmonary artery. No evidence of embolus in the left pulmonary artery branches or in the central trunks. The main pulmonary artery measures 3.3 cm in diameter. The thoracic aorta is not aneurysmal.  No evidence for dissection.      Lymph nodes: Mediastinal lymph nodes are mildly prominent. A right paratracheal lymph node measures 1.2 x 0.9 cm. Previously, this lymph node measured 1.8 x 0.4 cm. There is no evidence of hilar lymphadenopathy.      Thyroid: Normal in size without mass in the visualized parenchyma. .      Mediastinum: There is a pacemaker in the left chest wall. A fluid collection in the inferior left breast measures about 3.3 x 4.8 cm (previously, 2.8 x 4.4 cm). There is no evidence of peripheral enhancement. 2 adjacent biopsy clips are redemonstrated. There is a small hiatal hernia.      Lungs:    The lungs are diffusely hyperinflated with mild tracheobronchomalacia. Right Lung: There is a small posterior effusion with overlying atelectasis. No confluent infiltrate to suggest infarct.      Left Lung:  No evidence of infiltrate. There is a small focus of atelectasis in the posterior lingula. This is similar to previous exam. No pleural effusion.      Abdomen: There is diffuse fatty atrophy of the pancreas. Visualized portions of the liver, spleen, pancreas, adrenal glands, and kidneys are unremarkable. The gallbladder is normal.      Bones: A fracture deformity in the left anterior fifth rib is unchanged. Degenerative changes of the spine are stable. 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 There are no destructive lesions.      IMPRESSION:      1.  Multiple right pulmonary emboli.  No evidence of left pulmonary emboli or pulmonary infarcts. .   2.  Small right pleural effusion. 3. Prominence of the main portal artery is suggestive of pulmonary artery hypertension. 4. COPD and mild tracheobronchomalacia. 5. Similar size of seroma in the left chest wall.    .          PFT(2008): mild obstructive changes   DATE 4/19/2017  Pre bronchodilator ( 2008) Post Bronchodilator (2008)   FVC 2. 11( 69)  2.12(67) 2.13(67)   FEV1 1.60( 69)  1.69(73) 1.64(71)   FEV1/FVC 76  79(73) 76   TLC 4.04(78)  4.32(84)     RV 2.14(79)  2.12(107)     RV/TLC   49(38)     DLCO 13.76( 61)  12.47(66)        PFT 4/19/17:  Mild to moderate restrictive impairment with reduced DLCO. Assessment:    Bronchiectasis: Recurrent bronchitis/COPD exacerbation with Mild-Moderate Restrictive impairment ? sequelae of prior inflammatory process. Recurrant bronchitis- lower respiratory tract infections treated with antibiotics. Elevated IgE raises ? Allergic triggers and possible role for additional therapy. Symptoms are more consistent with chronic persistent bronchitis with cultures growing heavy normal garret. HRCT with nonspecific findings. Overall symptoms are now well controlled with as needed bronchodilators and airway clearance measures  History of COPD - exertional hypoxemia in past has now improved. PFTs with predominant restrictive ventilatory impairment likely from combination of bronchiectasis and kyphosis  Nonspecific Mediastinal adenopathy- S/P follow up CT chest demonstrated interval resolution  9/2016 CT. most recent CT scan from 10/1/2020 with nonspecific interstitial septal thickening and a 2 mm right lower lobe nodule. PE on NOAC ,5/206. Resolved on follow up CT in 9/2016. Atrial fibrillation- on anticoagulation, cardizem for rate control  H/o breast biopsy- 2010.  H/o radiation           Plan:  Continue with current treatment  Action plan for acute exacerbations discussed  If continues to remain symptomatic and fails to improve will obtain pulmonary function test and consider adjusting bronchodilator therapy  Continue Albuterol two puffs, every six hours as needed -predominantly for mucociliary clearance-refills ordered. Continue ELIQUIS as prescribed . Cardiology following for FROYLAN Zamora  Encouraged active lifestyle and endurance activities. Preventive vaccinations-discussed need for conjugate vaccine since records only show that she has received PPSV in 2016. Patient given information on vaccine and agreed to receive it today-Prevnar 20 ordered  RTC in 6 months time and sooner if needed.   Discussed at length current condition, pathophysiology, care plan including testing and treatment options with patient        Dia Herrera  Pulmonary and Critical Care Medicine

## 2022-06-09 ENCOUNTER — TELEPHONE (OUTPATIENT)
Dept: PULMONOLOGY | Age: 77
End: 2022-06-09

## 2022-06-09 NOTE — TELEPHONE ENCOUNTER
Pt called to let Dr. Hal Foster know that she has been cleared from the [facility]. They were waiting for her eye doctor to clear her first and that her license has been reinstated. Pt also says thank you.

## 2022-07-11 ENCOUNTER — TELEPHONE (OUTPATIENT)
Dept: CARDIOLOGY CLINIC | Age: 77
End: 2022-07-11

## 2022-07-11 NOTE — TELEPHONE ENCOUNTER
Patient call in regards to her Pacer appt. Advise Pt that she has an appointment on 9/22/22 at 1:00 p.m. with Dr Karl Whiting and device check same day. Patient verbalized understanding.

## 2022-08-31 RX ORDER — PRAVASTATIN SODIUM 80 MG/1
TABLET ORAL
Qty: 30 TABLET | Refills: 6 | Status: SHIPPED | OUTPATIENT
Start: 2022-08-31

## 2022-10-19 ENCOUNTER — OFFICE VISIT (OUTPATIENT)
Dept: CARDIOLOGY CLINIC | Age: 77
End: 2022-10-19

## 2022-10-19 VITALS
DIASTOLIC BLOOD PRESSURE: 66 MMHG | OXYGEN SATURATION: 97 % | HEART RATE: 60 BPM | BODY MASS INDEX: 28.72 KG/M2 | HEIGHT: 67 IN | SYSTOLIC BLOOD PRESSURE: 114 MMHG | WEIGHT: 183 LBS

## 2022-10-19 DIAGNOSIS — J44.9 CHRONIC OBSTRUCTIVE PULMONARY DISEASE, UNSPECIFIED COPD TYPE (HCC): ICD-10-CM

## 2022-10-19 DIAGNOSIS — R59.0 MEDIASTINAL ADENOPATHY: ICD-10-CM

## 2022-10-19 DIAGNOSIS — I26.99 OTHER ACUTE PULMONARY EMBOLISM WITHOUT ACUTE COR PULMONALE (HCC): ICD-10-CM

## 2022-10-19 PROCEDURE — G8427 DOCREV CUR MEDS BY ELIG CLIN: HCPCS | Performed by: INTERNAL MEDICINE

## 2022-10-19 PROCEDURE — 1101F PT FALLS ASSESS-DOCD LE1/YR: CPT | Performed by: INTERNAL MEDICINE

## 2022-10-19 PROCEDURE — G8754 DIAS BP LESS 90: HCPCS | Performed by: INTERNAL MEDICINE

## 2022-10-19 PROCEDURE — 1123F ACP DISCUSS/DSCN MKR DOCD: CPT | Performed by: INTERNAL MEDICINE

## 2022-10-19 PROCEDURE — G8417 CALC BMI ABV UP PARAM F/U: HCPCS | Performed by: INTERNAL MEDICINE

## 2022-10-19 PROCEDURE — G8536 NO DOC ELDER MAL SCRN: HCPCS | Performed by: INTERNAL MEDICINE

## 2022-10-19 PROCEDURE — 1090F PRES/ABSN URINE INCON ASSESS: CPT | Performed by: INTERNAL MEDICINE

## 2022-10-19 PROCEDURE — G8752 SYS BP LESS 140: HCPCS | Performed by: INTERNAL MEDICINE

## 2022-10-19 PROCEDURE — G8510 SCR DEP NEG, NO PLAN REQD: HCPCS | Performed by: INTERNAL MEDICINE

## 2022-10-19 PROCEDURE — 99215 OFFICE O/P EST HI 40 MIN: CPT | Performed by: INTERNAL MEDICINE

## 2022-10-19 RX ORDER — METOPROLOL TARTRATE 100 MG/1
100 TABLET ORAL 2 TIMES DAILY
Qty: 180 TABLET | Refills: 3 | Status: SHIPPED | OUTPATIENT
Start: 2022-10-19

## 2022-10-19 RX ORDER — DILTIAZEM HYDROCHLORIDE 240 MG/1
240 CAPSULE, COATED, EXTENDED RELEASE ORAL DAILY
Qty: 90 CAPSULE | Refills: 3 | Status: SHIPPED | OUTPATIENT
Start: 2022-10-19

## 2022-10-19 RX ORDER — APIXABAN 5 MG/1
5 TABLET, FILM COATED ORAL 2 TIMES DAILY
Qty: 180 TABLET | Refills: 3 | Status: SHIPPED | OUTPATIENT
Start: 2022-10-19

## 2022-10-19 NOTE — PROGRESS NOTES
Marcela Cifuentes presents today for   Chief Complaint   Patient presents with    Follow-up     6 month follow up    Pacemaker [address] preferred language for health care discussion is english/other. Is someone accompanying this pt? no    Is the patient using any DME equipment during [address]? no    Depression Screening:  3 most recent PHQ Screens 10/19/2022   PHQ Not Done -   Little interest or pleasure in doing things Not at all   Feeling down, depressed, irritable, or hopeless Not at all   Total Score PHQ 2 0       Learning Assessment:  Learning Assessment 10/19/2022   PRIMARY LEARNER Patient   PRIMARY LANGUAGE ENGLISH   LEARNER PREFERENCE PRIMARY DEMONSTRATION   ANSWERED BY patient   RELATIONSHIP SELF       Abuse Screening:  Abuse Screening Questionnaire 10/19/2022   Do you ever feel afraid of your partner? N   Are you in a relationship with someone who physically or mentally threatens you? N   Is it safe for you to go home? Y       Fall Risk  Fall Risk Assessment, last 12 mths 10/19/2022   Able to walk? Yes   Fall in past 12 months? 0   Do you feel unsteady? 0   Are you worried about falling 0   Number of falls in past 12 months -   Fall with injury? -           Pt currently taking Anticoagulant therapy? Eliquis 5 mg twice a day    Pt currently taking Antiplatelet therapy ? no      Coordination of Care:  1. Have you been to the ER, urgent care clinic since your last visit? Hospitalized since your last visit? no    2. Have you seen or consulted any other health care providers outside of the [address] [place] [address] since your last visit? Include any pap smears or colon screening.  no

## 2022-10-20 NOTE — PROGRESS NOTES
Bea Resendez    Chief Complaint   Patient presents with    Follow-up     6 month follow up    Pacemaker Check     Medtronic       HPI    Bea Resendez is a 67 y.o. with h/o cardiomyopathy, ICD, pAFib here for routine follow up care. She feels fine from a CV standpoint, no complaints at all. She recently moved to [place] and seems happier with less maintenance and more support- especially caring for her . The interrogation of her ICD demonstrated normal OptiVol and no tachyarrhythmia. There is no atrial fibrillation. She does have a great deal of knee pain and contemplating knee surgery in the future. She has history of a left bundle branch block, hypertension, dyslipidemia and glucose intolerance. She has a 20 pack a year cigarette smoking history until 1988. She was told that she had a heart murmur and hole in her heart as a child, but this has not been clearly documented by echocardiogram or other diagnostic workup. She was seen by Dr. Zacahry Hogan in 2003, for the evaluation of left bundle branch block and had negative stress nuclear cardiac imaging. She was told that everything was okay by Dr. Zachary Hogan at that time. She had repeat stress nuclear cardiac imaging on September 17, 2007, which demonstrated a moderate, fixed perfusion defect in the basal inferior and mid inferior wall, involving the inferior apex as well, with no significant ischemia. There was also moderately severe scarring in the anterior wall, anterior apex and anterior septum, with very mild associated ischemia. There was an akinetic interventricular septum and anterior apex, and mild hypokinesis of the proximal anterior wall, with moderate hypokinesis of the entire inferior wall, with overall EF in the 25% range. She subsequently underwent cardiac catheterization on February 27, 2008 which demonstrated patent coronary arteries, but severe LV dysfunction with EF in the 25-30% range.  She then went on to have biventricular ICD implantation on May 12, 2008. Her EF has improved to 56% by repeat echocardiogram in 2009. She had a repeat echocardiogram on August 13, 2010, which demonstrated once again improved LV function, with EF in the 55% range. Left atrial volume was normal at 23 ml/m². PA pressure was estimated normal as well. Because of shortness of breath she had repeat echocardiogram on 4/16/2012 with demonstrated normal LV function with the EF in the 60 to 65% range. There was grade 1 diastolic dysfunction. PA pressure was estimated in the range of 25 to 30 mmHg. There was no significant valvular pathology. She had an ultrasound examination of the aorta, which demonstrated possible aneurysm; however, the CT scan demonstrated no evidence of aneurysm whatsoever. She does not feel the palpitations much herself; however, her ICD interrogation demonstrated frequent episodes of atrial fibrillation, at times, very prolonged for hours. She had problems with worsening shortness of breath and she developed a cough and fever, and was seen in the emergency room on 05/30/16. She was ultimately diagnosed of DVT and pulmonary emboli by CT angiogram and has been placed on Eliquis. Her echocardiogram on 07/07/2016 demonstrated normal left ventricular size and lower limit of normal left ventricular systolic function with EF in the 50% range. There was no significant valvular pathology. Left atrial dimension was normal with a volume index of 25 mL/meter2. There was no significant pulmonary hypertension. She developed rapid atrial fibrillation associated with pneuminia and subsequently shocked by her ICD because of the fast heart rate in July 2018. In the hospital, there was no evidence of decompensated congestive heart failure. She had an echocardiogram on 7/30/18 which demonstrated normal left ventricular systolic function with EF in the 55-60% range. PA pressure was estimated at 40 mmHg. There was no significant valvular pathology. The left atrial dimension was normal with volume index of 31 ml/m sq. The interrogation of the ICD demonstrated once again the episode of left atrial fibrillation and ICD shock on 7/29/18. Her OptiVol was normal indicating normal volume status at that time. Past Medical History:   Diagnosis Date    CAD (coronary artery disease)     cardiomyopathy,CHF,,Cardiac Cath, pacemaker/defib. Cancer [facility]) 2013    breast    Cardiac catheterization 02/27/2008    Patent coronary arteries. LVEDP 13 mmHg. EF 25-30%. Global hypk. Cardiac echocardiogram 07/07/2016    EF 50% (prev 60-65% on study of 4/16/12). No WMA. Gr 1 DDfx. Normal RVSP. Cardiac nuclear imaging test 05/31/2013    No evidence of ongoing ischemia or prior infarction. EF 60%. No RWMA. Mild dyssynchrony of inferior base & mid/distal septum likely c/w pacemaker. Nondiagnostic EKG on pharm stress test due to V-pacing. Cardiovascular abdominal aorta duplex 09/23/2015    Fusiform AAA in prox & mid portions of abdom Ao.     Chronic lung disease     COPD (chronic obstructive pulmonary disease) (HCC) chronic bronchitis    Coronary artery disease Feb. 2008    Possible CAD with nonischemic dilated cardiomyopathy/EF 25%/ improved EF up to 56% by echocardiogram.    Coronary artery disease     Diabetes mellitus (HCC)     GERD (gastroesophageal reflux disease)     Heart failure (HCC)     Heart murmur     Hemangioma of liver     Hx of cardiomyopathy (HCC)     Hypercholesterolemia     Hypertension     Left bundle branch block     Phlebitis     PVD (peripheral vascular disease) ([address]     Renal calculi 1999    Thromboembolus ([address]     below knee in R leg       Past Surgical History:   Procedure Laterality Date    COLONOSCOPY N/A 3/7/2017    COLONOSCOPY, SURVEILLANCE with polypectomy performed by Raquel Beauchamp MD at [facility] ENDOSCOPY    HX BREAST LUMPECTOMY Left 8/2013    cancerous lesion    HX CATARACT REMOVAL Bilateral 1980s    w/ lens implants    HX CATARACT REMOVAL Left     re vised due to implant repositioning    HX HEART CATHETERIZATION  08    Patent coronary arteries, but severe LV dysfunction w/ EF in the 25-30% range. HX KNEE ARTHROSCOPY Right     HX MASTECTOMY Left 2014    LEFT PARTIAL MASTECTOMY WITH NEEDLE LOCALIZATION MAMMOGRAM TIMES TWO performed by Elle Allen MD at [facility] MAIN OR    HX PACEMAKER  May 2008    Biventricular ICD implantation     HX PACEMAKER      replacement - Medtronic    HX PACEMAKER PLACEMENT      HX PELVIC LAPAROSCOPY      diagnostic - varicosities       Current Outpatient Medications   Medication Sig Dispense Refill    Eliquis 5 mg tablet Take 1 Tablet by mouth two (2) times a day. 180 Tablet 3    metoprolol tartrate (LOPRESSOR) 100 mg IR tablet Take 1 Tablet by mouth two (2) times a day. 180 Tablet 3    dilTIAZem ER (CARDIZEM CD) 240 mg capsule Take 1 Capsule by mouth daily. 90 Capsule 3    pravastatin (PRAVACHOL) 80 mg tablet TAKE 1 TABLET BY MOUTH EVERY NIGHT 30 Tablet 6    albuterol sulfate (ProAir RespiClick) 90 mcg/actuation breath activated inhaler Take 2 Puffs by inhalation every four (4) hours as needed for Cough. 1 Each 3    escitalopram oxalate (LEXAPRO) 20 mg tablet Take 20 mg by mouth daily. Allergies   Allergen Reactions    Lisinopril Rash     Hypertensive crisis    Tetracycline Anaphylaxis, Rash and Swelling    Hibiclens [Chlorhexidine Gluconate] Other (comments)     Turned red and BP drop low.     Other Medication Rash and Swelling     -Myacins       Social History     Socioeconomic History    Marital status:      Spouse name: Not on file    Number of children: Not on file    Years of education: Not on file    Highest education level: Not on file   Occupational History    Not on file   Tobacco Use    Smoking status: Former     Packs/day: 1.50     Years: 20.00     Pack years: 30.00     Types: Cigarettes     Start date: 4/15/1963     Quit date: 1988     Years since quittin.6 Smokeless tobacco: Never   Vaping Use    Vaping Use: Never used   Substance and Sexual Activity    Alcohol use: No    Drug use: No    Sexual activity: Not Currently   Other Topics Concern    Not on file   Social History Narrative    Not on file     Social Determinants of Health     Financial Resource Strain: Not on file   Food Insecurity: Not on file   Transportation Needs: Not on file   Physical Activity: Not on file   Stress: Not on file   Social Connections: Not on file   Intimate Partner Violence: Not on file   Housing Stability: Not on file    retired [address],  with dementia, long time [place] pts I have also seen her daughter (who has a different father than her current )    Review of Systems    14 pt Review of Systems is negative unless otherwise mentioned in the HPI. Wt Readings from Last 3 Encounters:   10/19/22 83 kg (183 lb)   06/07/22 87.2 kg (192 lb 3.2 oz)   05/11/22 88.9 kg (196 lb)     Temp Readings from Last 3 Encounters:   06/07/22 97.1 °F (36.2 °C) (Oral)   05/11/22 97.2 °F (36.2 °C)   05/09/22 98.7 °F (37.1 °C)     BP Readings from Last 3 Encounters:   10/19/22 114/66   06/07/22 120/72   05/11/22 92/60     Pulse Readings from Last 3 Encounters:   10/19/22 60   06/07/22 60   05/11/22 60       07/29/18   ECHO ADULT COMPLETE 07/30/2018 7/30/2018    Narrative · Technically difficult study with poor endocardial visualization. Definity contrast was given to enhance imaging. · Estimated left ventricular ejection fraction is 56 - 60%. Left   ventricular mild concentric hypertrophy observed. Normal left ventricular   wall motion, no regional wall motion abnormality noted. Age-appropriate   left ventricular diastolic function. · Mild tricuspid valve regurgitation is present. Pulmonary arterial   systolic pressure is 40 mmHg. Mild pulmonary hypertension is present. · Pacer/ICD present in the right ventricle. · Mitral annular calcification.         Signed by: Shade Smith MD Physical Exam:    Visit Vitals  /66 (BP 1 Location: Left upper arm, BP Patient Position: Sitting, BP Cuff Size: Small adult)   Pulse 60   Ht 5' 7\" (1.702 m)   Wt 83 kg (183 lb)   SpO2 97%   BMI 28.66 kg/m²      Physical Exam  HENT:      Head: Normocephalic and atraumatic. Eyes:      Pupils: Pupils are equal, round, and reactive to light. Cardiovascular:      Rate and Rhythm: Normal rate and regular rhythm. Heart sounds: Normal heart sounds. No murmur heard. No friction rub. No gallop. Pulmonary:      Effort: Pulmonary effort is normal. No respiratory distress. Breath sounds: Normal breath sounds. No wheezing or rales. Chest:      Chest wall: No tenderness. Abdominal:      General: Bowel sounds are normal.      Palpations: Abdomen is soft. Musculoskeletal:         General: No tenderness. Skin:     General: Skin is warm and dry. Neurological:      Mental Status: She is alert and oriented to person, place, and time. Psychiatric:      Comments: Appropriate, but pressured tangential speech, can be very \"picky\" opinionated with some splitting behavior exhibited     Device check    Impression and Plan:  Marcela Cifuentes is a 67 y.o. with:    1.) Cardiomyopathy, more recently back to normal 55%  2.) pAFib, with h/o inapprop ICD shocks, known  3.) ICD, known  4.) h/o DVT/ PE    1.) Doing well on med tx, no changes  2.) RTC 6 months with device check    45 mins, Rx given    Thank you for allowing me to participate in the care of your patient, please do not hesitate to call with questions or concerns.     Kindest Regards,    Eugene Fitzgerald, DO

## 2022-11-23 ENCOUNTER — CLINICAL SUPPORT (OUTPATIENT)
Dept: CARDIOLOGY CLINIC | Age: 77
End: 2022-11-23
Payer: MEDICARE

## 2022-11-23 DIAGNOSIS — Z95.810 BIVENTRICULAR IMPLANTABLE CARDIOVERTER-DEFIBRILLATOR IN SITU: Primary | ICD-10-CM

## 2022-11-23 DIAGNOSIS — I42.9 CARDIOMYOPATHY, IDIOPATHIC (HCC): ICD-10-CM

## 2022-11-23 PROCEDURE — 93289 INTERROG DEVICE EVAL HEART: CPT | Performed by: INTERNAL MEDICINE

## 2022-12-15 ENCOUNTER — TELEPHONE (OUTPATIENT)
Dept: CARDIOLOGY CLINIC | Age: 77
End: 2022-12-15

## 2022-12-15 NOTE — LETTER
12/15/2022 11:34 AM    Ms. Tamiko Mejia  [id number] [employer]        To Whom It May Concern:    12/15/2022    Tamiko Mejia 1945  [facility]      Tamiko Mejia was seen in our office on 10/19/2022 for cardiac evaluation. From a cardiac standpoint she is moderate  risk for procedure with Dr Stefani Haney. Please feel free to contact our office if you have any questions regarding this patient.          Sincerely,            Anil Suarez, DO

## 2022-12-15 NOTE — LETTER
12/15/2022 11:45 AM    Ms. Linden Mora  [address]      Linden Mora was seen in our office on 10/19/2022 for cardiac evaluation. From a cardiac standpoint she is at moderate  risk for procedure with Dr Nora Crocker . She can hold eliquis for 48 hours prior to procedure. Please feel free to contact our office if you have any questions regarding this patient.                        Sincerely,        Dr. Paula Patton

## 2022-12-15 NOTE — TELEPHONE ENCOUNTER
[facility] requesting cardiac clearance and instructions on her eliquis prior to colonoscopy on 1/4/2023    Verbal order and read back per Lolly Senate, DO    Moderate risk for procedure   She can hold eliquis 48 hours prior     Faxed letter back to provider

## 2023-01-03 ENCOUNTER — OFFICE VISIT (OUTPATIENT)
Dept: PULMONOLOGY | Age: 78
End: 2023-01-03
Payer: MEDICARE

## 2023-01-03 VITALS
TEMPERATURE: 97.8 F | HEART RATE: 61 BPM | HEIGHT: 67 IN | WEIGHT: 178.7 LBS | SYSTOLIC BLOOD PRESSURE: 147 MMHG | OXYGEN SATURATION: 96 % | BODY MASS INDEX: 28.05 KG/M2 | DIASTOLIC BLOOD PRESSURE: 73 MMHG | RESPIRATION RATE: 16 BRPM

## 2023-01-03 DIAGNOSIS — J47.0 BRONCHIECTASIS WITH ACUTE LOWER RESPIRATORY INFECTION (HCC): Primary | ICD-10-CM

## 2023-01-03 DIAGNOSIS — I48.0 PAF (PAROXYSMAL ATRIAL FIBRILLATION) (HCC): ICD-10-CM

## 2023-01-03 DIAGNOSIS — Z23 NEEDS FLU SHOT: ICD-10-CM

## 2023-01-03 DIAGNOSIS — K21.9 GASTROESOPHAGEAL REFLUX DISEASE WITHOUT ESOPHAGITIS: ICD-10-CM

## 2023-01-03 PROCEDURE — 1101F PT FALLS ASSESS-DOCD LE1/YR: CPT | Performed by: INTERNAL MEDICINE

## 2023-01-03 PROCEDURE — 1090F PRES/ABSN URINE INCON ASSESS: CPT | Performed by: INTERNAL MEDICINE

## 2023-01-03 PROCEDURE — G8432 DEP SCR NOT DOC, RNG: HCPCS | Performed by: INTERNAL MEDICINE

## 2023-01-03 PROCEDURE — 3077F SYST BP >= 140 MM HG: CPT | Performed by: INTERNAL MEDICINE

## 2023-01-03 PROCEDURE — 99214 OFFICE O/P EST MOD 30 MIN: CPT | Performed by: INTERNAL MEDICINE

## 2023-01-03 PROCEDURE — G0008 ADMIN INFLUENZA VIRUS VAC: HCPCS | Performed by: INTERNAL MEDICINE

## 2023-01-03 PROCEDURE — 3078F DIAST BP <80 MM HG: CPT | Performed by: INTERNAL MEDICINE

## 2023-01-03 PROCEDURE — G8536 NO DOC ELDER MAL SCRN: HCPCS | Performed by: INTERNAL MEDICINE

## 2023-01-03 PROCEDURE — G8427 DOCREV CUR MEDS BY ELIG CLIN: HCPCS | Performed by: INTERNAL MEDICINE

## 2023-01-03 PROCEDURE — 1123F ACP DISCUSS/DSCN MKR DOCD: CPT | Performed by: INTERNAL MEDICINE

## 2023-01-03 PROCEDURE — 90694 VACC AIIV4 NO PRSRV 0.5ML IM: CPT | Performed by: INTERNAL MEDICINE

## 2023-01-03 PROCEDURE — G8417 CALC BMI ABV UP PARAM F/U: HCPCS | Performed by: INTERNAL MEDICINE

## 2023-01-03 NOTE — PROGRESS NOTES
Progress Note:Follow up Visit    01/03/23     Patient reports doing well  She moved to [place] to be closer to her family and get some help taking care of her  who has significant dementia  She has not had any episodes of change in color of mucus or hemoptysis  She has not had any increase in shortness of breath wheezing, no fever chills or night sweats  Denies weight loss  She has not used any inhalers.-Has ProAir on hand  Has not had any ER visits. Has been following up with cardiology and is now on Byron Millin and continues to remain on Eliquis  Last imaging studies were in July 2018  Last PFT in April 2017    Denies additional concerns-remains independent and active     HPI:   Ms. Jeremías Davis who carries history of COPD, recurrent bronchitis requiring visit to Urgent care, Diagnosis of PE on NOAC , Cardiomyopathy S/P pacemaker and defibrillator, and history of left breast cancer is evaluated for COPD/Chronic bronchitis work up. Last CT performed in MAY 2016 demonstrated PE, nonspecific mediastinal adenopathy, minimal atelectasis/small effusion, and hyperinflation. We have obtained follow up CT chest performed on 09/12/16 did not reveal any finding concerning PE or Mediastinal adenopathy. . During this interval time patient has not noticed any symptoms concerning COPD exacerbation. Her  RAMOS remains stable. 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 Subsequent follow ups- never has had TB. Allergies   Allergen Reactions    Lisinopril Rash     Hypertensive crisis    Tetracycline Anaphylaxis, Rash and Swelling    Hibiclens [Chlorhexidine Gluconate] Other (comments)     Turned red and BP drop low. Other Medication Rash and Swelling     -Myacins     Current Outpatient Medications   Medication Sig Dispense Refill    Eliquis 5 mg tablet Take 1 Tablet by mouth two (2) times a day. 180 Tablet 3    metoprolol tartrate (LOPRESSOR) 100 mg IR tablet Take 1 Tablet by mouth two (2) times a day. 180 Tablet 3    dilTIAZem ER (CARDIZEM CD) 240 mg capsule Take 1 Capsule by mouth daily. 90 Capsule 3    pravastatin (PRAVACHOL) 80 mg tablet TAKE 1 TABLET BY MOUTH EVERY NIGHT 30 Tablet 6    albuterol sulfate (ProAir RespiClick) 90 mcg/actuation breath activated inhaler Take 2 Puffs by inhalation every four (4) hours as needed for Cough. 1 Each 3    escitalopram oxalate (LEXAPRO) 20 mg tablet Take 20 mg by mouth daily. Review of Systems   Constitutional: Negative. HENT: Negative. Eyes: Negative. Respiratory: cough, SOB  Cardiovascular: Negative. Gastrointestinal: Negative. Genitourinary: Negative. Musculoskeletal: Negative. Skin: Negative. Neurological: Negative. Endo/Heme/Allergies: Negative. Psychiatric/Behavioral: depression      Blood pressure (!) 153/86, pulse 60, temperature 97.8 °F (36.6 °C), temperature source Temporal, resp. rate 16, height 5' 7\" (1.702 m), weight 81.1 kg (178 lb 11.2 oz), SpO2 96 %. Physical Exam   Constitutional: She is oriented to person, place, and time and well-developed, well-nourished, and in no distress. HENT:   Head: Normocephalic and atraumatic. Eyes: EOM are normal. Pupils are equal, round, and reactive to light. Neck: Normal range of motion. Neck supple. No thyromegaly present. Chest-kyphotic curvature  Cardiovascular: Normal rate and regular rhythm. Pulmonary/Chest: Effort normal. She has no wheezes. She has no rales. Results from [place] encounter on 10/01/20    CT CHEST WO CONT    Narrative  EXAM:  CT Chest without Contrast.    CLINICAL INDICATION:    - J47.0 (ICD-10-CM) - Bronchiectasis with acute lower respiratory infection  - Lung nodule. COMPARISON:  No prior CTs available. Most recent available chest x-ray dated  3/28/11.     TECHNIQUE:    Helically scanned volumetric axial sections of the chest are obtained without IV  contrast administration using current institutional modified standard HRCT  protocol, i.e. supine end-inspiratory phase, supine end-expiratory phase and  prone end-inspiratory phase scans with axial 1.5 mm reformats in 10 mm  increments. Coronal and sagittal multiplanar reconstruction images are  generated for improved anatomic delineation. Radiation dose optimization techniques are utilized as appropriate to the exam,  with combination of automated exposure control, adjustment of the mA and/or kV  according to patient's size (Including appropriate matching for site-specific  examinations), or use of iterative reconstruction technique. FINDINGS:    Lungs:    - There is subtle peripheral intralobular septal thickening/ reticulation  bilaterally at the lung bases without significant interval changes compared to  prior studies. No honeycombing. No definite bronchiectatic changes are  observed. - Focal area of scarring in the lingular region seen as a focus of wedge shaped  flat density.  - 0.2 cm nodule in the right lower lobe (axial #24). Although not mentioned  specifically on the prior studies, this density was present on the prior studies  as far back as 02/16/16.  - No infiltrate or consolidation is identified.  - No dominant lung mass. Pleura:  No significant pleural effusion is detected bilaterally. Mediastinum:  No adenopathy. Base of Neck, Chest Wall:  No adenopathy. ICD hub in the left upper torso. Chronic left breast medial aspect relatively thick-walled fluid collection  measuring about 3.8 x 2.8 cm. Probably related to chronic seroma related to  prior left breast surgery. Esophagus:  Mild hiatal hernia. Upper Abdomen:  No acute abnormalities. Bones:  Developmental variant anatomy in the lower thoracic region with 2 tandem  foci of partial fusion or incomplete segmentation of the vertebrae, i.e. T10-11  and T12-L1. Impression  IMPRESSION:    1.   Slight bilateral lung base subtle peripheral intralobular septal thickening/  reticulation, stable compared to prior studies, nonspecific. 2.  Stable 0.2 cm nodule in the right lower lobe. 3.  Stable left breast chronic seroma. 4.  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 CT chest (05/10/16): There are filling defects in the upper, middle, and lower lobe branches of the right pulmonary artery. No evidence of embolus in the left pulmonary artery branches or in the central trunks. The main pulmonary artery measures 3.3 cm in diameter. The thoracic aorta is not aneurysmal.  No evidence for dissection. Lymph nodes: Mediastinal lymph nodes are mildly prominent. A right paratracheal lymph node measures 1.2 x 0.9 cm. Previously, this lymph node measured 1.8 x 0.4 cm. There is no evidence of hilar lymphadenopathy. Thyroid: Normal in size without mass in the visualized parenchyma. .      Mediastinum: There is a pacemaker in the left chest wall. A fluid collection in the inferior left breast measures about 3.3 x 4.8 cm (previously, 2.8 x 4.4 cm). There is no evidence of peripheral enhancement. 2 adjacent biopsy clips are redemonstrated. There is a small hiatal hernia. Lungs: The lungs are diffusely hyperinflated with mild tracheobronchomalacia. Right Lung: There is a small posterior effusion with overlying atelectasis. No confluent infiltrate to suggest infarct. Left Lung:  No evidence of infiltrate. There is a small focus of atelectasis in the posterior lingula. This is similar to previous exam. No pleural effusion. Abdomen: There is diffuse fatty atrophy of the pancreas. Visualized portions of the liver, spleen, pancreas, adrenal glands, and kidneys are unremarkable. The gallbladder is normal.      Bones: A fracture deformity in the left anterior fifth rib is unchanged. Degenerative changes of the spine are stable. 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 There are no destructive lesions.       IMPRESSION: 1.  Multiple right pulmonary emboli. No evidence of left pulmonary emboli or pulmonary infarcts. .   2.  Small right pleural effusion. 3. Prominence of the main portal artery is suggestive of pulmonary artery hypertension. 4. COPD and mild tracheobronchomalacia. 5. Similar size of seroma in the left chest wall. .          FSU(3760): mild obstructive changes   DATE 4/19/2017  Pre bronchodilator ( 2008) Post Bronchodilator (2008)   FVC 2. 11( 69)  2.12(67) 2.13(67)   FEV1 1.60( 69)  1.69(73) 1.64(71)   FEV1/FVC 76  79(73) 76   TLC 4.04(78)  4.32(84)     RV 2.14(79)  2.12(107)     RV/TLC   49(38)     DLCO 13.76( 61)  12.47(66)        PFT 4/19/17:  Mild to moderate restrictive impairment with reduced DLCO. Assessment:    Bronchiectasis: Recurrent bronchitis/COPD exacerbation with Mild-Moderate Restrictive impairment ? sequelae of prior inflammatory process. Recurrant bronchitis- lower respiratory tract infections treated with antibiotics. Elevated IgE raises ? Allergic triggers and possible role for additional therapy. Symptoms are more consistent with chronic persistent bronchitis with cultures growing heavy normal garret. HRCT with nonspecific findings. Overall symptoms are now well controlled with as needed bronchodilators and airway clearance measures  History of COPD - exertional hypoxemia in past has now improved. PFTs with predominant restrictive ventilatory impairment likely from combination of bronchiectasis and kyphosis  Nonspecific Mediastinal adenopathy- S/P follow up CT chest demonstrated interval resolution  9/2016 CT. most recent CT scan from 10/1/2020 with nonspecific interstitial septal thickening and a 2 mm right lower lobe nodule. PE on NOAC ,5/206. Resolved on follow up CT in 9/2016. Atrial fibrillation- on anticoagulation, cardizem for rate control  H/o breast biopsy- 2010.  H/o radiation           Plan:    Continue with current treatment  Action plan for acute exacerbations discussed  Continue Albuterol two puffs, every six hours as needed -predominantly for mucociliary clearance  Continue ELIQUIS as prescribed . Cardiology following for A. Torin, AICD  Encouraged active lifestyle and endurance activities. Preventive vaccinations-needs influenza vaccine. Ordered high-dose vaccine today. She is up-to-date on pneumonia vaccine  RTC in 6 months time and sooner if needed.   Discussed at length current condition, pathophysiology, care plan including testing and treatment options with patient        Josiane Colunga  Pulmonary and [address]

## 2023-01-03 NOTE — PROGRESS NOTES
Savanna Hutchinson presents today for   Chief Complaint   Patient presents with    Follow Up Chronic Condition       Is someone accompanying this pt? No    Is the patient using any DME equipment during OV? No    -DME Company NA    Depression Screening:  3 most recent PHQ Screens 10/19/2022   PHQ Not Done -   Little interest or pleasure in doing things Not at all   Feeling down, depressed, irritable, or hopeless Not at all   Total Score PHQ 2 0       Learning Assessment:  Learning Assessment 10/19/2022   PRIMARY LEARNER Patient   PRIMARY LANGUAGE ENGLISH   LEARNER PREFERENCE PRIMARY DEMONSTRATION   ANSWERED BY patient   RELATIONSHIP SELF       Abuse Screening:  Abuse Screening Questionnaire 10/19/2022   Do you ever feel afraid of your partner? N   Are you in a relationship with someone who physically or mentally threatens you? N   Is it safe for you to go home? Y       Fall Risk  Fall Risk Assessment, last 12 mths 10/19/2022   Able to walk? Yes   Fall in past 12 months? 0   Do you feel unsteady? 0   Are you worried about falling 0   Number of falls in past 12 months -   Fall with injury? -         Coordination of Care:  1. Have you been to the ER, urgent care clinic since your last visit? Hospitalized since your last visit? No    2. Have you seen or consulted any other health care providers outside of the [address] [place] [address] since your last visit? Include any pap smears or colon screening.  PCP

## 2023-01-03 NOTE — LETTER
1/3/2023    Patient: Vikas Gomez   YOB: 1945   Date of Visit: 1/3/2023     Herlinda Dozier MD  [address] [place] [id number]  Via In Basket    Dear Herlinda Dozier MD,      Thank you for referring Ms. Judy Crawley to [address] [place] [address] for evaluation. My notes for this consultation are attached. If you have questions, please do not hesitate to call me. I look forward to following your patient along with you.       Sincerely,    Haile Vanegas MD

## 2023-01-03 NOTE — PATIENT INSTRUCTIONS
Vaccine Information Statement    Influenza (Flu) Vaccine (Inactivated or Recombinant): What You Need to Know    Many vaccine information statements are available in Mongolian and other languages. See www.immunize.org/vis. Hojas de información sobre vacunas están disponibles en español y en muchos otros idiomas. Visite www.immunize.org/vis. 1. Why get vaccinated? Influenza vaccine can prevent influenza (flu). Flu is a contagious disease that spreads around the [facility] every year, usually between October and May. Anyone can get the flu, but it is more dangerous for some people. Infants and young children, people 72 years and older, pregnant people, and people with certain health conditions or a weakened immune system are at greatest risk of flu complications. Pneumonia, bronchitis, sinus infections, and ear infections are examples of flu-related complications. If you have a medical condition, such as heart disease, cancer, or diabetes, flu can make it worse. Flu can cause fever and chills, sore throat, muscle aches, fatigue, cough, headache, and runny or stuffy nose. Some people may have vomiting and diarrhea, though this is more common in children than adults. In an average year, thousands of people in the [facility] die from flu, and many more are hospitalized. Flu vaccine prevents millions of illnesses and flu-related visits to the doctor each year. 2. Influenza vaccines     CDC recommends everyone 6 months and older get vaccinated every flu season. Children 6 months through 6years of age may need 2 doses during a single flu season. Everyone else needs only 1 dose each flu season. It takes about 2 weeks for protection to develop after vaccination. There are many flu viruses, and they are always changing. Each year a new flu vaccine is made to protect against the influenza viruses believed to be likely to cause disease in the upcoming flu season.  Even when the vaccine doesnt exactly match these viruses, it may still provide some protection. Influenza vaccine does not cause flu. Influenza vaccine may be given at the same time as other vaccines. 3. Talk with your health care provider    Tell your vaccination provider if the person getting the vaccine:  Has had an allergic reaction after a previous dose of influenza vaccine, or has any severe, life-threatening allergies   Has ever had Guillain-Barré Syndrome (also called GBS)    In some cases, your health care provider may decide to postpone influenza vaccination until a future visit. Influenza vaccine can be administered at any time during pregnancy. People who are or will be pregnant during influenza season should receive inactivated influenza vaccine. People with minor illnesses, such as a cold, may be vaccinated. People who are moderately or severely ill should usually wait until they recover before getting influenza vaccine. Your health care provider can give you more information. 4. Risks of a vaccine reaction    Soreness, redness, and swelling where the shot is given, fever, muscle aches, and headache can happen after influenza vaccination. There may be a very small increased risk of Guillain-Barré Syndrome (GBS) after inactivated influenza vaccine (the flu shot). Hildy Paddy children who get the flu shot along with pneumococcal vaccine (PCV13) and/or DTaP vaccine at the same time might be slightly more likely to have a seizure caused by fever. Tell your health care provider if a child who is getting flu vaccine has ever had a seizure. People sometimes faint after medical procedures, including vaccination. Tell your provider if you feel dizzy or have vision changes or ringing in the ears. As with any medicine, there is a very remote chance of a vaccine causing a severe allergic reaction, other serious injury, or death. 5. What if there is a serious problem?     An allergic reaction could occur after the vaccinated person leaves the clinic. If you see signs of a severe allergic reaction (hives, swelling of the face and throat, difficulty breathing, a fast heartbeat, dizziness, or weakness), call 9-1-1 and get the person to the nearest hospital.    For other signs that concern you, call your health care provider. Adverse reactions should be reported to the Vaccine Adverse Event Reporting System (VAERS). Your health care provider will usually file this report, or you can do it yourself. Visit the VAERS website at www.vaers. [facility].gov or call 4-637.849.9275. VAERS is only for reporting reactions, and VAERS staff members do not give medical advice. 6. The National Vaccine Injury Compensation Program    The [facility] Vaccine Injury Compensation Program (VICP) is a federal program that was created to compensate people who may have been injured by certain vaccines. Claims regarding alleged injury or death due to vaccination have a time limit for filing, which may be as short as two years. Visit the VICP website at www.[facility].gov/vaccinecompensation or call 6-450.571.5230 to learn about the program and about filing a claim. 7. How can I learn more? Ask your health care provider. Call your local or state health department. Visit the website of the Food and Drug Administration (FDA) for vaccine package inserts and additional information at www.fda.gov/vaccines-blood-biologics/vaccines. Contact the Centers for Disease Control and Prevention (CDC): Call 2-278.798.4373 (1-800-CDC-INFO) or  Visit CDCs influenza website at www.cdc.gov/flu. Vaccine Information Statement   Inactivated Influenza Vaccine   8/6/2021  42 JARED Kilpatrick [id number]-54   Department of Health and Human Services  Centers for Disease Control and Prevention    Office Use Only

## 2023-01-16 ENCOUNTER — TELEPHONE (OUTPATIENT)
Dept: CARDIOLOGY CLINIC | Age: 78
End: 2023-01-16

## 2023-01-16 NOTE — TELEPHONE ENCOUNTER
Patient called [facility] sts she is unable to come in for the carelink on 1/18/2023. C/B and informed Patient that this is done by home monitor. Patient verbalized understanding.

## 2023-04-25 NOTE — PROGRESS NOTES
Medicare Annual Wellness Visit    Craig Grimaldo is here for Medicare AWV    Assessment & Plan   Welcome to Medicare preventive visit  Patient is overall doing well, she is continuing to make healthy lifestyle changes, she does have a Fitbit which we did review she can track her activity, aim for 60 minutes a day, track healthy sleep, and steps, she is trying to increase her exercise as well as increase the nonstarchy vegetable intake. She notes she does like the vegetables, having very good bowel movements, did have a colonoscopy which showed 6 polyps, she does have follow-up in 3 years, we will get these records, she also has some concern about her hearing we will send her to audiology, advanced care planning paperwork provided to her, no other concerns  Bilateral hearing loss, unspecified hearing loss type  -     [id number] [facility]  History of colon polyps      Recommendations for Preventive Services Due: see orders and patient instructions/AVS.  Recommended screening schedule for the next 5-10 years is provided to the patient in written form: see Patient Instructions/AVS.     Return in 1 year (on 4/25/2024) for Medicare Annual Wellness Visit in 1 year. Subjective   The following acute and/or chronic problems were also addressed today:  See above     HPI:     She notes she is doing well. She has been eating 5 servings of veggies. She did have colonoscopy with 6 polys removed and told to eat more fiber. She does a lot of gardening in th summer and is active. She notes she gets more activity in the summer. Patient's complete Health Risk Assessment and screening values have been reviewed and are found in Flowsheets. The following problems were reviewed today and where indicated follow up appointments were made and/or referrals ordered.     Positive Risk Factor Screenings with Interventions:                 Weight and Activity:  Physical Activity: Insufficiently Active    Days of Exercise per The pt. C/o chest congestion with a prod. Cough in the mornings of \"gel like green sputum with brown flecks\". No elevated temp in the past month. She states she was running a temp. Of 100 degrees off and on prior to CoVID. Jordy Herron presents today for   Chief Complaint   Patient presents with    Breathing Problem     F/U to 9/12/18     Cough       Is someone accompanying this pt? No    Is the patient using any DME equipment during [address]?    -[employer]     Depression Screening:  3 most recent PHQ Screens 2/13/2020   PHQ Not Done Active Diagnosis of Depression or Bipolar Disorder   Little interest or pleasure in doing things Not at all   Feeling down, depressed, irritable, or hopeless Not at all   Total Score PHQ 2 0       Learning Assessment:  Learning Assessment 10/7/2015   PRIMARY LEARNER Patient   PRIMARY LANGUAGE ENGLISH   LEARNER PREFERENCE PRIMARY LISTENING   ANSWERED BY patient   RELATIONSHIP SELF       Abuse Screening:  The pt; denies    Fall Risk  Fall Risk Assessment, last 12 mths 9/15/2020   Able to walk? Yes   Fall in past 12 months? Yes   Fall with injury? Yes   Number of falls in past 12 months 3   Fall Risk Score 4         Coordination of Care:  1. Have you been to the ER, urgent care clinic since your last visit? Hospitalized since your last visit? No    2. Have you seen or consulted any other health care providers outside of the [address] [place] [address] since your last visit? Dr Eulalia Capps    Medication variance in dosage/sig per patient as follows: Per Med.  Rec

## 2023-04-27 ENCOUNTER — NURSE ONLY (OUTPATIENT)
Age: 78
End: 2023-04-27

## 2023-04-27 ENCOUNTER — OFFICE VISIT (OUTPATIENT)
Age: 78
End: 2023-04-27
Payer: MEDICARE

## 2023-04-27 VITALS
SYSTOLIC BLOOD PRESSURE: 138 MMHG | DIASTOLIC BLOOD PRESSURE: 80 MMHG | OXYGEN SATURATION: 96 % | BODY MASS INDEX: 27.97 KG/M2 | HEART RATE: 63 BPM | HEIGHT: 66 IN | WEIGHT: 174 LBS

## 2023-04-27 DIAGNOSIS — I42.8 OTHER CARDIOMYOPATHY (HCC): Primary | ICD-10-CM

## 2023-04-27 DIAGNOSIS — I48.0 PAROXYSMAL ATRIAL FIBRILLATION (HCC): ICD-10-CM

## 2023-04-27 DIAGNOSIS — I10 ESSENTIAL (PRIMARY) HYPERTENSION: ICD-10-CM

## 2023-04-27 DIAGNOSIS — I47.20 VENTRICULAR TACHYARRHYTHMIA (HCC): Primary | ICD-10-CM

## 2023-04-27 DIAGNOSIS — Z95.810 PRESENCE OF AUTOMATIC (IMPLANTABLE) CARDIAC DEFIBRILLATOR: ICD-10-CM

## 2023-04-27 PROBLEM — E66.01 SEVERE OBESITY WITH BODY MASS INDEX (BMI) OF 35.0 TO 39.9 WITH SERIOUS COMORBIDITY (HCC): Status: ACTIVE | Noted: 2018-08-28

## 2023-04-27 PROBLEM — I26.99 ACUTE PULMONARY EMBOLUS (HCC): Status: ACTIVE | Noted: 2023-04-27

## 2023-04-27 PROBLEM — R30.0 DYSURIA: Status: ACTIVE | Noted: 2023-04-27

## 2023-04-27 PROCEDURE — 99215 OFFICE O/P EST HI 40 MIN: CPT | Performed by: INTERNAL MEDICINE

## 2023-04-27 PROCEDURE — G8427 DOCREV CUR MEDS BY ELIG CLIN: HCPCS | Performed by: INTERNAL MEDICINE

## 2023-04-27 PROCEDURE — 3079F DIAST BP 80-89 MM HG: CPT | Performed by: INTERNAL MEDICINE

## 2023-04-27 PROCEDURE — G8419 CALC BMI OUT NRM PARAM NOF/U: HCPCS | Performed by: INTERNAL MEDICINE

## 2023-04-27 PROCEDURE — G8399 PT W/DXA RESULTS DOCUMENT: HCPCS | Performed by: INTERNAL MEDICINE

## 2023-04-27 PROCEDURE — 1090F PRES/ABSN URINE INCON ASSESS: CPT | Performed by: INTERNAL MEDICINE

## 2023-04-27 PROCEDURE — 3075F SYST BP GE 130 - 139MM HG: CPT | Performed by: INTERNAL MEDICINE

## 2023-04-27 PROCEDURE — 1036F TOBACCO NON-USER: CPT | Performed by: INTERNAL MEDICINE

## 2023-04-27 PROCEDURE — 1123F ACP DISCUSS/DSCN MKR DOCD: CPT | Performed by: INTERNAL MEDICINE

## 2023-04-27 ASSESSMENT — PATIENT HEALTH QUESTIONNAIRE - PHQ9
1. LITTLE INTEREST OR PLEASURE IN DOING THINGS: 0
SUM OF ALL RESPONSES TO PHQ QUESTIONS 1-9: 0
2. FEELING DOWN, DEPRESSED OR HOPELESS: 0
SUM OF ALL RESPONSES TO PHQ QUESTIONS 1-9: 0
SUM OF ALL RESPONSES TO PHQ9 QUESTIONS 1 & 2: 0

## 2023-04-27 ASSESSMENT — ANXIETY QUESTIONNAIRES
7. FEELING AFRAID AS IF SOMETHING AWFUL MIGHT HAPPEN: 0
6. BECOMING EASILY ANNOYED OR IRRITABLE: 0
2. NOT BEING ABLE TO STOP OR CONTROL WORRYING: 0
5. BEING SO RESTLESS THAT IT IS HARD TO SIT STILL: 0
4. TROUBLE RELAXING: 0
GAD7 TOTAL SCORE: 0
1. FEELING NERVOUS, ANXIOUS, OR ON EDGE: 0
3. WORRYING TOO MUCH ABOUT DIFFERENT THINGS: 0

## 2023-04-27 NOTE — PROGRESS NOTES
Roberto Frank presents today for   Chief Complaint   Patient presents with    Follow-up     6 month    Device Check    Edema     Both legs, feet, and ankles       Roberto Frank preferred language for health care discussion is english/other. Is someone accompanying this pt? no    Is the patient using any DME equipment during OV? no    Depression Screening:  Depression: Not at risk    PHQ-2 Score: 0        Learning Assessment:  Who is the primary learner? Patient    What is the preferred language for health care of the primary learner? ENGLISH    How does the primary learner prefer to learn new concepts? DEMONSTRATION    Answered By patient    Relationship to Learner SELF           Pt currently taking Anticoagulant therapy? Eliquis 5 mg bid    Pt currently taking Antiplatelet therapy ? no      Coordination of Care:  1. Have you been to the ER, urgent care clinic since your last visit? Hospitalized since your last visit? no    2. Have you seen or consulted any other health care providers outside of the [address] [place] [address] since your last visit? Include any pap smears or colon screening.  no
Smokeless tobacco: Never   Vaping Use    Vaping Use: Never used   Substance and Sexual Activity    Alcohol use: No    Drug use: No    Sexual activity: Not Currently   Other Topics Concern    Not on file   Social History Narrative    Not on file     Social Determinants of Health     Financial Resource Strain: Not on file   Food Insecurity: Not on file   Transportation Needs: Not on file   Physical Activity: Not on file   Stress: Not on file   Social Connections: Not on file   Intimate Partner Violence: Not on file   Housing Stability: Not on file    retired [address],  with dementia, long time [place] pts I have also seen her daughter (who has a different father than her current )    Review of Systems    14 pt Review of Systems is negative unless otherwise mentioned in the HPI. Wt Readings from Last 3 Encounters:   10/19/22 83 kg (183 lb)   06/07/22 87.2 kg (192 lb 3.2 oz)   05/11/22 88.9 kg (196 lb)     Temp Readings from Last 3 Encounters:   06/07/22 97.1 °F (36.2 °C) (Oral)   05/11/22 97.2 °F (36.2 °C)   05/09/22 98.7 °F (37.1 °C)     BP Readings from Last 3 Encounters:   10/19/22 114/66   06/07/22 120/72   05/11/22 92/60     Pulse Readings from Last 3 Encounters:   10/19/22 60   06/07/22 60   05/11/22 60       07/29/18   ECHO ADULT COMPLETE 07/30/2018 7/30/2018    Narrative · Technically difficult study with poor endocardial visualization. Definity contrast was given to enhance imaging. · Estimated left ventricular ejection fraction is 56 - 60%. Left   ventricular mild concentric hypertrophy observed. Normal left ventricular   wall motion, no regional wall motion abnormality noted. Age-appropriate   left ventricular diastolic function. · Mild tricuspid valve regurgitation is present. Pulmonary arterial   systolic pressure is 40 mmHg. Mild pulmonary hypertension is present. · Pacer/ICD present in the right ventricle. · Mitral annular calcification.         Signed by: Beth Mcnair MD

## 2023-05-04 PROBLEM — E83.42 HYPOMAGNESEMIA: Status: ACTIVE | Noted: 2023-05-04

## 2023-05-04 RX ORDER — SODIUM CHLORIDE 9 MG/ML
5-250 INJECTION, SOLUTION INTRAVENOUS PRN
OUTPATIENT
Start: 2023-05-10

## 2023-05-04 RX ORDER — EPINEPHRINE 1 MG/ML
0.3 INJECTION, SOLUTION, CONCENTRATE INTRAVENOUS PRN
OUTPATIENT
Start: 2023-05-10

## 2023-05-04 RX ORDER — ZOLEDRONIC ACID 5 MG/100ML
5 INJECTION, SOLUTION INTRAVENOUS ONCE
OUTPATIENT
Start: 2023-05-10 | End: 2023-05-10

## 2023-05-04 RX ORDER — ALBUTEROL SULFATE 90 UG/1
4 AEROSOL, METERED RESPIRATORY (INHALATION) PRN
OUTPATIENT
Start: 2023-05-10

## 2023-05-04 RX ORDER — METHYLPREDNISOLONE SODIUM SUCCINATE 40 MG/ML
40 INJECTION, POWDER, LYOPHILIZED, FOR SOLUTION INTRAMUSCULAR; INTRAVENOUS
Start: 2023-05-10

## 2023-05-04 RX ORDER — ONDANSETRON 2 MG/ML
8 INJECTION INTRAMUSCULAR; INTRAVENOUS
OUTPATIENT
Start: 2023-05-10

## 2023-05-04 RX ORDER — DIPHENHYDRAMINE HYDROCHLORIDE 50 MG/ML
50 INJECTION INTRAMUSCULAR; INTRAVENOUS
OUTPATIENT
Start: 2023-05-10

## 2023-05-04 RX ORDER — SODIUM CHLORIDE 9 MG/ML
INJECTION, SOLUTION INTRAVENOUS CONTINUOUS
OUTPATIENT
Start: 2023-05-10

## 2023-05-04 RX ORDER — ACETAMINOPHEN 325 MG/1
650 TABLET ORAL
OUTPATIENT
Start: 2023-05-10

## 2023-05-04 RX ORDER — HEPARIN SODIUM (PORCINE) LOCK FLUSH IV SOLN 100 UNIT/ML 100 UNIT/ML
500 SOLUTION INTRAVENOUS PRN
OUTPATIENT
Start: 2023-05-10

## 2023-05-04 RX ORDER — SODIUM CHLORIDE 0.9 % (FLUSH) 0.9 %
5-40 SYRINGE (ML) INJECTION PRN
OUTPATIENT
Start: 2023-05-10

## 2023-05-09 ENCOUNTER — APPOINTMENT (OUTPATIENT)
Dept: INFUSION THERAPY | Age: 78
End: 2023-05-09

## 2023-05-10 ENCOUNTER — HOSPITAL ENCOUNTER (OUTPATIENT)
Facility: HOSPITAL | Age: 78
Setting detail: INFUSION SERIES
End: 2023-05-10

## 2023-05-10 ENCOUNTER — APPOINTMENT (OUTPATIENT)
Dept: INFUSION THERAPY | Age: 78
End: 2023-05-10

## 2023-05-10 DIAGNOSIS — M81.0 POSTMENOPAUSAL BONE LOSS: ICD-10-CM

## 2023-05-10 DIAGNOSIS — E83.42 HYPOMAGNESEMIA: Primary | ICD-10-CM

## 2023-05-26 RX ORDER — PRAVASTATIN SODIUM 80 MG/1
80 TABLET ORAL NIGHTLY
Qty: 90 TABLET | Refills: 3 | Status: SHIPPED | OUTPATIENT
Start: 2023-05-26

## 2023-05-26 RX ORDER — PRAVASTATIN SODIUM 80 MG/1
80 TABLET ORAL NIGHTLY
Qty: 90 TABLET | Refills: 3 | Status: CANCELLED | OUTPATIENT
Start: 2023-05-26

## 2023-08-14 ENCOUNTER — TELEPHONE (OUTPATIENT)
Age: 78
End: 2023-08-14

## 2023-08-14 NOTE — TELEPHONE ENCOUNTER
Patient called the office stating she was having chest pain earlier in the day, but is better now. Her main concern is the battery on her BIV AICD. Explained the her that she has an appointment with the NP, Steve White on Wednesday. She will get her device check then as well and if it is time to schedule a Gen Change it will be done then. This has been fully explained to the patient, who indicates understanding.

## 2023-08-16 ENCOUNTER — NURSE ONLY (OUTPATIENT)
Age: 78
End: 2023-08-16

## 2023-08-16 ENCOUNTER — OFFICE VISIT (OUTPATIENT)
Age: 78
End: 2023-08-16

## 2023-08-16 ENCOUNTER — HOSPITAL ENCOUNTER (OUTPATIENT)
Facility: HOSPITAL | Age: 78
Discharge: HOME | End: 2023-08-19
Payer: MEDICARE

## 2023-08-16 VITALS
DIASTOLIC BLOOD PRESSURE: 80 MMHG | HEART RATE: 60 BPM | SYSTOLIC BLOOD PRESSURE: 118 MMHG | WEIGHT: 170.8 LBS | BODY MASS INDEX: 26.81 KG/M2 | OXYGEN SATURATION: 96 % | HEIGHT: 67 IN

## 2023-08-16 DIAGNOSIS — I42.9 CARDIOMYOPATHY, IDIOPATHIC (HCC): ICD-10-CM

## 2023-08-16 DIAGNOSIS — Z95.810 PRESENCE OF AUTOMATIC (IMPLANTABLE) CARDIAC DEFIBRILLATOR: ICD-10-CM

## 2023-08-16 DIAGNOSIS — I10 ESSENTIAL (PRIMARY) HYPERTENSION: ICD-10-CM

## 2023-08-16 DIAGNOSIS — I10 PRIMARY HYPERTENSION: Primary | ICD-10-CM

## 2023-08-16 DIAGNOSIS — I10 PRIMARY HYPERTENSION: ICD-10-CM

## 2023-08-16 DIAGNOSIS — I48.0 PAF (PAROXYSMAL ATRIAL FIBRILLATION) (HCC): ICD-10-CM

## 2023-08-16 DIAGNOSIS — I42.9 CARDIOMYOPATHY, IDIOPATHIC (HCC): Primary | ICD-10-CM

## 2023-08-16 LAB
ALBUMIN SERPL-MCNC: 3.8 G/DL (ref 3.4–5)
ALBUMIN/GLOB SERPL: 1.4 (ref 0.8–1.7)
ALP SERPL-CCNC: 120 U/L (ref 45–117)
ALT SERPL-CCNC: 13 U/L (ref 13–56)
ANION GAP SERPL CALC-SCNC: 3 MMOL/L (ref 3–18)
AST SERPL-CCNC: 16 U/L (ref 10–38)
BILIRUB SERPL-MCNC: 0.6 MG/DL (ref 0.2–1)
BUN SERPL-MCNC: 17 MG/DL (ref 7–18)
BUN/CREAT SERPL: 19 (ref 12–20)
CALCIUM SERPL-MCNC: 9.1 MG/DL (ref 8.5–10.1)
CHLORIDE SERPL-SCNC: 110 MMOL/L (ref 100–111)
CO2 SERPL-SCNC: 25 MMOL/L (ref 21–32)
CREAT SERPL-MCNC: 0.88 MG/DL (ref 0.6–1.3)
ERYTHROCYTE [DISTWIDTH] IN BLOOD BY AUTOMATED COUNT: 13.7 % (ref 11.6–14.5)
GLOBULIN SER CALC-MCNC: 2.8 G/DL (ref 2–4)
GLUCOSE SERPL-MCNC: 76 MG/DL (ref 74–99)
HCT VFR BLD AUTO: 41.6 % (ref 35–45)
HGB BLD-MCNC: 13.4 G/DL (ref 12–16)
INR PPP: 1.2 (ref 0.9–1.1)
MCH RBC QN AUTO: 30.2 PG (ref 24–34)
MCHC RBC AUTO-ENTMCNC: 32.2 G/DL (ref 31–37)
MCV RBC AUTO: 93.9 FL (ref 78–100)
NRBC # BLD: 0 K/UL (ref 0–0.01)
NRBC BLD-RTO: 0 PER 100 WBC
PLATELET # BLD AUTO: 271 K/UL (ref 135–420)
PMV BLD AUTO: 10.2 FL (ref 9.2–11.8)
POTASSIUM SERPL-SCNC: 4.5 MMOL/L (ref 3.5–5.5)
PROT SERPL-MCNC: 6.6 G/DL (ref 6.4–8.2)
PROTHROMBIN TIME: 15.3 SEC (ref 11.9–14.7)
RBC # BLD AUTO: 4.43 M/UL (ref 4.2–5.3)
SODIUM SERPL-SCNC: 138 MMOL/L (ref 136–145)
WBC # BLD AUTO: 7.9 K/UL (ref 4.6–13.2)

## 2023-08-16 PROCEDURE — 80053 COMPREHEN METABOLIC PANEL: CPT

## 2023-08-16 PROCEDURE — 85610 PROTHROMBIN TIME: CPT

## 2023-08-16 PROCEDURE — 36415 COLL VENOUS BLD VENIPUNCTURE: CPT

## 2023-08-16 PROCEDURE — 85027 COMPLETE CBC AUTOMATED: CPT

## 2023-08-16 RX ORDER — SODIUM CHLORIDE 0.9 % (FLUSH) 0.9 %
5-40 SYRINGE (ML) INJECTION PRN
OUTPATIENT
Start: 2023-08-16

## 2023-08-16 RX ORDER — SODIUM CHLORIDE 9 MG/ML
INJECTION, SOLUTION INTRAVENOUS PRN
OUTPATIENT
Start: 2023-08-16

## 2023-08-16 RX ORDER — SODIUM CHLORIDE 0.9 % (FLUSH) 0.9 %
5-40 SYRINGE (ML) INJECTION EVERY 12 HOURS SCHEDULED
OUTPATIENT
Start: 2023-08-16

## 2023-08-16 ASSESSMENT — ENCOUNTER SYMPTOMS
DIARRHEA: 0
CONSTIPATION: 0
COUGH: 0
ABDOMINAL DISTENTION: 0
VOMITING: 0
NAUSEA: 0
CHEST TIGHTNESS: 0
WHEEZING: 0
BLOOD IN STOOL: 0
SHORTNESS OF BREATH: 0

## 2023-08-16 NOTE — PATIENT INSTRUCTIONS
[facility]   Patient  EP Instructions  [address] [facility], [address] [facility]                You are scheduled to have a AICD Gen Change on  August 30, 2023 , at [address] am.     Please check in at [address] am.     Please go to DR[facility] TEX THOMAS and park in the outpatient parking lot that is located around to the back of the hospital and enter through the [facility]. Once you enter through the [facility] check in with the  there. The  will either give you directions or assist you in getting to the cath holding area. 3.  You are not to eat or drink anything after midnight the night before your procedure. Please continue to take your medications with a small sip of water on the morning of the procedure with the following exceptions: Hold Eliquis 48 hours prior to procedure      If you are diabetic, do not take your insulin/sugar pill the morning of the procedure. We encourage families to wait in the waiting room on the first floor while the procedure is being done. The Doctor will come out and talk with you as soon as the procedure is over. You will not be able to drive yourself home after your procedure is done. There is the possibility that you may spend the night in the hospital, depending on the results of the procedure. This will be determined after the procedure is done. 8.   If you or your family have any questions, please call our office Monday-Friday 8:30 am - 4:30 pm , at [phone], and ask to speak to one of the nurses. Post Implant Instructions for Pacemakers,AICD, and BIV devices. You may remove the big bulky bandage from the incision site after 24-48 hours. Keep the incision clean and dry for another 2 days. After that, it is ok to get the incision wet but do not soak or scrub the incision. Pat the area dry with a clean towel.   The small steri-strips will come off in 10-14 days - if after 14 days, they

## 2023-08-28 ENCOUNTER — CLINICAL DOCUMENTATION (OUTPATIENT)
Age: 78
End: 2023-08-28
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
## 2023-08-30 ENCOUNTER — HOSPITAL ENCOUNTER (OUTPATIENT)
Facility: HOSPITAL | Age: 78
Setting detail: OUTPATIENT SURGERY
Discharge: HOME | End: 2023-08-30
Attending: INTERNAL MEDICINE | Admitting: INTERNAL MEDICINE
Payer: MEDICARE

## 2023-08-30 VITALS
DIASTOLIC BLOOD PRESSURE: 85 MMHG | SYSTOLIC BLOOD PRESSURE: 149 MMHG | RESPIRATION RATE: 15 BRPM | HEART RATE: 65 BPM | OXYGEN SATURATION: 98 %

## 2023-08-30 DIAGNOSIS — Z45.02 AICD AT END OF BATTERY LIFE: ICD-10-CM

## 2023-08-30 PROCEDURE — C1882 AICD, OTHER THAN SING/DUAL: HCPCS | Performed by: INTERNAL MEDICINE

## 2023-08-30 PROCEDURE — 2709999900 HC NON-CHARGEABLE SUPPLY: Performed by: INTERNAL MEDICINE

## 2023-08-30 PROCEDURE — 2720000010 HC SURG SUPPLY STERILE: Performed by: INTERNAL MEDICINE

## 2023-08-30 PROCEDURE — 6360000002 HC RX W HCPCS: Performed by: INTERNAL MEDICINE

## 2023-08-30 PROCEDURE — 99153 MOD SED SAME PHYS/QHP EA: CPT | Performed by: INTERNAL MEDICINE

## 2023-08-30 PROCEDURE — 99152 MOD SED SAME PHYS/QHP 5/>YRS: CPT | Performed by: INTERNAL MEDICINE

## 2023-08-30 PROCEDURE — 2500000003 HC RX 250 WO HCPCS: Performed by: INTERNAL MEDICINE

## 2023-08-30 PROCEDURE — 33264 RMVL & RPLCMT DFB GEN MLT LD: CPT | Performed by: INTERNAL MEDICINE

## 2023-08-30 DEVICE — CRTD DTMA1D1 CLARIA MRI US DF-1
Type: IMPLANTABLE DEVICE | Status: FUNCTIONAL
Brand: CLARIA MRI™ CRT-D SURESCAN™

## 2023-08-30 RX ORDER — CEFAZOLIN SODIUM 1 G/3ML
INJECTION, POWDER, FOR SOLUTION INTRAMUSCULAR; INTRAVENOUS PRN
Status: DISCONTINUED | OUTPATIENT
Start: 2023-08-30 | End: 2023-08-30 | Stop reason: HOSPADM

## 2023-08-30 RX ORDER — FENTANYL CITRATE 50 UG/ML
INJECTION, SOLUTION INTRAMUSCULAR; INTRAVENOUS PRN
Status: DISCONTINUED | OUTPATIENT
Start: 2023-08-30 | End: 2023-08-30 | Stop reason: HOSPADM

## 2023-08-30 RX ORDER — MIDAZOLAM HYDROCHLORIDE 1 MG/ML
INJECTION INTRAMUSCULAR; INTRAVENOUS PRN
Status: DISCONTINUED | OUTPATIENT
Start: 2023-08-30 | End: 2023-08-30 | Stop reason: HOSPADM

## 2023-08-30 NOTE — H&P
Felipe Escobar MD at [address]   2012     replacement - [employer]    PACEMAKER   May 2008     Biventricular ICD implantation     PACEMAKER PLACEMENT        PELVIC LAPAROSCOPY         diagnostic - varicosities            MEDS:  Current Facility-Administered Medications          Current Outpatient Medications   Medication Sig Dispense Refill    pravastatin (PRAVACHOL) 80 MG tablet Take 1 tablet by mouth nightly 90 tablet 3    albuterol sulfate (PROAIR RESPICLICK) 717 (90 Base) MCG/ACT aerosol powder inhalation Inhale 2 puffs into the lungs every 4 hours as needed        apixaban (ELIQUIS) 5 MG TABS tablet Take 1 tablet by mouth 2 times daily        dilTIAZem (TIAZAC) 240 MG extended release capsule Take 1 capsule by mouth daily        escitalopram (LEXAPRO) 20 MG tablet Take 1 tablet by mouth daily        metoprolol (LOPRESSOR) 100 MG tablet Take 1 tablet by mouth 2 times daily          No current facility-administered medications for this visit. Allergies and Sensitivities:        Allergies   Allergen Reactions    Clindamycin Hcl Swelling    Lisinopril Rash       Hypertensive crisis    Tetracycline Anaphylaxis, Rash and Swelling    Chlorhexidine Gluconate Other (See Comments)       Turned red and BP drop low. Family History:  Family History         Family History   Problem Relation Age of Onset    Cancer Mother           Cancer of the breast    Stroke Mother      Cancer Father      Heart Disease Father           CHF    COPD Brother      Heart Attack Brother           Multiple    Heart Disease Brother           CHF            Social History:  She  reports that she quit smoking about 35 years ago. Her smoking use included cigarettes. She started smoking about 60 years ago. She smoked an average of 1.5 packs per day. She has never used smokeless tobacco.  She  reports no history of alcohol use.      Review of Systems   Constitutional:  Negative for activity change, appetite change, chills,

## 2023-08-30 NOTE — DISCHARGE INSTRUCTIONS
Resume Eliquis on Friday, September 1       Biventricular Pacemaker Placement: What to Expect at [address], [address] pacemaker placement is surgery to put a biventricular pacemaker in your chest. Your doctor made a cut (incision) just below your collarbone. The doctor put the pacemaker leads through the cut, into a large blood vessel, then into the heart. The doctor put the pacemaker under the skin of your chest and attached the leads to it. Your chest may be sore where the doctor made the cut. You also may have a bruise and mild swelling. These symptoms usually get better in 1 to 2 weeks. You may feel a hard ridge along the incision. This usually gets softer in the months after surgery. You may be able to see or feel the outline of the pacemaker under your skin. You may be able to go back to work or your usual routine 1 to 2 weeks after surgery. But for at least a few weeks after the surgery, you will avoid vigorous physical activity that involves your upper body. Pacemaker batteries usually last about 10 years. Your doctor will talk to you about how often you will need to have your pacemaker checked. You'll need to take steps to safely use electric devices. Some of these devices can stop your pacemaker from working right for a short time. Check with your doctor about what to avoid and what to keep a short distance away from your pacemaker. For example, you will need to stay away from things with strong magnetic and electrical fields. An example is an MRI machine (unless your pacemaker is safe for an MRI). You can use a cell phone and other wireless devices, but keep them at least 6 inches away from your pacemaker. Many household and office electronics don't affect a pacemaker. This care sheet gives you a general idea about how long it will take for you to recover. But each person recovers at a different pace. Follow the steps below to get better as quickly as possible.   How can you care for

## 2023-08-30 NOTE — PRE SEDATION
Sedation Plan  ASA: class 3 - patient with severe systemic disease     Mallampati class: II - soft palate, uvula, fauces visible. Sedation plan: moderate (conscious sedation)    Risks, benefits, and alternatives discussed with patient. Use of blood products discussed with patient who consented to blood products. Immediate reassessment prior to sedation:  Patient's status reviewed and vital signs assessed; acceptable to perform procedure and proceed to administer sedation as planned.

## 2023-08-30 NOTE — PROGRESS NOTES
TRANSFER - IN REPORT:    Verbal report received from [address] on [facility]  being received from EP lab  for routine progression of patient care      Report consisted of patient's Situation, Background, Assessment and   Recommendations(SBAR). Information from the following report(s) Nurse Handoff Report, Intake/Output, Cardiac Rhythm NSR, Neuro Assessment, and Event Log was reviewed with the receiving nurse. Opportunity for questions and clarification was provided. Assessment completed upon patient's arrival to unit and care assumed.

## 2023-09-07 ENCOUNTER — NURSE ONLY (OUTPATIENT)
Age: 78
End: 2023-09-07

## 2023-09-07 DIAGNOSIS — Z45.02 AICD AT END OF BATTERY LIFE: Primary | ICD-10-CM

## 2023-09-07 DIAGNOSIS — I42.9 CARDIOMYOPATHY, IDIOPATHIC (HCC): ICD-10-CM

## 2023-09-07 DIAGNOSIS — Z95.810 CARDIAC DEFIBRILLATOR IN PLACE: ICD-10-CM

## 2023-09-07 DIAGNOSIS — I48.0 PAF (PAROXYSMAL ATRIAL FIBRILLATION) (HCC): ICD-10-CM

## 2023-09-12 NOTE — RESULT ENCOUNTER NOTE
Device check personally reviewed by me. Device at RRT. Generator change was scheduled. .  See scanned interrogation document for complete details.

## 2023-09-12 NOTE — RESULT ENCOUNTER NOTE
Device check personally reviewed by me. Normal device function on interrogation. Configuration is chronic. See scanned interrogation document for complete details.

## 2023-10-27 RX ORDER — APIXABAN 5 MG/1
5 TABLET, FILM COATED ORAL 2 TIMES DAILY
Qty: 180 TABLET | Refills: 3 | Status: SHIPPED | OUTPATIENT
Start: 2023-10-27

## 2023-11-21 ENCOUNTER — TELEPHONE (OUTPATIENT)
Age: 78
End: 2023-11-21

## 2023-11-21 NOTE — TELEPHONE ENCOUNTER
Pt says she has a respiratory infection and would like a prescription called into [facility] in [facility] (P: [phone]).  Please advise [phone]

## 2023-11-21 NOTE — TELEPHONE ENCOUNTER
Patient having congestion and cough with greenish sputum x 2 weeks. 100 degree temp.    [address]

## 2023-11-22 RX ORDER — AMOXICILLIN AND CLAVULANATE POTASSIUM 875; 125 MG/1; MG/1
1 TABLET, FILM COATED ORAL 2 TIMES DAILY
Qty: 14 TABLET | Refills: 0 | Status: SHIPPED | OUTPATIENT
Start: 2023-11-22 | End: 2023-11-29

## 2024-01-15 RX ORDER — DILTIAZEM HYDROCHLORIDE 240 MG/1
240 CAPSULE, COATED, EXTENDED RELEASE ORAL DAILY
Qty: 90 CAPSULE | Refills: 3 | Status: SHIPPED | OUTPATIENT
Start: 2024-01-15

## 2024-01-15 RX ORDER — DILTIAZEM HYDROCHLORIDE 240 MG/1
240 CAPSULE, COATED, EXTENDED RELEASE ORAL DAILY
Qty: 90 CAPSULE | Refills: 3 | Status: SHIPPED | OUTPATIENT
Start: 2024-01-15 | End: 2024-01-15 | Stop reason: SDUPTHER

## 2024-01-18 RX ORDER — APIXABAN 5 MG/1
5 TABLET, FILM COATED ORAL 2 TIMES DAILY
Qty: 180 TABLET | Refills: 3 | Status: SHIPPED | OUTPATIENT
Start: 2024-01-18

## 2024-01-18 RX ORDER — PRAVASTATIN SODIUM 80 MG/1
80 TABLET ORAL NIGHTLY
Qty: 90 TABLET | Refills: 3 | Status: SHIPPED | OUTPATIENT
Start: 2024-01-18

## 2024-01-18 RX ORDER — DILTIAZEM HYDROCHLORIDE 240 MG/1
240 CAPSULE, COATED, EXTENDED RELEASE ORAL DAILY
Qty: 90 CAPSULE | Refills: 3 | Status: CANCELLED | OUTPATIENT
Start: 2024-01-18

## 2024-01-18 RX ORDER — METOPROLOL TARTRATE 100 MG/1
100 TABLET ORAL 2 TIMES DAILY
Qty: 180 TABLET | Refills: 3 | Status: SHIPPED | OUTPATIENT
Start: 2024-01-18

## 2024-02-05 ENCOUNTER — OFFICE VISIT (OUTPATIENT)
Age: 79
End: 2024-02-05
Payer: MEDICARE

## 2024-02-05 VITALS
BODY MASS INDEX: 25.21 KG/M2 | TEMPERATURE: 98.1 F | RESPIRATION RATE: 16 BRPM | OXYGEN SATURATION: 97 % | HEART RATE: 83 BPM | HEIGHT: 67 IN | DIASTOLIC BLOOD PRESSURE: 78 MMHG | SYSTOLIC BLOOD PRESSURE: 124 MMHG | WEIGHT: 160.6 LBS

## 2024-02-05 DIAGNOSIS — I42.9 CARDIOMYOPATHY, IDIOPATHIC (HCC): ICD-10-CM

## 2024-02-05 DIAGNOSIS — I26.93 SINGLE SUBSEGMENTAL PULMONARY EMBOLISM WITHOUT ACUTE COR PULMONALE (HCC): ICD-10-CM

## 2024-02-05 DIAGNOSIS — J40 BRONCHITIS: ICD-10-CM

## 2024-02-05 DIAGNOSIS — J47.0 BRONCHIECTASIS WITH ACUTE LOWER RESPIRATORY INFECTION (HCC): Primary | ICD-10-CM

## 2024-02-05 PROCEDURE — G8399 PT W/DXA RESULTS DOCUMENT: HCPCS | Performed by: INTERNAL MEDICINE

## 2024-02-05 PROCEDURE — G8419 CALC BMI OUT NRM PARAM NOF/U: HCPCS | Performed by: INTERNAL MEDICINE

## 2024-02-05 PROCEDURE — G8484 FLU IMMUNIZE NO ADMIN: HCPCS | Performed by: INTERNAL MEDICINE

## 2024-02-05 PROCEDURE — G8427 DOCREV CUR MEDS BY ELIG CLIN: HCPCS | Performed by: INTERNAL MEDICINE

## 2024-02-05 PROCEDURE — 3078F DIAST BP <80 MM HG: CPT | Performed by: INTERNAL MEDICINE

## 2024-02-05 PROCEDURE — 1036F TOBACCO NON-USER: CPT | Performed by: INTERNAL MEDICINE

## 2024-02-05 PROCEDURE — 99214 OFFICE O/P EST MOD 30 MIN: CPT | Performed by: INTERNAL MEDICINE

## 2024-02-05 PROCEDURE — 1090F PRES/ABSN URINE INCON ASSESS: CPT | Performed by: INTERNAL MEDICINE

## 2024-02-05 PROCEDURE — 3074F SYST BP LT 130 MM HG: CPT | Performed by: INTERNAL MEDICINE

## 2024-02-05 PROCEDURE — 1123F ACP DISCUSS/DSCN MKR DOCD: CPT | Performed by: INTERNAL MEDICINE

## 2024-02-05 NOTE — PROGRESS NOTES
Mirta Mckeon presents today for   Chief Complaint   Patient presents with    Follow-up       Is someone accompanying this pt? No    Is the patient using any DME equipment during OV? No    -DME Company NA    Depression Screenin/27/2023    12:53 PM   PHQ-9 Questionaire   Little interest or pleasure in doing things 0   Feeling down, depressed, or hopeless 0   PHQ-9 Total Score 0       Learning Needs Questionnaire:     No question data found.      Fall Risk:         2023    12:53 PM   Fall Risk   2 or more falls in past year? no   Fall with injury in past year? no        Abuse Screening:          No data to display                  Coordination of Care:    1. Have you been to the ER, urgent care clinic since your last visit? Hospitalized since your last visit? No    2. Have you seen or consulted any other health care providers outside of the [facility] since your last visit? Include any pap smears or colon screening. No    Medication list has been update per patient.      
hernia.    Upper Abdomen:  No acute abnormalities.    Bones:  Developmental variant anatomy in the lower thoracic region with 2 tandem  foci of partial fusion or incomplete segmentation of the vertebrae, i.e. T10-11  and T12-L1.    Impression  IMPRESSION:    1.  Slight bilateral lung base subtle peripheral intralobular septal thickening/  reticulation, stable compared to prior studies, nonspecific.    2.  Stable 0.2 cm nodule in the right lower lobe.    3.  Stable left breast chronic seroma.    4.  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      CT chest (05/10/16):There are filling defects in the upper, middle, and lower lobe branches of the right pulmonary artery. No evidence of embolus in the left pulmonary artery branches or in the central trunks. The main pulmonary artery measures 3.3 cm in diameter. The thoracic aorta is not aneurysmal.  No evidence for dissection.      Lymph nodes: Mediastinal lymph nodes are mildly prominent. A right paratracheal lymph node measures 1.2 x 0.9 cm. Previously, this lymph node measured 1.8 x 0.4 cm. There is no evidence of hilar lymphadenopathy.      Thyroid: Normal in size without mass in the visualized parenchyma..      Mediastinum: There is a pacemaker in the left chest wall. A fluid collection in the inferior left breast measures about 3.3 x 4.8 cm (previously, 2.8 x 4.4 cm). There is no evidence of peripheral enhancement. 2 adjacent biopsy clips are redemonstrated. There is a small hiatal hernia.      Lungs:    The lungs are diffusely hyperinflated with mild tracheobronchomalacia. Right Lung: There is a small posterior effusion with overlying atelectasis. No confluent infiltrate to suggest infarct.      Left Lung:  No evidence of infiltrate. There is a small focus of atelectasis in the posterior lingula. This is similar to previous exam. No pleural effusion.      Abdomen: There is diffuse fatty atrophy of the pancreas. Visualized portions of

## 2024-02-23 ENCOUNTER — OFFICE VISIT (OUTPATIENT)
Age: 79
End: 2024-02-23
Payer: MEDICARE

## 2024-02-23 VITALS
OXYGEN SATURATION: 97 % | HEIGHT: 67 IN | SYSTOLIC BLOOD PRESSURE: 122 MMHG | DIASTOLIC BLOOD PRESSURE: 68 MMHG | HEART RATE: 60 BPM | WEIGHT: 158 LBS | BODY MASS INDEX: 24.8 KG/M2

## 2024-02-23 DIAGNOSIS — I48.0 PAF (PAROXYSMAL ATRIAL FIBRILLATION) (HCC): Primary | ICD-10-CM

## 2024-02-23 DIAGNOSIS — I10 PRIMARY HYPERTENSION: ICD-10-CM

## 2024-02-23 DIAGNOSIS — I42.8 OTHER CARDIOMYOPATHY (HCC): ICD-10-CM

## 2024-02-23 DIAGNOSIS — Z95.810 PRESENCE OF AUTOMATIC (IMPLANTABLE) CARDIAC DEFIBRILLATOR: ICD-10-CM

## 2024-02-23 DIAGNOSIS — I44.7 LEFT BUNDLE BRANCH BLOCK: ICD-10-CM

## 2024-02-23 PROCEDURE — 3078F DIAST BP <80 MM HG: CPT | Performed by: INTERNAL MEDICINE

## 2024-02-23 PROCEDURE — 99214 OFFICE O/P EST MOD 30 MIN: CPT | Performed by: INTERNAL MEDICINE

## 2024-02-23 PROCEDURE — 1123F ACP DISCUSS/DSCN MKR DOCD: CPT | Performed by: INTERNAL MEDICINE

## 2024-02-23 PROCEDURE — 1036F TOBACCO NON-USER: CPT | Performed by: INTERNAL MEDICINE

## 2024-02-23 PROCEDURE — G8427 DOCREV CUR MEDS BY ELIG CLIN: HCPCS | Performed by: INTERNAL MEDICINE

## 2024-02-23 PROCEDURE — G8399 PT W/DXA RESULTS DOCUMENT: HCPCS | Performed by: INTERNAL MEDICINE

## 2024-02-23 PROCEDURE — G8484 FLU IMMUNIZE NO ADMIN: HCPCS | Performed by: INTERNAL MEDICINE

## 2024-02-23 PROCEDURE — G8420 CALC BMI NORM PARAMETERS: HCPCS | Performed by: INTERNAL MEDICINE

## 2024-02-23 PROCEDURE — 3074F SYST BP LT 130 MM HG: CPT | Performed by: INTERNAL MEDICINE

## 2024-02-23 PROCEDURE — 1090F PRES/ABSN URINE INCON ASSESS: CPT | Performed by: INTERNAL MEDICINE

## 2024-02-23 ASSESSMENT — ENCOUNTER SYMPTOMS
ABDOMINAL PAIN: 0
SHORTNESS OF BREATH: 0
VOMITING: 0
ABDOMINAL DISTENTION: 0
NAUSEA: 0
COUGH: 0
SORE THROAT: 0

## 2024-02-23 ASSESSMENT — PATIENT HEALTH QUESTIONNAIRE - PHQ9
SUM OF ALL RESPONSES TO PHQ QUESTIONS 1-9: 0
2. FEELING DOWN, DEPRESSED OR HOPELESS: 0
SUM OF ALL RESPONSES TO PHQ QUESTIONS 1-9: 0
SUM OF ALL RESPONSES TO PHQ QUESTIONS 1-9: 0
1. LITTLE INTEREST OR PLEASURE IN DOING THINGS: 0
SUM OF ALL RESPONSES TO PHQ QUESTIONS 1-9: 0
SUM OF ALL RESPONSES TO PHQ9 QUESTIONS 1 & 2: 0

## 2024-02-23 ASSESSMENT — ANXIETY QUESTIONNAIRES
2. NOT BEING ABLE TO STOP OR CONTROL WORRYING: 1
GAD7 TOTAL SCORE: 8
3. WORRYING TOO MUCH ABOUT DIFFERENT THINGS: 1
6. BECOMING EASILY ANNOYED OR IRRITABLE: 2
7. FEELING AFRAID AS IF SOMETHING AWFUL MIGHT HAPPEN: 0
4. TROUBLE RELAXING: 2
1. FEELING NERVOUS, ANXIOUS, OR ON EDGE: 2
5. BEING SO RESTLESS THAT IT IS HARD TO SIT STILL: 0

## 2024-02-23 NOTE — PROGRESS NOTES
Mirta Mckeon presents today for   Chief Complaint   Patient presents with    Follow-up     overdue       Mirta Mckeon preferred language for health care discussion is english/other.    Is someone accompanying this pt? no    Is the patient using any DME equipment during OV? no    Depression Screening:  Depression: Not at risk (2/23/2024)    PHQ-2     PHQ-2 Score: 0        Learning Assessment:  Who is the primary learner? Patient    What is the preferred language for health care of the primary learner? ENGLISH    How does the primary learner prefer to learn new concepts? DEMONSTRATION    Answered By patient    Relationship to Learner SELF           Pt currently taking Anticoagulant therapy? Eliquis 5 mg twice a day    Pt currently taking Antiplatelet therapy ? no      Coordination of Care:  1. Have you been to the ER, urgent care clinic since your last visit? Hospitalized since your last visit? no    2. Have you seen or consulted any other health care providers outside of the [facility] since your last visit? Include any pap smears or colon screening. no

## 2024-02-23 NOTE — PROGRESS NOTES
02/23/24     Mirta Mckeon  is a 78 y.o. female     Chief Complaint   Patient presents with    Follow-up     overdue    Palpitations     At rest off and on       Palpitations   Pertinent negatives include no chest pain, coughing, dizziness, fever, nausea, shortness of breath, vomiting or weakness.       Patient presents for a new office visit.  She was previously seen in our office by some of my partners.  She was last seen by our nurse practitioner prior to undergoing an AICD generator exchange in October 2023.    She has a past medical history significant for a nonischemic cardiomyopathy with ejection fraction as low as 25% in the past.  She underwent a cardiac catheterization in 2008 which showed no significant coronary artery disease.  Due to a left bundle branch block, she underwent implantation of a biventricular AICD in 2008 and her overall ejection fraction improved to normal on repeat echocardiogram in 2009.  Her last echocardiogram was completed in 2018 which still showed preserved LVEF of 55 to 60%.    She has a remote history of a DVT and pulmonary embolus and was started on Eliquis for this for oral anticoagulation.  She was then diagnosed with paroxysmal atrial fibrillation over the years on device checks, so the anticoagulation was continued for CVA prophylaxis.    She has remained fairly active.  She denies any exertional shortness of breath, chest pain, leg swelling, orthopnea or PND.  She does admit to occasional heart palpitations but not very frequently.  No dizzy spells, syncope or AICD shocks.  She moved to [place] to live closer to her daughter, but this did not work out so she is moving back to the area shortly.    She underwent a biventricular AICD generator exchange in August 2023.  It should be noted that she has a chronically elevated LV pacing threshold.    Past Medical History:   Diagnosis Date    AAA (abdominal aortic aneurysm) (HCC) 09/23/2015    Fusiform AAA in prox & mid

## 2024-04-15 RX ORDER — METOPROLOL TARTRATE 100 MG/1
100 TABLET ORAL 2 TIMES DAILY
Qty: 180 TABLET | Refills: 3 | Status: SHIPPED | OUTPATIENT
Start: 2024-04-15

## 2024-05-06 NOTE — MR AVS SNAPSHOT
Visit Information Date & Time Provider Department Dept. Phone Encounter #  
 2/21/2017  3:40 PM Vivi Chowdary MD [facility] [phone] [id number] Upcoming Health Maintenance Date Due DTaP/Tdap/Td series (1 - Tdap) 12/27/1965 BREAST CANCER SCRN MAMMOGRAM 12/27/1994 FOBT Q 1 YEAR AGE 50-75 12/27/1994 ZOSTER VACCINE AGE 60> 12/27/2004 GLAUCOMA SCREENING Q2Y 12/27/2009 Pneumococcal 65+ Low/Medium Risk (1 of 2 - PCV13) 12/27/2009 MEDICARE YEARLY EXAM 12/27/2009 INFLUENZA AGE 9 TO ADULT 8/1/2016 Allergies as of 2/21/2017  Review Complete On: 2/21/2017 By: Vivi Chowdary MD  
  
 Severity Noted Reaction Type Reactions Lisinopril High 07/23/2010    Rash Hypertensive crisis Tetracycline High 08/08/2012    Anaphylaxis, Rash, Swelling Glucose  07/23/2010    Other (comments) Patient states not allergy Other Medication  07/23/2010    Rash, Swelling  
 -Myacins Current Immunizations  Never Reviewed No immunizations on file. Not reviewed this visit You Were Diagnosed With   
  
 Codes Comments Cardiomyopathy, idiopathic ([facility] [address]    -  Primary ICD-10-CM: I42.8 ICD-9-CM: 425.4 PAF (paroxysmal atrial fibrillation) (HCC)     ICD-10-CM: I48.0 ICD-9-CM: 427.31 Left bundle branch block     ICD-10-CM: I44.7 ICD-9-CM: 426.3 Biventricular implantable cardioverter-defibrillator in situ     ICD-10-CM: Z95.810 ICD-9-CM: V45.02 Vitals BP Pulse Height(growth percentile) Weight(growth percentile) SpO2 BMI  
 110/70 60 5' 5\" (1.651 m) 215 lb (97.5 kg) 96% 35.78 kg/m2 OB Status Smoking Status Postmenopausal Former Smoker Vitals History BMI and BSA Data Body Mass Index Body Surface Area 35.78 kg/m 2 2.11 m 2 Preferred Pharmacy Pharmacy Name Phone [address] [facility] [address] [place] [address] [phone] Your Updated Medication List  
  
 This list is accurate as of: 2/21/17  4:57 PM.  Always use your most recent med list.  
  
  
  
  
 COQ10  100-100 mg-unit Cap Generic drug:  coenzyme q10-vitamin e Take 1 Tab by mouth daily. ELIQUIS 5 mg tablet Generic drug:  apixaban  
two (2) times a day. escitalopram oxalate 20 mg tablet Commonly known as:  Skip  Take 20 mg by mouth daily. exemestane 25 mg tablet Commonly known as:  [place] Take 25 mg by mouth daily. metFORMIN  mg tablet Commonly known as:  GLUCOPHAGE XR Take 500 mg by mouth daily (with dinner). metoprolol tartrate 100 mg IR tablet Commonly known as:  LOPRESSOR  
TAKE ONE TABLET BY MOUTH TWICE DAILY PRAVACHOL 80 mg tablet Generic drug:  pravastatin Take 80 mg by mouth nightly. triamcinolone acetonide 0.1 % topical cream  
Commonly known as:  KENALOG We Performed the Following AMB POC EKG ROUTINE W/ 12 LEADS, INTER & REP [17519 CPT(R)] To-Do List   
 03/06/2017 Lab:  CBC WITH AUTOMATED DIFF   
  
 03/06/2017 Lab:  METABOLIC PANEL, COMPREHENSIVE   
  
 03/06/2017 Lab:  PROTHROMBIN TIME + INR Patient Instructions  [facility] [place] [facility]"" Patient  EP Instructions 1. You are scheduled to have a BIV AICD Gen change on  March 13 ,2017 , at 0915 am.    Please check in at March 13 , 2017. 
 
2. Please go to DR. KIR[facility] PET[facility] and park in the outpatient parking lot that is located around to the back of the hospital and enter through the [employer]. Once you enter through the [facility] check in with the  there. The  will either give you directions or assist you in getting to the cath holding area. 3.         You are not to eat or drink anything after midnight the morning of the  
            procedure.    
 
4. Please continue to take your medications with a small sip of water on Controlled with current regime the morning of the procedure with the following exceptions:  Hold Eliquis (March 11,2017) ( 48 hours prior to procedure) 5. If you are diabetic, do not take your insulin/sugar pill the morning of the procedure. 6. We encourage families to wait in the waiting room on the first floor while the procedure is being done. The Doctor will come out and talk with you as soon as the procedure is over. 7. There is the possibility that you may spend the night in the hospital, depending on the results of the procedure. This will be determined after the procedure is done. 8.   If you or your family have any questions, please call our office Monday-Friday 9:00am  
      -4:30 pm , at [phone], and ask to speak to one of the nurses. Introducing hospitals & [facility] SERVICES! [facility] introduces [employer] patient portal. Now you can access parts of your medical record, email your doctor's office, and request medication refills online. 1. In your internet browser, go to [email or web address] 2. Click on the First Time User? Click Here link in the Sign In box. You will see the New Member Sign Up page. 3. Enter your [employer] Access Code exactly as it appears below. You will not need to use this code after youve completed the sign-up process. If you do not sign up before the expiration date, you must request a new code. · [employer] Access Code: [email or web address] Expires: 4/11/2017  2:27 PM 
 
4. Enter the last four digits of your Social Security Number (xxxx) and Date of Birth (mm/dd/yyyy) as indicated and click Submit. You will be taken to the next sign-up page. 5. Create a [employer] ID. This will be your [employer] login ID and cannot be changed, so think of one that is secure and easy to remember. 6. Create a [employer] password. You can change your password at any time. 7. Enter your Password Reset Question and Answer. This can be used at a later time if you forget your password. 8. Enter your e-mail address. You will receive e-mail notification when new information is available in [address] 9. Click Sign Up. You can now view and download portions of your medical record. 10. Click the Download Summary menu link to download a portable copy of your medical information. If you have questions, please visit the Frequently Asked Questions section of the [employer] website. Remember, [employer] is NOT to be used for urgent needs. For medical emergencies, dial 911. Now available from your iPhone and Android! Please provide this summary of care documentation to your next provider. Your primary care clinician is listed as Franc Restrepo. If you have any questions after today's visit, please call [phone].

## 2024-05-08 ENCOUNTER — HOSPITAL ENCOUNTER (OUTPATIENT)
Facility: HOSPITAL | Age: 79
Setting detail: INFUSION SERIES
End: 2024-05-08

## 2024-05-29 ENCOUNTER — NURSE ONLY (OUTPATIENT)
Age: 79
End: 2024-05-29

## 2024-05-29 DIAGNOSIS — Z95.810 PRESENCE OF AUTOMATIC (IMPLANTABLE) CARDIAC DEFIBRILLATOR: Primary | ICD-10-CM

## 2024-05-29 DIAGNOSIS — I48.0 PAF (PAROXYSMAL ATRIAL FIBRILLATION) (HCC): ICD-10-CM

## 2024-05-29 DIAGNOSIS — I42.9 CARDIOMYOPATHY, IDIOPATHIC (HCC): ICD-10-CM

## 2024-11-21 ENCOUNTER — OFFICE VISIT (OUTPATIENT)
Age: 79
End: 2024-11-21
Payer: MEDICARE

## 2024-11-21 VITALS
DIASTOLIC BLOOD PRESSURE: 76 MMHG | HEIGHT: 67 IN | HEART RATE: 75 BPM | OXYGEN SATURATION: 95 % | WEIGHT: 162 LBS | BODY MASS INDEX: 25.43 KG/M2 | SYSTOLIC BLOOD PRESSURE: 124 MMHG

## 2024-11-21 DIAGNOSIS — Z95.810 PRESENCE OF AUTOMATIC (IMPLANTABLE) CARDIAC DEFIBRILLATOR: Primary | ICD-10-CM

## 2024-11-21 DIAGNOSIS — I48.0 PAF (PAROXYSMAL ATRIAL FIBRILLATION) (HCC): ICD-10-CM

## 2024-11-21 DIAGNOSIS — I42.9 CARDIOMYOPATHY, IDIOPATHIC (HCC): ICD-10-CM

## 2024-11-21 DIAGNOSIS — I10 PRIMARY HYPERTENSION: ICD-10-CM

## 2024-11-21 PROBLEM — F33.9 RECURRENT MAJOR DEPRESSIVE DISORDER (HCC): Status: ACTIVE | Noted: 2024-11-21

## 2024-11-21 PROBLEM — Z86.711 HISTORY OF PULMONARY EMBOLUS (PE): Status: ACTIVE | Noted: 2024-11-21

## 2024-11-21 PROBLEM — Z79.01 LONG TERM CURRENT USE OF ANTICOAGULANT THERAPY: Status: ACTIVE | Noted: 2024-11-21

## 2024-11-21 PROBLEM — Z95.0 PACEMAKER: Status: ACTIVE | Noted: 2024-11-21

## 2024-11-21 PROBLEM — Z79.4 LONG TERM (CURRENT) USE OF INSULIN (HCC): Status: ACTIVE | Noted: 2024-11-21

## 2024-11-21 PROBLEM — Z85.3 PERSONAL HISTORY OF MALIGNANT NEOPLASM OF BREAST: Status: ACTIVE | Noted: 2024-11-21

## 2024-11-21 PROBLEM — E78.5 HYPERLIPIDEMIA: Status: ACTIVE | Noted: 2024-11-21

## 2024-11-21 PROBLEM — E11.9 TYPE 2 DIABETES MELLITUS WITHOUT COMPLICATIONS (HCC): Status: ACTIVE | Noted: 2024-11-21

## 2024-11-21 PROBLEM — E66.9 OBESITY (BMI 30-39.9): Status: ACTIVE | Noted: 2024-11-21

## 2024-11-21 PROCEDURE — G8484 FLU IMMUNIZE NO ADMIN: HCPCS | Performed by: INTERNAL MEDICINE

## 2024-11-21 PROCEDURE — 1126F AMNT PAIN NOTED NONE PRSNT: CPT | Performed by: INTERNAL MEDICINE

## 2024-11-21 PROCEDURE — G8419 CALC BMI OUT NRM PARAM NOF/U: HCPCS | Performed by: INTERNAL MEDICINE

## 2024-11-21 PROCEDURE — 1159F MED LIST DOCD IN RCRD: CPT | Performed by: INTERNAL MEDICINE

## 2024-11-21 PROCEDURE — 3078F DIAST BP <80 MM HG: CPT | Performed by: INTERNAL MEDICINE

## 2024-11-21 PROCEDURE — 1036F TOBACCO NON-USER: CPT | Performed by: INTERNAL MEDICINE

## 2024-11-21 PROCEDURE — G8399 PT W/DXA RESULTS DOCUMENT: HCPCS | Performed by: INTERNAL MEDICINE

## 2024-11-21 PROCEDURE — 1123F ACP DISCUSS/DSCN MKR DOCD: CPT | Performed by: INTERNAL MEDICINE

## 2024-11-21 PROCEDURE — G8427 DOCREV CUR MEDS BY ELIG CLIN: HCPCS | Performed by: INTERNAL MEDICINE

## 2024-11-21 PROCEDURE — 1160F RVW MEDS BY RX/DR IN RCRD: CPT | Performed by: INTERNAL MEDICINE

## 2024-11-21 PROCEDURE — 1090F PRES/ABSN URINE INCON ASSESS: CPT | Performed by: INTERNAL MEDICINE

## 2024-11-21 PROCEDURE — 3074F SYST BP LT 130 MM HG: CPT | Performed by: INTERNAL MEDICINE

## 2024-11-21 ASSESSMENT — ENCOUNTER SYMPTOMS
SHORTNESS OF BREATH: 0
NAUSEA: 0
VOMITING: 0
ABDOMINAL PAIN: 0
SORE THROAT: 0
COUGH: 0
ABDOMINAL DISTENTION: 0

## 2024-11-21 ASSESSMENT — ANXIETY QUESTIONNAIRES
7. FEELING AFRAID AS IF SOMETHING AWFUL MIGHT HAPPEN: NOT AT ALL
2. NOT BEING ABLE TO STOP OR CONTROL WORRYING: NOT AT ALL
4. TROUBLE RELAXING: NOT AT ALL
3. WORRYING TOO MUCH ABOUT DIFFERENT THINGS: NOT AT ALL
5. BEING SO RESTLESS THAT IT IS HARD TO SIT STILL: NOT AT ALL
6. BECOMING EASILY ANNOYED OR IRRITABLE: NOT AT ALL
1. FEELING NERVOUS, ANXIOUS, OR ON EDGE: NOT AT ALL
GAD7 TOTAL SCORE: 0

## 2024-11-21 ASSESSMENT — PATIENT HEALTH QUESTIONNAIRE - PHQ9
2. FEELING DOWN, DEPRESSED OR HOPELESS: NOT AT ALL
SUM OF ALL RESPONSES TO PHQ QUESTIONS 1-9: 0
1. LITTLE INTEREST OR PLEASURE IN DOING THINGS: NOT AT ALL
SUM OF ALL RESPONSES TO PHQ9 QUESTIONS 1 & 2: 0
SUM OF ALL RESPONSES TO PHQ QUESTIONS 1-9: 0

## 2024-11-21 NOTE — PROGRESS NOTES
Mirta Mckeon presents today for   Chief Complaint   Patient presents with    Follow-up     6 month       Mirta CLAUDETTE Mckeon preferred language for health care discussion is english/other.    Is someone accompanying this pt? yes    Is the patient using any DME equipment during OV? no    Depression Screening:  Depression: Not at risk (11/21/2024)    PHQ-2     PHQ-2 Score: 0        Learning Assessment:  Who is the primary learner? Patient    What is the preferred language for health care of the primary learner? ENGLISH    How does the primary learner prefer to learn new concepts? DEMONSTRATION    Answered By patient    Relationship to Learner SELF           Pt currently taking Anticoagulant therapy? Ekiquis 5 mg bid    Pt currently taking Antiplatelet therapy ? no      Coordination of Care:  1. Have you been to the ER, urgent care clinic since your last visit? Hospitalized since your last visit? no    2. Have you seen or consulted any other health care providers outside of the [facility] since your last visit? Include any pap smears or colon screening. no    
likely c/w pacemaker.  Nondiagnostic EKG on pharm stress test due to V-pacing.    CAD (coronary artery disease)     cardiomyopathy,CHF,,Cardiac Cath, pacemaker/defib.    Cancer (HCC) 2013    breast    Cardiomyopathy (HCC)     Chronic lung disease     COPD (chronic obstructive pulmonary disease) (HCC) chronic bronchitis    Coronary artery disease     Coronary atherosclerosis of native coronary artery Feb. 2008    Possible CAD with nonischemic dilated cardiomyopathy/EF 25%/ improved EF up to 56% by echocardiogram.    Diabetes mellitus (HCC)     GERD (gastroesophageal reflux disease)     Heart failure (HCC)     Heart murmur     Hemangioma of liver     History of echocardiogram 07/07/2016    EF 50% (prev 60-65% on study of 4/16/12).  No WMA.  Gr 1 DDfx.  Normal RVSP.      Hypercholesterolemia     Hypertension     Left bundle branch block     Phlebitis     PVD (peripheral vascular disease) ([facility])     Renal calculi 1999    S/P cardiac catheterization 02/27/2008    Patent coronary arteries.  LVEDP 13 mmHg.  EF 25-30%.  Global hypk.    Thromboembolus ([facility])     below knee in R leg     Current Outpatient Medications   Medication Sig Dispense Refill    metoprolol (LOPRESSOR) 100 MG tablet TAKE 1 TABLET BY MOUTH TWICE DAILY 180 tablet 3    ELIQUIS 5 MG TABS tablet Take 1 tablet by mouth 2 times daily 180 tablet 3    pravastatin (PRAVACHOL) 80 MG tablet Take 1 tablet by mouth nightly 90 tablet 3    dilTIAZem (CARDIZEM CD) 240 MG extended release capsule Take 1 capsule by mouth daily 90 capsule 3    escitalopram (LEXAPRO) 20 MG tablet Take 1 tablet by mouth daily       No current facility-administered medications for this visit.     Allergies   Allergen Reactions    Clindamycin Hcl Swelling    Lisinopril Rash     Hypertensive crisis    Other Reaction(s): Unknown    Tetracycline Anaphylaxis, Rash and Swelling     Other Reaction(s): HIVES,SWELLING    Chlorhexidine Gluconate Other (See Comments)     Turned red and BP drop low.

## 2024-12-16 ENCOUNTER — TRANSCRIBE ORDERS (OUTPATIENT)
Facility: HOSPITAL | Age: 79
End: 2024-12-16

## 2024-12-16 DIAGNOSIS — Z78.0 POST-MENOPAUSE: Primary | ICD-10-CM

## 2024-12-24 NOTE — PROGRESS NOTES
After obtaining informed consent, the immunization is given by Mary Lou Elder LPN. High Dose flu given in the right deltoid IM. Flu information sheet given to patient. 1.2

## 2025-02-17 RX ORDER — APIXABAN 5 MG/1
5 TABLET, FILM COATED ORAL 2 TIMES DAILY
Qty: 180 TABLET | Refills: 3 | Status: SHIPPED | OUTPATIENT
Start: 2025-02-17

## 2025-02-26 ENCOUNTER — NURSE ONLY (OUTPATIENT)
Age: 80
End: 2025-02-26
Payer: MEDICARE

## 2025-02-26 DIAGNOSIS — I48.0 PAF (PAROXYSMAL ATRIAL FIBRILLATION) (HCC): ICD-10-CM

## 2025-02-26 DIAGNOSIS — Z95.810 PRESENCE OF AUTOMATIC (IMPLANTABLE) CARDIAC DEFIBRILLATOR: Primary | ICD-10-CM

## 2025-02-26 DIAGNOSIS — I42.9 CARDIOMYOPATHY, IDIOPATHIC (HCC): ICD-10-CM

## 2025-02-26 PROCEDURE — 93295 DEV INTERROG REMOTE 1/2/MLT: CPT | Performed by: INTERNAL MEDICINE

## 2025-02-26 PROCEDURE — 93296 REM INTERROG EVL PM/IDS: CPT | Performed by: INTERNAL MEDICINE

## 2025-03-10 ENCOUNTER — RESULTS FOLLOW-UP (OUTPATIENT)
Age: 80
End: 2025-03-10

## 2025-03-10 NOTE — RESULT ENCOUNTER NOTE
Device check personally reviewed by me.  Normal device function on interrogation.  Episodes of atrial fibrillation noted.  See scanned interrogation document for complete details.

## 2025-04-23 RX ORDER — METOPROLOL TARTRATE 100 MG/1
100 TABLET ORAL 2 TIMES DAILY
Qty: 180 TABLET | Refills: 3 | Status: SHIPPED | OUTPATIENT
Start: 2025-04-23

## 2025-04-23 RX ORDER — PRAVASTATIN SODIUM 80 MG/1
80 TABLET ORAL NIGHTLY
Qty: 90 TABLET | Refills: 3 | Status: SHIPPED | OUTPATIENT
Start: 2025-04-23

## 2025-04-23 RX ORDER — DILTIAZEM HYDROCHLORIDE 240 MG/1
240 CAPSULE, COATED, EXTENDED RELEASE ORAL DAILY
Qty: 90 CAPSULE | Refills: 3 | Status: SHIPPED | OUTPATIENT
Start: 2025-04-23

## 2025-04-23 NOTE — TELEPHONE ENCOUNTER
PCP: Marie Petit MD    Last appt:  11/21/2024   Future Appointments   Date Time Provider Department Center   5/29/2025  1:40 PM Brady Ramsey MD [facility] BS AMB   9/3/2025  2:00 PM CSI, PACER HV [facility] BS AMB       Requested Prescriptions     Pending Prescriptions Disp Refills    dilTIAZem (CARDIZEM CD) 240 MG extended release capsule [Pharmacy Med Name: dilTIAZem HCl ER Coated Beads 240 MG Oral Capsule Extended Release 24 Hour] 80 capsule 0     Sig: Take 1 capsule by mouth once daily    metoprolol (LOPRESSOR) 100 MG tablet [Pharmacy Med Name: Metoprolol Tartrate 100 MG Oral Tablet] 180 tablet 0     Sig: Take 1 tablet by mouth twice daily    pravastatin (PRAVACHOL) 80 MG tablet [Pharmacy Med Name: Pravastatin Sodium 80 MG Oral Tablet] 90 tablet 0     Sig: Take 1 tablet by mouth nightly       Request for a 30 or 90 day supply? Provider Discretion    Pharmacy: Confirmed    Other Comments: Per your last office note: Paroxysmal atrial fibrillation.  3 episodes over the past 6 months.  All asymptomatic, no high rate episodes.  She remains on Eliquis for oral anticoagulation which I would continue long-term.  She is also taking metoprolol and diltiazem for rate control.  I will continue her current medical regimen for this.

## 2025-05-13 ENCOUNTER — TRANSCRIBE ORDERS (OUTPATIENT)
Facility: HOSPITAL | Age: 80
End: 2025-05-13

## 2025-05-13 DIAGNOSIS — F03.90 DEMENTIA, SENILE (HCC): Primary | ICD-10-CM

## 2025-07-30 ENCOUNTER — HOSPITAL ENCOUNTER (OUTPATIENT)
Age: 80
Discharge: HOME | End: 2025-08-02
Payer: MEDICARE

## 2025-07-30 DIAGNOSIS — F03.90 DEMENTIA, SENILE (HCC): ICD-10-CM

## 2025-07-30 PROCEDURE — 70450 CT HEAD/BRAIN W/O DYE: CPT

## 2025-08-30 PROCEDURE — 93296 REM INTERROG EVL PM/IDS: CPT | Performed by: INTERNAL MEDICINE

## 2025-08-30 PROCEDURE — 93295 DEV INTERROG REMOTE 1/2/MLT: CPT | Performed by: INTERNAL MEDICINE

## 2025-09-03 ENCOUNTER — CLINICAL SUPPORT (OUTPATIENT)
Age: 80
End: 2025-09-03
Payer: MEDICARE

## 2025-09-03 DIAGNOSIS — Z95.810 PRESENCE OF AUTOMATIC (IMPLANTABLE) CARDIAC DEFIBRILLATOR: Primary | ICD-10-CM

## 2025-09-03 DIAGNOSIS — I42.9 CARDIOMYOPATHY, IDIOPATHIC (HCC): ICD-10-CM

## (undated) DEVICE — 3M™ IOBAN™ 2 ANTIMICROBIAL INCISE DRAPE 6640EZ: Brand: IOBAN™ 2

## (undated) DEVICE — SUTURE MCRYL SZ 4 0 L18IN ABSRB VLT PS 1 L24MM 3 8 CIR REV Y682H

## (undated) DEVICE — SUTURE ABSORBABLE BRAIDED 2-0 CT-1 27 IN UD VICRYL J259H

## (undated) DEVICE — ELECTRODE PT RET AD L9FT HI MOIST COND ADH HYDRGEL CORDED

## (undated) DEVICE — DRAPE SURG W25XL50IN E OPN CIR BND BG

## (undated) DEVICE — DRESSING HEMSTAT W4XL4IN 4 PLY WHT IMPREG KAOLIN HYDRPHLC

## (undated) DEVICE — KENDALL RADIOLUCENT FOAM MONITORING ELECTRODE RECTANGULAR SHAPE: Brand: KENDALL

## (undated) DEVICE — DRAPE STRL ANGIO W/ 2 FLD COLLCTN PCH 86X135 218X343 CM 2

## (undated) DEVICE — PLASMABLADE PS200-040 4.0: Brand: PLASMABLADE™

## (undated) DEVICE — DRESSING FOAM 4X6 DISP POSTOP MEPILEX BORD AG

## (undated) DEVICE — LIMB HOLDER, WRIST/ANKLE: Brand: DEROYAL

## (undated) DEVICE — Device

## (undated) DEVICE — MEDI-TRACE CADENCE ADULT, DEFIBRILLATION ELECTRODE -RTS  (10 PR/PK) - PHYSIO-CONTROL: Brand: MEDI-TRACE CADENCE